# Patient Record
Sex: MALE | Race: WHITE | NOT HISPANIC OR LATINO | Employment: OTHER | URBAN - METROPOLITAN AREA
[De-identification: names, ages, dates, MRNs, and addresses within clinical notes are randomized per-mention and may not be internally consistent; named-entity substitution may affect disease eponyms.]

---

## 2017-07-24 ENCOUNTER — COMMUNICATION - HEALTHEAST (OUTPATIENT)
Dept: NEUROSURGERY | Facility: CLINIC | Age: 67
End: 2017-07-24

## 2017-08-02 ENCOUNTER — RECORDS - HEALTHEAST (OUTPATIENT)
Dept: ADMINISTRATIVE | Facility: OTHER | Age: 67
End: 2017-08-02

## 2017-10-26 ENCOUNTER — OFFICE VISIT - HEALTHEAST (OUTPATIENT)
Dept: NEUROSURGERY | Facility: CLINIC | Age: 67
End: 2017-10-26

## 2017-10-26 DIAGNOSIS — M79.671 FOOT PAIN, BILATERAL: ICD-10-CM

## 2017-10-26 DIAGNOSIS — M79.672 FOOT PAIN, BILATERAL: ICD-10-CM

## 2017-10-26 DIAGNOSIS — R20.0 NUMBNESS OF FOOT: ICD-10-CM

## 2017-10-26 ASSESSMENT — MIFFLIN-ST. JEOR: SCORE: 2237.26

## 2021-01-12 ENCOUNTER — COMMUNICATION - HEALTHEAST (OUTPATIENT)
Dept: SCHEDULING | Facility: CLINIC | Age: 71
End: 2021-01-12

## 2021-01-18 ENCOUNTER — AMBULATORY - HEALTHEAST (OUTPATIENT)
Dept: INTERVENTIONAL RADIOLOGY/VASCULAR | Facility: HOSPITAL | Age: 71
End: 2021-01-18

## 2021-01-19 ENCOUNTER — COMMUNICATION - HEALTHEAST (OUTPATIENT)
Dept: SCHEDULING | Facility: CLINIC | Age: 71
End: 2021-01-19

## 2021-01-25 ENCOUNTER — AMBULATORY - HEALTHEAST (OUTPATIENT)
Dept: GERIATRICS | Facility: CLINIC | Age: 71
End: 2021-01-25

## 2021-05-31 VITALS — HEIGHT: 74 IN | BODY MASS INDEX: 39.82 KG/M2 | WEIGHT: 310.3 LBS

## 2021-06-13 NOTE — PROGRESS NOTES
NEUROSURGERY CONSULTATION NOTE:    Assessment: Bilateral bottom of the foot numbness, resolved hand numbness on the left.    Plan: I have referred him for a neurological evaluation.  This may include an EMG.  He has had an extensive workup by Dr. Pruitt, with nothing found to date.  I cannot explain his foot numbness by his cervical MRI.    Mesfin Lee   [address]  [place] [address]  67 y.o. male is sent to me in consultation   by Josr Pruitt MD     CC:    Chief Complaint   Patient presents with     Numbness     bilateral feet       HPI:  Neurosurgery consultation was requested by: Dr. Josr Pruitt   Pain: Denies neck pain   Radicular Pain is present: Denies radicular pain   Lhermitte sign: No  Motor complaints: Weakness left hand   Sensory complaints: Numbness and pins and needles in both feet and left hand   Gait and balance issues: Denies   Bowel or bladder issues: Denies   Duration of SX is: 6 months  The symptoms are worse with: Constant   The symptoms are better with: Nothing   Injury: Denies   Severity is: Mild - moderate  Patient has tried the following conservative measures: None   NDI score is : 0%      PROBLEM LIST:  Numbness in the bottom of both feet    REVIEW OF SYSTEMS:  A 12 point review of systems is negative other than the symptoms listed above.      Past Medical History:   Diagnosis Date     Hepatitis C          Past Surgical History:   Procedure Laterality Date     WRIST SURGERY           MEDICATIONS:  No current outpatient prescriptions on file.     No current facility-administered medications for this visit.          ALLERGIES/SENSITIVITIES:     No Known Allergies    PERTINENT SOCIAL HISTORY:   Social History     Social History     Marital status:      Spouse name: N/A     Number of children: N/A     Years of education: N/A     Social History Main Topics     Smoking status: Former Smoker     Smokeless tobacco: None     Alcohol use Yes      Comment: 2/3 days      "Drug use: No     Sexual activity: Not Asked     Other Topics Concern     None     Social History Narrative         FAMILY HISTORY:  Family History   Problem Relation Age of Onset     No Medical Problems Mother      Dementia Father      No Medical Problems Sister      No Medical Problems Brother      No Medical Problems Daughter      No Medical Problems Son      No Medical Problems Maternal Aunt      No Medical Problems Maternal Uncle      No Medical Problems Paternal Aunt      No Medical Problems Paternal Uncle      No Medical Problems Maternal Grandmother      No Medical Problems Maternal Grandfather      No Medical Problems Paternal Grandmother      No Medical Problems Paternal Grandfather         PHYSICAL EXAM:   Constitutional: BP (!) 189/89  Pulse 82  Ht 6' 2\" (1.88 m)  Wt (!) 310 lb 4.8 oz (140.8 kg)  SpO2 96%  BMI 39.84 kg/m2    General appearance: Appropriately groomed.  No acute distress.  Interactive.     Mental Status: Mental status: Alert and oriented, mood and affect appropriate, language reception and expression normal, recent and remote memory is normal, higher cortical function normal. Attention span, concentration and ability to follow commands is normal.       Cranial Nerves: Face is symmetric.  Extraocular movements are full, conjugate and without nystagmus.  Hearing is preserved.  Shoulder position is symmetric.  Tongue is midline with normal motion.       Motor: Motor exam nl bilateral UE. Tone nl, bulk nl and strength 5/5 all groups.      Sensory: Sensory exam by subjective report intact to LT,PP,Position and Vib. in the UE and  LE.     Station and Gait:  Station and Gait- nl stride length,  balance and donavon..     Reflexes; supinator, biceps, triceps, knee/ ankle jerk intact. No hoffmans/babinski/ clonus.    IMAGING:  I have personally reviewed the images and discussed the findings with Mesfin Lee.  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  He " has some mild to moderate stenosis at C4-5 with only mild cord compression.  He has a moderate stenosis at C6-7.  There is no spinal cord pathology.  He has multilevel foraminal stenosis that is mild to moderate at C3-4 and moderate to marked at C6-7.    CC:     Josr Pruitt [id number] [place] [address]

## 2021-06-13 NOTE — PROGRESS NOTES
Neurosurgery consultation was requested by: Dr. Josr Pruitt   Pain: Denies neck pain   Radicular Pain is present: Denies radicular pain   Lhermitte sign: No  Motor complaints: Weakness left hand   Sensory complaints: Numbness and pins and needles in both feet and left hand   Gait and balance issues: Denies   Bowel or bladder issues: Denies   Duration of SX is: 6 months  The symptoms are worse with: Constant   The symptoms are better with: Nothing   Injury: Denies   Severity is: Mild - moderate  Patient has tried the following conservative measures: None   NDI score is : 0%  VIOLETTE Ventura

## 2021-06-16 PROBLEM — E87.20 METABOLIC ACIDOSIS: Status: ACTIVE | Noted: 2021-01-22

## 2021-06-16 PROBLEM — I10 ESSENTIAL HYPERTENSION, BENIGN: Chronic | Status: ACTIVE | Noted: 2021-01-21

## 2021-06-16 PROBLEM — K74.60 CIRRHOSIS OF LIVER WITH ASCITES (H): Status: ACTIVE | Noted: 2020-02-25

## 2021-06-16 PROBLEM — R18.8 CIRRHOSIS OF LIVER WITH ASCITES (H): Status: ACTIVE | Noted: 2020-02-25

## 2021-06-16 PROBLEM — J18.9 HEALTHCARE-ASSOCIATED PNEUMONIA: Chronic | Status: ACTIVE | Noted: 2021-01-11

## 2021-06-16 PROBLEM — I26.99 PE (PULMONARY THROMBOEMBOLISM) (H): Chronic | Status: ACTIVE | Noted: 2021-01-22

## 2021-06-16 PROBLEM — B18.2 HEP C W/O COMA, CHRONIC (H): Status: ACTIVE | Noted: 2017-12-12

## 2021-06-16 PROBLEM — K21.9 GASTROESOPHAGEAL REFLUX DISEASE WITHOUT ESOPHAGITIS: Chronic | Status: ACTIVE | Noted: 2021-01-21

## 2021-06-16 PROBLEM — Z79.01 CHRONIC ANTICOAGULATION: Chronic | Status: ACTIVE | Noted: 2021-01-29

## 2021-07-14 PROBLEM — N17.9 ACUTE KIDNEY INJURY (H): Chronic | Status: RESOLVED | Noted: 2021-01-22 | Resolved: 2021-01-29

## 2021-07-14 PROBLEM — N17.9 ACUTE KIDNEY FAILURE, UNSPECIFIED (H): Status: RESOLVED | Noted: 2021-01-22 | Resolved: 2021-01-29

## 2021-12-31 ENCOUNTER — HOSPITAL ENCOUNTER (INPATIENT)
Facility: CLINIC | Age: 71
LOS: 18 days | Discharge: NURSING, REHAB OR LONG-TERM CARE | DRG: 896 | End: 2022-01-19
Attending: EMERGENCY MEDICINE | Admitting: HOSPITALIST
Payer: COMMERCIAL

## 2021-12-31 ENCOUNTER — APPOINTMENT (OUTPATIENT)
Dept: CT IMAGING | Facility: CLINIC | Age: 71
DRG: 896 | End: 2021-12-31
Attending: EMERGENCY MEDICINE
Payer: COMMERCIAL

## 2021-12-31 DIAGNOSIS — R09.02 HYPOXIA: ICD-10-CM

## 2021-12-31 DIAGNOSIS — E87.1 HYPONATREMIA: ICD-10-CM

## 2021-12-31 DIAGNOSIS — U07.1 INFECTION DUE TO 2019 NOVEL CORONAVIRUS: ICD-10-CM

## 2021-12-31 DIAGNOSIS — E87.6 HYPOKALEMIA: ICD-10-CM

## 2021-12-31 DIAGNOSIS — F10.920 ALCOHOLIC INTOXICATION WITHOUT COMPLICATION (H): ICD-10-CM

## 2021-12-31 DIAGNOSIS — I10 ESSENTIAL HYPERTENSION, BENIGN: Chronic | ICD-10-CM

## 2021-12-31 DIAGNOSIS — R25.1 TREMOR: ICD-10-CM

## 2021-12-31 DIAGNOSIS — I26.99 PE (PULMONARY THROMBOEMBOLISM) (H): Primary | Chronic | ICD-10-CM

## 2021-12-31 LAB
ALBUMIN SERPL-MCNC: 3.6 G/DL (ref 3.5–5)
ALP SERPL-CCNC: 57 U/L (ref 45–120)
ALT SERPL W P-5'-P-CCNC: 26 U/L (ref 0–45)
AMMONIA PLAS-SCNC: 37 UMOL/L (ref 11–35)
ANION GAP SERPL CALCULATED.3IONS-SCNC: 13 MMOL/L (ref 5–18)
AST SERPL W P-5'-P-CCNC: 33 U/L (ref 0–40)
BASOPHILS # BLD AUTO: 0 10E3/UL (ref 0–0.2)
BASOPHILS NFR BLD AUTO: 0 %
BILIRUB SERPL-MCNC: 0.6 MG/DL (ref 0–1)
BUN SERPL-MCNC: 12 MG/DL (ref 8–28)
CALCIUM SERPL-MCNC: 8.3 MG/DL (ref 8.5–10.5)
CHLORIDE BLD-SCNC: 94 MMOL/L (ref 98–107)
CO2 SERPL-SCNC: 21 MMOL/L (ref 22–31)
CREAT SERPL-MCNC: 1.12 MG/DL (ref 0.7–1.3)
EOSINOPHIL # BLD AUTO: 0 10E3/UL (ref 0–0.7)
EOSINOPHIL NFR BLD AUTO: 0 %
ERYTHROCYTE [DISTWIDTH] IN BLOOD BY AUTOMATED COUNT: 13.4 % (ref 10–15)
ETHANOL SERPL-MCNC: 166 MG/DL
GFR SERPL CREATININE-BSD FRML MDRD: 71 ML/MIN/1.73M2
GLUCOSE BLD-MCNC: 100 MG/DL (ref 70–125)
HCT VFR BLD AUTO: 42.9 % (ref 40–53)
HGB BLD-MCNC: 14.9 G/DL (ref 13.3–17.7)
IMM GRANULOCYTES # BLD: 0 10E3/UL
IMM GRANULOCYTES NFR BLD: 0 %
LIPASE SERPL-CCNC: 16 U/L (ref 0–52)
LYMPHOCYTES # BLD AUTO: 3.3 10E3/UL (ref 0.8–5.3)
LYMPHOCYTES NFR BLD AUTO: 40 %
MCH RBC QN AUTO: 33.9 PG (ref 26.5–33)
MCHC RBC AUTO-ENTMCNC: 34.7 G/DL (ref 31.5–36.5)
MCV RBC AUTO: 98 FL (ref 78–100)
MONOCYTES # BLD AUTO: 1 10E3/UL (ref 0–1.3)
MONOCYTES NFR BLD AUTO: 12 %
NEUTROPHILS # BLD AUTO: 4.1 10E3/UL (ref 1.6–8.3)
NEUTROPHILS NFR BLD AUTO: 48 %
NRBC # BLD AUTO: 0 10E3/UL
NRBC BLD AUTO-RTO: 0 /100
PLATELET # BLD AUTO: 138 10E3/UL (ref 150–450)
POTASSIUM BLD-SCNC: 3.1 MMOL/L (ref 3.5–5)
PROT SERPL-MCNC: 7.5 G/DL (ref 6–8)
RBC # BLD AUTO: 4.39 10E6/UL (ref 4.4–5.9)
SODIUM SERPL-SCNC: 128 MMOL/L (ref 136–145)
WBC # BLD AUTO: 8.4 10E3/UL (ref 4–11)

## 2021-12-31 PROCEDURE — 83690 ASSAY OF LIPASE: CPT | Performed by: EMERGENCY MEDICINE

## 2021-12-31 PROCEDURE — 258N000003 HC RX IP 258 OP 636: Performed by: EMERGENCY MEDICINE

## 2021-12-31 PROCEDURE — 70450 CT HEAD/BRAIN W/O DYE: CPT

## 2021-12-31 PROCEDURE — 36415 COLL VENOUS BLD VENIPUNCTURE: CPT | Performed by: EMERGENCY MEDICINE

## 2021-12-31 PROCEDURE — 82077 ASSAY SPEC XCP UR&BREATH IA: CPT | Performed by: EMERGENCY MEDICINE

## 2021-12-31 PROCEDURE — 96360 HYDRATION IV INFUSION INIT: CPT

## 2021-12-31 PROCEDURE — 96361 HYDRATE IV INFUSION ADD-ON: CPT

## 2021-12-31 PROCEDURE — 82140 ASSAY OF AMMONIA: CPT | Performed by: EMERGENCY MEDICINE

## 2021-12-31 PROCEDURE — 93005 ELECTROCARDIOGRAM TRACING: CPT | Performed by: EMERGENCY MEDICINE

## 2021-12-31 PROCEDURE — 80053 COMPREHEN METABOLIC PANEL: CPT | Performed by: EMERGENCY MEDICINE

## 2021-12-31 PROCEDURE — 83735 ASSAY OF MAGNESIUM: CPT | Performed by: HOSPITALIST

## 2021-12-31 PROCEDURE — 85025 COMPLETE CBC W/AUTO DIFF WBC: CPT | Performed by: EMERGENCY MEDICINE

## 2021-12-31 PROCEDURE — 99285 EMERGENCY DEPT VISIT HI MDM: CPT | Mod: 25

## 2021-12-31 RX ORDER — SODIUM CHLORIDE 9 MG/ML
INJECTION, SOLUTION INTRAVENOUS CONTINUOUS
Status: DISCONTINUED | OUTPATIENT
Start: 2021-12-31 | End: 2022-01-01

## 2021-12-31 RX ADMIN — SODIUM CHLORIDE 1000 ML: 9 INJECTION, SOLUTION INTRAVENOUS at 22:14

## 2021-12-31 RX ADMIN — SODIUM CHLORIDE: 9 INJECTION, SOLUTION INTRAVENOUS at 23:08

## 2021-12-31 ASSESSMENT — ENCOUNTER SYMPTOMS: ABDOMINAL PAIN: 0

## 2022-01-01 ENCOUNTER — APPOINTMENT (OUTPATIENT)
Dept: RADIOLOGY | Facility: CLINIC | Age: 72
DRG: 896 | End: 2022-01-01
Attending: EMERGENCY MEDICINE
Payer: COMMERCIAL

## 2022-01-01 PROBLEM — R09.02 HYPOXIA: Status: ACTIVE | Noted: 2022-01-01

## 2022-01-01 PROBLEM — E87.1 HYPONATREMIA: Status: ACTIVE | Noted: 2021-01-22

## 2022-01-01 PROBLEM — U07.1 INFECTION DUE TO 2019 NOVEL CORONAVIRUS: Status: ACTIVE | Noted: 2022-01-01

## 2022-01-01 PROBLEM — E87.6 HYPOKALEMIA: Status: ACTIVE | Noted: 2022-01-01

## 2022-01-01 PROBLEM — F10.920 ALCOHOLIC INTOXICATION WITHOUT COMPLICATION (H): Status: ACTIVE | Noted: 2022-01-01

## 2022-01-01 LAB
ABO/RH(D): NORMAL
ALBUMIN SERPL-MCNC: 3.4 G/DL (ref 3.5–5)
ALP SERPL-CCNC: 52 U/L (ref 45–120)
ALT SERPL W P-5'-P-CCNC: 29 U/L (ref 0–45)
ANION GAP SERPL CALCULATED.3IONS-SCNC: 10 MMOL/L (ref 5–18)
APTT PPP: 32 SECONDS (ref 22–38)
AST SERPL W P-5'-P-CCNC: 37 U/L (ref 0–40)
ATRIAL RATE - MUSE: 94 BPM
BASOPHILS # BLD AUTO: 0 10E3/UL (ref 0–0.2)
BASOPHILS NFR BLD AUTO: 0 %
BILIRUB SERPL-MCNC: 0.6 MG/DL (ref 0–1)
BUN SERPL-MCNC: 9 MG/DL (ref 8–28)
C REACTIVE PROTEIN LHE: 6 MG/DL (ref 0–0.8)
CALCIUM SERPL-MCNC: 8 MG/DL (ref 8.5–10.5)
CHLORIDE BLD-SCNC: 96 MMOL/L (ref 98–107)
CO2 SERPL-SCNC: 25 MMOL/L (ref 22–31)
CREAT SERPL-MCNC: 0.94 MG/DL (ref 0.7–1.3)
D DIMER PPP FEU-MCNC: 0.41 UG/ML FEU (ref 0–0.5)
DIASTOLIC BLOOD PRESSURE - MUSE: 74 MMHG
EOSINOPHIL # BLD AUTO: 0 10E3/UL (ref 0–0.7)
EOSINOPHIL NFR BLD AUTO: 0 %
ERYTHROCYTE [DISTWIDTH] IN BLOOD BY AUTOMATED COUNT: 13.7 % (ref 10–15)
FIBRINOGEN PPP-MCNC: 417 MG/DL (ref 170–490)
GFR SERPL CREATININE-BSD FRML MDRD: 87 ML/MIN/1.73M2
GLUCOSE BLD-MCNC: 97 MG/DL (ref 70–125)
HCT VFR BLD AUTO: 42.4 % (ref 40–53)
HGB BLD-MCNC: 14.5 G/DL (ref 13.3–17.7)
IMM GRANULOCYTES # BLD: 0 10E3/UL
IMM GRANULOCYTES NFR BLD: 0 %
INR PPP: 1.22 (ref 0.85–1.15)
INTERPRETATION ECG - MUSE: NORMAL
LDH SERPL L TO P-CCNC: 140 U/L (ref 125–220)
LYMPHOCYTES # BLD AUTO: 2.2 10E3/UL (ref 0.8–5.3)
LYMPHOCYTES NFR BLD AUTO: 34 %
MAGNESIUM SERPL-MCNC: 1.8 MG/DL (ref 1.8–2.6)
MCH RBC QN AUTO: 33.8 PG (ref 26.5–33)
MCHC RBC AUTO-ENTMCNC: 34.2 G/DL (ref 31.5–36.5)
MCV RBC AUTO: 99 FL (ref 78–100)
MONOCYTES # BLD AUTO: 0.8 10E3/UL (ref 0–1.3)
MONOCYTES NFR BLD AUTO: 12 %
NEUTROPHILS # BLD AUTO: 3.4 10E3/UL (ref 1.6–8.3)
NEUTROPHILS NFR BLD AUTO: 54 %
NRBC # BLD AUTO: 0 10E3/UL
NRBC BLD AUTO-RTO: 0 /100
P AXIS - MUSE: 71 DEGREES
PHOSPHATE SERPL-MCNC: 3.1 MG/DL (ref 2.5–4.5)
PLATELET # BLD AUTO: 121 10E3/UL (ref 150–450)
POTASSIUM BLD-SCNC: 3.3 MMOL/L (ref 3.5–5)
POTASSIUM BLD-SCNC: 3.3 MMOL/L (ref 3.5–5)
POTASSIUM BLD-SCNC: 3.8 MMOL/L (ref 3.5–5)
PR INTERVAL - MUSE: 186 MS
PROT SERPL-MCNC: 7.2 G/DL (ref 6–8)
QRS DURATION - MUSE: 168 MS
QT - MUSE: 434 MS
QTC - MUSE: 542 MS
R AXIS - MUSE: -4 DEGREES
RBC # BLD AUTO: 4.29 10E6/UL (ref 4.4–5.9)
SARS-COV-2 RNA RESP QL NAA+PROBE: POSITIVE
SODIUM SERPL-SCNC: 131 MMOL/L (ref 136–145)
SPECIMEN EXPIRATION DATE: NORMAL
SYSTOLIC BLOOD PRESSURE - MUSE: 131 MMHG
T AXIS - MUSE: 40 DEGREES
TROPONIN I SERPL-MCNC: 0.04 NG/ML (ref 0–0.29)
VENTRICULAR RATE- MUSE: 94 BPM
WBC # BLD AUTO: 6.4 10E3/UL (ref 4–11)

## 2022-01-01 PROCEDURE — 250N000009 HC RX 250: Performed by: HOSPITALIST

## 2022-01-01 PROCEDURE — 250N000011 HC RX IP 250 OP 636: Performed by: HOSPITALIST

## 2022-01-01 PROCEDURE — 83615 LACTATE (LD) (LDH) ENZYME: CPT | Performed by: HOSPITALIST

## 2022-01-01 PROCEDURE — 86901 BLOOD TYPING SEROLOGIC RH(D): CPT | Performed by: HOSPITALIST

## 2022-01-01 PROCEDURE — 99207 PR NO BILLABLE SERVICE THIS VISIT: CPT | Performed by: INTERNAL MEDICINE

## 2022-01-01 PROCEDURE — 94640 AIRWAY INHALATION TREATMENT: CPT

## 2022-01-01 PROCEDURE — 85730 THROMBOPLASTIN TIME PARTIAL: CPT | Performed by: HOSPITALIST

## 2022-01-01 PROCEDURE — 80053 COMPREHEN METABOLIC PANEL: CPT | Performed by: HOSPITALIST

## 2022-01-01 PROCEDURE — 85384 FIBRINOGEN ACTIVITY: CPT | Performed by: HOSPITALIST

## 2022-01-01 PROCEDURE — 87635 SARS-COV-2 COVID-19 AMP PRB: CPT | Performed by: EMERGENCY MEDICINE

## 2022-01-01 PROCEDURE — 85379 FIBRIN DEGRADATION QUANT: CPT | Performed by: HOSPITALIST

## 2022-01-01 PROCEDURE — 84100 ASSAY OF PHOSPHORUS: CPT | Performed by: HOSPITALIST

## 2022-01-01 PROCEDURE — 71046 X-RAY EXAM CHEST 2 VIEWS: CPT

## 2022-01-01 PROCEDURE — 120N000001 HC R&B MED SURG/OB

## 2022-01-01 PROCEDURE — 250N000013 HC RX MED GY IP 250 OP 250 PS 637: Performed by: EMERGENCY MEDICINE

## 2022-01-01 PROCEDURE — 84132 ASSAY OF SERUM POTASSIUM: CPT | Performed by: HOSPITALIST

## 2022-01-01 PROCEDURE — 258N000003 HC RX IP 258 OP 636: Performed by: HOSPITALIST

## 2022-01-01 PROCEDURE — 85610 PROTHROMBIN TIME: CPT | Performed by: HOSPITALIST

## 2022-01-01 PROCEDURE — 86141 C-REACTIVE PROTEIN HS: CPT | Performed by: HOSPITALIST

## 2022-01-01 PROCEDURE — 250N000009 HC RX 250: Performed by: EMERGENCY MEDICINE

## 2022-01-01 PROCEDURE — 99207 PR NO CHARGE LOS: CPT | Performed by: FAMILY MEDICINE

## 2022-01-01 PROCEDURE — 250N000013 HC RX MED GY IP 250 OP 250 PS 637: Performed by: INTERNAL MEDICINE

## 2022-01-01 PROCEDURE — 84484 ASSAY OF TROPONIN QUANT: CPT | Performed by: HOSPITALIST

## 2022-01-01 PROCEDURE — 99223 1ST HOSP IP/OBS HIGH 75: CPT | Performed by: HOSPITALIST

## 2022-01-01 PROCEDURE — 36415 COLL VENOUS BLD VENIPUNCTURE: CPT | Performed by: HOSPITALIST

## 2022-01-01 PROCEDURE — 85025 COMPLETE CBC W/AUTO DIFF WBC: CPT | Performed by: HOSPITALIST

## 2022-01-01 PROCEDURE — 250N000013 HC RX MED GY IP 250 OP 250 PS 637: Performed by: HOSPITALIST

## 2022-01-01 PROCEDURE — C9803 HOPD COVID-19 SPEC COLLECT: HCPCS

## 2022-01-01 RX ORDER — LORAZEPAM 2 MG/ML
1-2 INJECTION INTRAMUSCULAR EVERY 30 MIN PRN
Status: DISCONTINUED | OUTPATIENT
Start: 2022-01-01 | End: 2022-01-03

## 2022-01-01 RX ORDER — POTASSIUM CHLORIDE 1500 MG/1
40 TABLET, EXTENDED RELEASE ORAL ONCE
Status: COMPLETED | OUTPATIENT
Start: 2022-01-01 | End: 2022-01-01

## 2022-01-01 RX ORDER — FOLIC ACID 1 MG/1
1 TABLET ORAL DAILY
Status: DISCONTINUED | OUTPATIENT
Start: 2022-01-02 | End: 2022-01-19 | Stop reason: HOSPADM

## 2022-01-01 RX ORDER — DIAZEPAM 5 MG
5 TABLET ORAL 3 TIMES DAILY
Status: DISCONTINUED | OUTPATIENT
Start: 2022-01-01 | End: 2022-01-09

## 2022-01-01 RX ORDER — ACETAMINOPHEN 650 MG/1
650 SUPPOSITORY RECTAL EVERY 6 HOURS PRN
Status: DISCONTINUED | OUTPATIENT
Start: 2022-01-01 | End: 2022-01-19 | Stop reason: HOSPADM

## 2022-01-01 RX ORDER — GABAPENTIN 100 MG/1
200 CAPSULE ORAL 3 TIMES DAILY
Status: DISCONTINUED | OUTPATIENT
Start: 2022-01-01 | End: 2022-01-07

## 2022-01-01 RX ORDER — PROPRANOLOL HCL 60 MG
60 CAPSULE, EXTENDED RELEASE 24HR ORAL DAILY
Status: ON HOLD | COMMUNITY
Start: 2021-07-31 | End: 2022-01-01

## 2022-01-01 RX ORDER — METOPROLOL TARTRATE 50 MG
50 TABLET ORAL 2 TIMES DAILY
Status: DISCONTINUED | OUTPATIENT
Start: 2022-01-01 | End: 2022-01-02

## 2022-01-01 RX ORDER — ONDANSETRON 2 MG/ML
4 INJECTION INTRAMUSCULAR; INTRAVENOUS EVERY 6 HOURS PRN
Status: DISCONTINUED | OUTPATIENT
Start: 2022-01-01 | End: 2022-01-19 | Stop reason: HOSPADM

## 2022-01-01 RX ORDER — CLONIDINE HYDROCHLORIDE 0.1 MG/1
0.1 TABLET ORAL EVERY 8 HOURS
Status: DISCONTINUED | OUTPATIENT
Start: 2022-01-01 | End: 2022-01-04

## 2022-01-01 RX ORDER — LIDOCAINE 40 MG/G
CREAM TOPICAL
Status: DISCONTINUED | OUTPATIENT
Start: 2022-01-01 | End: 2022-01-19 | Stop reason: HOSPADM

## 2022-01-01 RX ORDER — DEXAMETHASONE SODIUM PHOSPHATE 10 MG/ML
6 INJECTION, SOLUTION INTRAMUSCULAR; INTRAVENOUS DAILY
Status: COMPLETED | OUTPATIENT
Start: 2022-01-01 | End: 2022-01-10

## 2022-01-01 RX ORDER — LISINOPRIL 10 MG/1
10 TABLET ORAL DAILY
Status: DISCONTINUED | OUTPATIENT
Start: 2022-01-01 | End: 2022-01-01

## 2022-01-01 RX ORDER — GABAPENTIN 100 MG/1
200 CAPSULE ORAL 2 TIMES DAILY
Status: DISCONTINUED | OUTPATIENT
Start: 2022-01-01 | End: 2022-01-01

## 2022-01-01 RX ORDER — LORAZEPAM 1 MG/1
1-2 TABLET ORAL EVERY 30 MIN PRN
Status: DISCONTINUED | OUTPATIENT
Start: 2022-01-01 | End: 2022-01-03

## 2022-01-01 RX ORDER — MULTIPLE VITAMINS W/ MINERALS TAB 9MG-400MCG
1 TAB ORAL DAILY
Status: DISCONTINUED | OUTPATIENT
Start: 2022-01-02 | End: 2022-01-03

## 2022-01-01 RX ORDER — ONDANSETRON 4 MG/1
4 TABLET, ORALLY DISINTEGRATING ORAL EVERY 6 HOURS PRN
Status: DISCONTINUED | OUTPATIENT
Start: 2022-01-01 | End: 2022-01-19 | Stop reason: HOSPADM

## 2022-01-01 RX ORDER — DIAZEPAM 5 MG
5 TABLET ORAL ONCE
Status: COMPLETED | OUTPATIENT
Start: 2022-01-01 | End: 2022-01-01

## 2022-01-01 RX ORDER — FLUMAZENIL 0.1 MG/ML
0.2 INJECTION, SOLUTION INTRAVENOUS
Status: DISCONTINUED | OUTPATIENT
Start: 2022-01-01 | End: 2022-01-03

## 2022-01-01 RX ORDER — ACETAMINOPHEN 325 MG/1
650 TABLET ORAL EVERY 6 HOURS PRN
Status: DISCONTINUED | OUTPATIENT
Start: 2022-01-01 | End: 2022-01-19 | Stop reason: HOSPADM

## 2022-01-01 RX ORDER — IPRATROPIUM BROMIDE AND ALBUTEROL SULFATE 2.5; .5 MG/3ML; MG/3ML
3 SOLUTION RESPIRATORY (INHALATION) ONCE
Status: COMPLETED | OUTPATIENT
Start: 2022-01-01 | End: 2022-01-01

## 2022-01-01 RX ORDER — POLYETHYLENE GLYCOL 3350 17 G/17G
17 POWDER, FOR SOLUTION ORAL DAILY
Status: DISCONTINUED | OUTPATIENT
Start: 2022-01-01 | End: 2022-01-07

## 2022-01-01 RX ADMIN — CLONIDINE HYDROCHLORIDE 0.1 MG: 0.1 TABLET ORAL at 04:31

## 2022-01-01 RX ADMIN — POLYETHYLENE GLYCOL 3350 17 G: 17 POWDER, FOR SOLUTION ORAL at 11:11

## 2022-01-01 RX ADMIN — IPRATROPIUM BROMIDE AND ALBUTEROL SULFATE 3 ML: .5; 2.5 SOLUTION RESPIRATORY (INHALATION) at 00:10

## 2022-01-01 RX ADMIN — DEXAMETHASONE SODIUM PHOSPHATE 6 MG: 10 INJECTION, SOLUTION INTRAMUSCULAR; INTRAVENOUS at 04:31

## 2022-01-01 RX ADMIN — LORAZEPAM 1 MG: 1 TABLET ORAL at 18:55

## 2022-01-01 RX ADMIN — ACETAMINOPHEN 650 MG: 325 TABLET ORAL at 16:04

## 2022-01-01 RX ADMIN — DIAZEPAM 5 MG: 5 TABLET ORAL at 13:46

## 2022-01-01 RX ADMIN — CLONIDINE HYDROCHLORIDE 0.1 MG: 0.1 TABLET ORAL at 19:00

## 2022-01-01 RX ADMIN — Medication 5 MG: at 04:43

## 2022-01-01 RX ADMIN — GABAPENTIN 200 MG: 100 CAPSULE ORAL at 20:01

## 2022-01-01 RX ADMIN — DIAZEPAM 5 MG: 5 TABLET ORAL at 11:11

## 2022-01-01 RX ADMIN — CLONIDINE HYDROCHLORIDE 0.1 MG: 0.1 TABLET ORAL at 11:11

## 2022-01-01 RX ADMIN — DIAZEPAM 5 MG: 5 TABLET ORAL at 20:01

## 2022-01-01 RX ADMIN — DIAZEPAM 5 MG: 5 TABLET ORAL at 02:39

## 2022-01-01 RX ADMIN — THIAMINE HYDROCHLORIDE: 100 INJECTION, SOLUTION INTRAMUSCULAR; INTRAVENOUS at 04:31

## 2022-01-01 RX ADMIN — POTASSIUM CHLORIDE 40 MEQ: 1500 TABLET, EXTENDED RELEASE ORAL at 04:44

## 2022-01-01 RX ADMIN — METOPROLOL TARTRATE 50 MG: 50 TABLET, FILM COATED ORAL at 11:11

## 2022-01-01 RX ADMIN — GABAPENTIN 200 MG: 100 CAPSULE ORAL at 13:46

## 2022-01-01 ASSESSMENT — ACTIVITIES OF DAILY LIVING (ADL)
ADLS_ACUITY_SCORE: 14
HEARING_DIFFICULTY_OR_DEAF: NO
ADLS_ACUITY_SCORE: 16
DIFFICULTY_EATING/SWALLOWING: NO
ADLS_ACUITY_SCORE: 14
ADLS_ACUITY_SCORE: 16
ADLS_ACUITY_SCORE: 14
ADLS_ACUITY_SCORE: 14
ADLS_ACUITY_SCORE: 16
ADLS_ACUITY_SCORE: 10
DRESSING/BATHING_DIFFICULTY: NO
DOING_ERRANDS_INDEPENDENTLY_DIFFICULTY: NO
ADLS_ACUITY_SCORE: 14
ADLS_ACUITY_SCORE: 14
WEAR_GLASSES_OR_BLIND: NO
ADLS_ACUITY_SCORE: 16
WALKING_OR_CLIMBING_STAIRS_DIFFICULTY: NO
ADLS_ACUITY_SCORE: 6
ADLS_ACUITY_SCORE: 14
ADLS_ACUITY_SCORE: 14
FALL_HISTORY_WITHIN_LAST_SIX_MONTHS: NO
TOILETING_ISSUES: OTHER (SEE COMMENTS)
CONCENTRATING,_REMEMBERING_OR_MAKING_DECISIONS_DIFFICULTY: NO
ADLS_ACUITY_SCORE: 16
ADLS_ACUITY_SCORE: 7
DIFFICULTY_COMMUNICATING: NO
ADLS_ACUITY_SCORE: 6

## 2022-01-01 NOTE — PROGRESS NOTES
Care Management Follow Up    Length of Stay (days): 0    Expected Discharge Date: 01/03/2022     Concerns to be Addressed:       Patient plan of care discussed at interdisciplinary rounds: No    Anticipated Discharge Disposition: Home       Anticipated Discharge Services:    Anticipated Discharge DME:      Patient/family educated on Medicare website which has current facility and service quality ratings:    Education Provided on the Discharge Plan:    Patient/Family in Agreement with the Plan:      Referrals Placed by CM/SW:    Private pay costs discussed:     Additional Information:  Chart review.  Called patient in room COVID+. Patient lives alone in a 2 level house.  He still drives.  He drinks 3-4 times a week.  Having 4-5 drinks at a restaurant. He stated he has never been to treatment.  He had a DWI last July in [place].  He does think he needs to stop drinking. He would be agreeable to CD resources closer to discharge.  CM to continue to follow.      CARLINE Flores

## 2022-01-01 NOTE — ED NOTES
Pt stood to use urinal with minimal assistance. Pt then stated that he felt dizzy and shaky. Pt laid back down. MD updated.

## 2022-01-01 NOTE — ED NOTES
Bed: [facility]-12  Expected date: 12/31/21  Expected time: 8:11 PM  Means of arrival: Ambulance  Comments:  [place]

## 2022-01-01 NOTE — ED PROVIDER NOTES
"EMERGENCY DEPARTMENT ENCOUNTER      NAME: Mesfin Lee  AGE: 71 year old male  YOB: 1950  MRN: [id number]  EVALUATION DATE & TIME: 2021  8:16 PM    PCP: Josr Pruitt    ED PROVIDER: Dashawn Horton M.D.      No chief complaint on file.    Cannot stand or walk.    FINAL IMPRESSION:  1.  Acute alcohol intoxication.  2.  Chronic alcoholism.      ED COURSE & MEDICAL DECISION MAKIN:18 PM I met with the patient to gather history and to perform my initial exam. We discussed plans for the ED course, including diagnostic testing and treatment. PPE worn: cloth mask.  [facility]  After drinking there he was not able to stand or walk on his own.  He noted no difficulty walking into the place beforehand.  Patient states \"I drank too much.  \"Chart indicates history of alcoholism, hepatitis C which is chronic, cirrhosis with ascites and hepatic coma in the past.  9:35 PM.  CBC unremarkable and platelets at 138.  Lipase, LFTs, chemistry and blood alcohol levels pending.  Head CT also pending.  Tentatively, patient will be signed out to the night ED physician at around 10 PM.  Tentative discharge if patient has a ride.    Pertinent Labs & Imaging studies reviewed. (See chart for details)  71 year old male presents to the Emergency Department for evaluation of ambulation issues.     At the conclusion of the encounter I discussed the results of all of the tests and the disposition. The questions were answered. The patient or family acknowledged understanding and was agreeable with the care plan.        MEDICATIONS GIVEN IN THE EMERGENCY:  Medications - No data to display    NEW PRESCRIPTIONS STARTED AT TODAY'S ER VISIT  New Prescriptions    No medications on file          =================================================================    HPI    Patient information was obtained from: Patient     Use of : N/A      Mesfin Lee is a 71 year old male with " "a pertinent history of Hepatitis C, GERD, HTN, RBBB, PE, and SBO who presents to this ED via EMS for evaluation of ambulatory issues.     The patient reports having \"a few too many\" drinks at the bar tonight. He states that he had 4-5 drinks and suddenly was unable to walk. The patient states that he is \"not really\" an alcoholic and wants to go home. He is otherwise in his usual state of health and denies any abdominal pain or any other concerns at this time.     He does not identify any waxing or waning symptoms otherwise, exacerbating or alleviating features,associated symptoms except as mentioned. He denies any pain related complaints.    REVIEW OF SYSTEMS   Review of Systems   Constitutional:        Positive for being unable to walk.    Gastrointestinal: Negative for abdominal pain.   All other systems reviewed and are negative.       PAST MEDICAL HISTORY:  Past Medical History:   Diagnosis Date     Hepatitis C        PAST SURGICAL HISTORY:  Past Surgical History:   Procedure Laterality Date     US GUIDED NEEDLE PLACEMENT  1/18/2021     WRIST SURGERY             CURRENT MEDICATIONS:    acetaminophen (TYLENOL) 325 MG tablet  apixaban ANTICOAGULANT (ELIQUIS) 5 mg Tab tablet  baclofen (LIORESAL) 10 MG tablet  bisacodyL (DULCOLAX) 10 mg suppository  diclofenac sodium (VOLTAREN) 1 % Gel  escitalopram oxalate (LEXAPRO) 10 MG tablet  gabapentin (NEURONTIN) 100 MG capsule  lidocaine 4 % patch  lisinopriL (PRINIVIL,ZESTRIL) 10 MG tablet  melatonin 5 mg Tab tablet  omeprazole (PRILOSEC) 40 MG capsule  oxyCODONE (ROXICODONE) 15 MG immediate release tablet  polyethylene glycol (MIRALAX) 17 gram packet  polyethylene glycol (MIRALAX) 17 gram packet  saliva stimulant (BIOTENE) oral spray  senna-docusate (PERICOLACE) 8.6-50 mg tablet  traZODone (DESYREL) 100 MG tablet        ALLERGIES:  No Known Allergies    FAMILY HISTORY:  Family History   Problem Relation Age of Onset     No Known Problems Mother      Dementia Father      No " Known Problems Sister      No Known Problems Brother      No Known Problems Daughter      No Known Problems Son      No Known Problems Maternal Aunt      No Known Problems Maternal Uncle      No Known Problems Paternal Aunt      No Known Problems Paternal Uncle      No Known Problems Maternal Grandmother      No Known Problems Maternal Grandfather      No Known Problems Paternal Grandmother      No Known Problems Paternal Grandfather        SOCIAL HISTORY:   Social History     Socioeconomic History     Marital status:      Spouse name: Not on file     Number of children: Not on file     Years of education: Not on file     Highest education level: Not on file   Occupational History     Not on file   Tobacco Use     Smoking status: Former Smoker     Smokeless tobacco: Not on file   Substance and Sexual Activity     Alcohol use: Yes     Comment: Alcoholic Drinks/day: 2/3 days     Drug use: No     Sexual activity: Not on file   Other Topics Concern     Not on file   Social History Narrative     Not on file     Social Determinants of Health     Financial Resource Strain: Not on file   Food Insecurity: Not on file   Transportation Needs: Not on file   Physical Activity: Not on file   Stress: Not on file   Social Connections: Not on file   Intimate Partner Violence: Not on file   Housing Stability: Not on file   Chart indicates former smoker.  No current drugs or tobacco.  Patient is a practicing alcoholic.    VITALS:  /74   Pulse 94   Temp 98.9  F (37.2  C) (Oral)   Resp 22   SpO2 93%     PHYSICAL EXAM    Vital Signs:  /74   Pulse 94   Temp 98.9  F (37.2  C) (Oral)   Resp 22   SpO2 93%   General:  On entering the room is in no apparent distress.  Sounds intoxicated.  However, answers questions appropriately.  Neck:  Neck supple with full range of motion and nontender.    Back:  Back and spine are nontender.  No costovertebral angle tenderness.    HEENT:  Oropharynx clear with moist mucous  membranes.  HEENT unremarkable.    Pulmonary:  Chest clear to auscultation without rhonchi rales or wheezing.    Cardiovascular:  Cardiac regular rate and rhythm without murmurs rubs or gallops.    Abdomen:  Abdomen soft nontender.  There is no rebound or guarding.    Muskuloskeletal:  he moves all 4 without any difficulty and has normal neurovascular exams.  Extremities without clubbing, cyanosis, or edema.  Legs and calves are nontender.    Neuro:  he is alert and oriented ×3 and moves all extremities symmetrically.    Psych:  Normal affect.    Skin:  Unremarkable and warm and dry.       LAB:  All pertinent labs reviewed and interpreted.  Labs Ordered and Resulted from Time of ED Arrival to Time of ED Departure   AMMONIA - Abnormal       Result Value    Ammonia 37 (*)    CBC WITH PLATELETS AND DIFFERENTIAL - Abnormal    WBC Count 8.4      RBC Count 4.39 (*)     Hemoglobin 14.9      Hematocrit 42.9      MCV 98      MCH 33.9 (*)     MCHC 34.7      RDW 13.4      Platelet Count 138 (*)     % Neutrophils 48      % Lymphocytes 40      % Monocytes 12      % Eosinophils 0      % Basophils 0      % Immature Granulocytes 0      NRBCs per 100 WBC 0      Absolute Neutrophils 4.1      Absolute Lymphocytes 3.3      Absolute Monocytes 1.0      Absolute Eosinophils 0.0      Absolute Basophils 0.0      Absolute Immature Granulocytes 0.0      Absolute NRBCs 0.0     ETHYL ALCOHOL LEVEL   COMPREHENSIVE METABOLIC PANEL   LIPASE       RADIOLOGY:  Reviewed all pertinent imaging. Please see official radiology report.  Head CT w/o contrast    (Results Pending)              EKG:    Normal sinus rhythm at 94, right bundle branch block.  Similar to previous EKG of January 26, 2021 but the QT corrected phase is longer than 0.542 from previous 0.475.    I have independently reviewed and interpreted the EKG(s) documented above.      [place], am serving as a scribe to document services personally performed by Dr. Horton based on my  observation and the provider's statements to me. I, Dashawn Horton MD attest that Lamontjannet Villarenan is acting in a scribe capacity, has observed my performance of the services and has documented them in accordance with my direction.    Dashawn Horton M.D.  Emergency Medicine  [facility] EMERGENCY ROOM  [address] [address]  [phone]  Dept: [phone]     Dashawn Horton MD  12/31/21 5537

## 2022-01-01 NOTE — ED PROVIDER NOTES
eMERGENCY dEPARTMENT PROGRESS NOTE        FINAL IMPRESSION    1. Alcoholic intoxication without complication (H)    2. Hyponatremia    3. Infection due to 2019 novel coronavirus    4. Hypoxia    5. Hypokalemia         ED COURSE AND MEDICAL DECISION MAKING  Patient was signed out to me by Dr. Horton at 10:07 PM    2:28 AM Updated patient on results and plan for admission.  2:41 AM I discussed the case with hospitalist, Dr. Willis, who accepts the patient for admission.      Mesfin Lee is a 71 year old male who presented to the ED for evaluation of alcohol intoxication. Patient was drinking at a bar tonight and after 4-5 drinks he was unable to walk. At time of sign out pending labs, CT, and disposition.    Patient's labs show a hyponatremia.  Also intoxicated.  Patient subsequently developed some hypoxia here.  Chest x-ray is negative.  He does have a positive Covid test.  I think this in addition to his difficulty getting around I do think patient needs to be admitted.  Discussed with the hospitalist.  Given some Valium here for anxiety and possible early alcohol withdrawal    At the conclusion of the encounter I discussed the results of all of the tests and the disposition. The questions were answered. The patient or family acknowledged understanding and was agreeable with the care plan.     DISCHARGE PRESCRIPTIONS  Current Discharge Medication List          LAB  Pertinent labs results reviewed   Results for orders placed or performed during the hospital encounter of 12/31/21   Head CT w/o contrast    Impression    IMPRESSION:  1.  No CT evidence for acute intracranial process.  2.  Brain atrophy and presumed chronic microvascular ischemic changes as above.  3.  No change.   Chest XR,  PA & LAT    Impression    IMPRESSION: Stable cardiomediastinal silhouette. No focal consolidation or pleural effusion. Degenerative change osseous structures. Atherosclerotic aorta. Chronic compression deformities lower thoracic  spine.   Ammonia (on ice)   Result Value Ref Range    Ammonia 37 (H) 11 - 35 umol/L   Alcohol level blood   Result Value Ref Range    Alcohol, Blood 166 (H) None detected mg/dL   Comprehensive metabolic panel   Result Value Ref Range    Sodium 128 (L) 136 - 145 mmol/L    Potassium 3.1 (L) 3.5 - 5.0 mmol/L    Chloride 94 (L) 98 - 107 mmol/L    Carbon Dioxide (CO2) 21 (L) 22 - 31 mmol/L    Anion Gap 13 5 - 18 mmol/L    Urea Nitrogen 12 8 - 28 mg/dL    Creatinine 1.12 0.70 - 1.30 mg/dL    Calcium 8.3 (L) 8.5 - 10.5 mg/dL    Glucose 100 70 - 125 mg/dL    Alkaline Phosphatase 57 45 - 120 U/L    AST 33 0 - 40 U/L    ALT 26 0 - 45 U/L    Protein Total 7.5 6.0 - 8.0 g/dL    Albumin 3.6 3.5 - 5.0 g/dL    Bilirubin Total 0.6 0.0 - 1.0 mg/dL    GFR Estimate 71 >60 mL/min/1.73m2   Result Value Ref Range    Lipase 16 0 - 52 U/L   CBC with platelets and differential   Result Value Ref Range    WBC Count 8.4 4.0 - 11.0 10e3/uL    RBC Count 4.39 (L) 4.40 - 5.90 10e6/uL    Hemoglobin 14.9 13.3 - 17.7 g/dL    Hematocrit 42.9 40.0 - 53.0 %    MCV 98 78 - 100 fL    MCH 33.9 (H) 26.5 - 33.0 pg    MCHC 34.7 31.5 - 36.5 g/dL    RDW 13.4 10.0 - 15.0 %    Platelet Count 138 (L) 150 - 450 10e3/uL    % Neutrophils 48 %    % Lymphocytes 40 %    % Monocytes 12 %    % Eosinophils 0 %    % Basophils 0 %    % Immature Granulocytes 0 %    NRBCs per 100 WBC 0 <1 /100    Absolute Neutrophils 4.1 1.6 - 8.3 10e3/uL    Absolute Lymphocytes 3.3 0.8 - 5.3 10e3/uL    Absolute Monocytes 1.0 0.0 - 1.3 10e3/uL    Absolute Eosinophils 0.0 0.0 - 0.7 10e3/uL    Absolute Basophils 0.0 0.0 - 0.2 10e3/uL    Absolute Immature Granulocytes 0.0 <=0.4 10e3/uL    Absolute NRBCs 0.0 10e3/uL   Asymptomatic COVID-19 Virus (Coronavirus) by PCR Nasopharyngeal    Specimen: Nasopharyngeal; Swab   Result Value Ref Range    SARS CoV2 PCR Positive (A) Negative   Result Value Ref Range    Magnesium 1.8 1.8 - 2.6 mg/dL   Comprehensive metabolic panel   Result Value Ref Range     Sodium 131 (L) 136 - 145 mmol/L    Potassium 3.3 (L) 3.5 - 5.0 mmol/L    Chloride 96 (L) 98 - 107 mmol/L    Carbon Dioxide (CO2) 25 22 - 31 mmol/L    Anion Gap 10 5 - 18 mmol/L    Urea Nitrogen 9 8 - 28 mg/dL    Creatinine 0.94 0.70 - 1.30 mg/dL    Calcium 8.0 (L) 8.5 - 10.5 mg/dL    Glucose 97 70 - 125 mg/dL    Alkaline Phosphatase 52 45 - 120 U/L    AST 37 0 - 40 U/L    ALT 29 0 - 45 U/L    Protein Total 7.2 6.0 - 8.0 g/dL    Albumin 3.4 (L) 3.5 - 5.0 g/dL    Bilirubin Total 0.6 0.0 - 1.0 mg/dL    GFR Estimate 87 >60 mL/min/1.73m2   Result Value Ref Range    Lactate Dehydrogenase 140 125 - 220 U/L   Result Value Ref Range    Troponin I 0.04 0.00 - 0.29 ng/mL   Result Value Ref Range    INR 1.22 (H) 0.85 - 1.15   Partial thromboplastin time   Result Value Ref Range    aPTT 32 22 - 38 Seconds   Fibrinogen activity   Result Value Ref Range    Fibrinogen Activity 417 170 - 490 mg/dL   D dimer quantitative   Result Value Ref Range    D-Dimer Quantitative 0.41 0.00 - 0.50 ug/mL FEU   CRP inflammation   Result Value Ref Range    CRP 6.0 (H) 0.0-<0.8 mg/dL   Result Value Ref Range    Phosphorus 3.1 2.5 - 4.5 mg/dL   CBC with platelets and differential   Result Value Ref Range    WBC Count 6.4 4.0 - 11.0 10e3/uL    RBC Count 4.29 (L) 4.40 - 5.90 10e6/uL    Hemoglobin 14.5 13.3 - 17.7 g/dL    Hematocrit 42.4 40.0 - 53.0 %    MCV 99 78 - 100 fL    MCH 33.8 (H) 26.5 - 33.0 pg    MCHC 34.2 31.5 - 36.5 g/dL    RDW 13.7 10.0 - 15.0 %    Platelet Count 121 (L) 150 - 450 10e3/uL    % Neutrophils 54 %    % Lymphocytes 34 %    % Monocytes 12 %    % Eosinophils 0 %    % Basophils 0 %    % Immature Granulocytes 0 %    NRBCs per 100 WBC 0 <1 /100    Absolute Neutrophils 3.4 1.6 - 8.3 10e3/uL    Absolute Lymphocytes 2.2 0.8 - 5.3 10e3/uL    Absolute Monocytes 0.8 0.0 - 1.3 10e3/uL    Absolute Eosinophils 0.0 0.0 - 0.7 10e3/uL    Absolute Basophils 0.0 0.0 - 0.2 10e3/uL    Absolute Immature Granulocytes 0.0 <=0.4 10e3/uL    Absolute  NRBCs 0.0 10e3/uL   Result Value Ref Range    Potassium 3.3 (L) 3.5 - 5.0 mmol/L   ABO and Rh   Result Value Ref Range    ABO/RH(D) O POS     SPECIMEN EXPIRATION DATE [id number]          RADIOLOGY    Pertinent imaging reviewed   Please see official radiology report.  Chest XR,  PA & LAT   Final Result   IMPRESSION: Stable cardiomediastinal silhouette. No focal consolidation or pleural effusion. Degenerative change osseous structures. Atherosclerotic aorta. Chronic compression deformities lower thoracic spine.      Head CT w/o contrast   Final Result   IMPRESSION:   1.  No CT evidence for acute intracranial process.   2.  Brain atrophy and presumed chronic microvascular ischemic changes as above.   3.  No change.             Bienvenido Tilley MD  01/01/22 0558

## 2022-01-01 NOTE — ED TRIAGE NOTES
"Pt arrived via EMS from [place]. Pt reportedly drank 4 gin & tonic beverages and was unable to stand/walk on his own. States he's had \"the shakes\" for several months and has neuropathy in both feet.  "

## 2022-01-01 NOTE — PLAN OF CARE
Patient is alert and oriented. Up with sba. Denies pain, nausea or shortness of breath. CIWA score of 4 due to tremors. Patient is on scheduled valium and gabapentin. Lung diminished with some expiratory wheezes. Patient weaned of O2 and sating above 90%.

## 2022-01-01 NOTE — H&P
"Hospital Medicine Service History and Physical  [facility]: [facility]    Mesfin Lee is a 71 year old male who  has a past medical history of Hepatitis C. PE, obesity  Chief Complaint: etoh intoxication, difficulty walking    Assessment and Plan  Covid-19 & hypoxia  Start dexamethasone, continue O2 NC  Covid orderset  vaccinated    Alcohol intoxication with risk for complicated withdrawal  CIWA order set  Home gabapentin  Hold escitalopram, trazodone for seizure risk  Pt would like Lexapro restarted (for panic disorder) when possible    Hyponatremia  Initial sodium 128  Has received IVF    Hypokalemia  Initial potassium 3.1, protocol ordered  Check magnesium    Thrombocytopenia  Initial platelets 138, mild, no anemia    HTN  Home lisinopril    DVTP: will continue home apixaban  Code Status: No Order Full until intoxication clears  Disposition: Inpatient   Estimated body mass index is 36.07 kg/m  as calculated from the following:    Height as of 1/22/21: 1.88 m (6' 2\").    Weight as of 1/25/21: 127.4 kg (280 lb 14.4 oz).    History of Present Illness  Mesfin Lee was out at a bar drinking this evening. He presented via ambulance for difficulty walking, and says he didn't have that problem before he drank. Reports 4-5 drinks. He says he only drinks when he goes out to eat, but doesn't specify how often that is. He denies prior hospitalization for withdrawal or prior withdrawal seizures, but he does say that he is expecting to withdrawal from alcohol.  He says he does have \"the shakes\" which she has had for a long time, says his tremors do not improve with alcohol consumption or worsen with cessation.  Denies shortness of breath at home prior to presentation, denies chest pain, abdominal pain.  In the ED patient was afebrile and stable vitals.  Covid test was positive.  Patient had blood alcohol 166.  CT head negative for acute finding.  Chest x-ray showed chronic degenerative changes and " compression deformities.  Received DuoNeb, Valium, IV fluid. He desatted in the ED to mid 80's on RA with reported wheezing and SOB. Voiding normally. Denies prior covid infecton. Is vaccinated x2.   All other systems reviewed and are negative    Appears nad  Anicteric conjunctiva, PERRL  moist mucous membranes, poor dentition  Trachea midline, no jvd  cta, Respiratory effort normal on 2.5L NC  rrr, no murmur  Abdomen soft, nondistended, nontender  no edema, clubbing absent  Skin normal temperature, dry  Fairly normal affect, alert  Vital signs reviewed by me    Wt Readings from Last 4 Encounters:   10/26/17 140.8 kg (310 lb 4.8 oz)        reports that he has quit smoking. He does not have any smokeless tobacco history on file. He reports current alcohol use. He reports that he does not use drugs.  family history includes Dementia in his father; No Known Problems in his brother, daughter, maternal aunt, maternal grandfather, maternal grandmother, maternal uncle, mother, paternal aunt, paternal grandfather, paternal grandmother, paternal uncle, sister, and son.   has a past surgical history that includes Wrist surgery and Us Guided Needle Placement (1/18/2021).   No Known Allergies    Haider Willis MD, MPH  [facility]   Phone: #[phone]

## 2022-01-01 NOTE — PHARMACY-ADMISSION MEDICATION HISTORY
Pharmacy Note - Admission Medication History    Pertinent Provider Information:      ______________________________________________________________________    Prior To Admission (PTA) med list completed and updated in EMR.       PTA Med List   Medication Sig Last Dose     escitalopram oxalate (LEXAPRO) 10 MG tablet [ESCITALOPRAM OXALATE (LEXAPRO) 10 MG TABLET] Take 10 mg by mouth every evening. Past Week at Unknown time     melatonin 5 mg Tab tablet [MELATONIN 5 MG TAB TABLET] Take 1 tablet (5 mg total) by mouth at bedtime. (Patient taking differently: Take 10 mg by mouth At Bedtime ) Past Week at Unknown time     traZODone (DESYREL) 50 MG tablet Take  mg by mouth At Bedtime  Past Week at Unknown time       Information source(s): Patient and CareEverwhere/[facility]  Method of interview communication: phone    Summary of Changes to PTA Med List  New: none  Discontinued: Tylenol, Eliquis, baclofen, bisacodyl supp, Voltaren, gabapentin, lidocaine, lisinopril, omeprazole, Miralax, propranolol, Senna-D  Changed: melatonin 10mg, trazodone 50-100mg range    Patient was asked about OTC/herbal products specifically.  PTA med list reflects this.    In the past week, patient estimated taking medication this percent of the time: unable to assess    Allergies were reviewed, assessed, and updated with the patient.      Patient does not use any multi-dose medications prior to admission.    The information provided in this note is only as accurate as the sources available at the time of the update(s).    Thank you for the opportunity to participate in the care of this patient.    Lynn Meyer [facility]  1/1/2022 7:58 AM

## 2022-01-01 NOTE — PROGRESS NOTES
Patient admitted in the early hours of this AM after he presented to the emergency department with inability to walk while he was celebrating New Year's nupur at a bar and had consumed multiple drinks  Patient was incidentally found to have COVID-19 infection.    Patient has received 2 vaccines against COVID-19 has not received the booster.  He denies any history of complicated alcohol withdrawal.  He is not interested in alcohol withdrawal treatment either  Patient is a   [facility]  He tells me he is really not interested in getting hold of medications.  He would like to have some help with constipation amongst other things.  Patient is saturating 96% on 2 L of oxygen    The following interventions are being made today:  1/.  Scheduled MiraLAX for constipation  2/.  Wean him off oxygen to maintain O2 sats at 92% on room air  3/.  Scheduled Valium 3 times a day  4/.  Scheduled metoprolol twice a day  5/.  Thiamine daily  6/.  Stop IV fluids  7/.  Patient is not desirous of taking apixaban hence that will be discontinued.  8/.  Anticipate discharge possibly tomorrow.

## 2022-01-01 NOTE — ED NOTES
Pts oxygen sats dropped down to 88% RA. Writer went into the room pt was wheezing and short of breath. PT repositioned and boosted up in bed at this time.  Pt used the urinal and had 200 ml output.  MD notified and duo neb ordered

## 2022-01-02 LAB
ANION GAP SERPL CALCULATED.3IONS-SCNC: 12 MMOL/L (ref 5–18)
BUN SERPL-MCNC: 15 MG/DL (ref 8–28)
C REACTIVE PROTEIN LHE: 4.6 MG/DL (ref 0–0.8)
CALCIUM SERPL-MCNC: 8.8 MG/DL (ref 8.5–10.5)
CHLORIDE BLD-SCNC: 100 MMOL/L (ref 98–107)
CK SERPL-CCNC: 149 U/L (ref 30–190)
CO2 SERPL-SCNC: 23 MMOL/L (ref 22–31)
CREAT SERPL-MCNC: 0.82 MG/DL (ref 0.7–1.3)
D DIMER PPP FEU-MCNC: 0.44 UG/ML FEU (ref 0–0.5)
ERYTHROCYTE [DISTWIDTH] IN BLOOD BY AUTOMATED COUNT: 13.3 % (ref 10–15)
FIBRINOGEN PPP-MCNC: 494 MG/DL (ref 170–490)
GFR SERPL CREATININE-BSD FRML MDRD: >90 ML/MIN/1.73M2
GLUCOSE BLD-MCNC: 126 MG/DL (ref 70–125)
HCT VFR BLD AUTO: 43.2 % (ref 40–53)
HGB BLD-MCNC: 14.5 G/DL (ref 13.3–17.7)
HOLD SPECIMEN: NORMAL
MCH RBC QN AUTO: 34.4 PG (ref 26.5–33)
MCHC RBC AUTO-ENTMCNC: 33.6 G/DL (ref 31.5–36.5)
MCV RBC AUTO: 103 FL (ref 78–100)
PLATELET # BLD AUTO: 122 10E3/UL (ref 150–450)
POTASSIUM BLD-SCNC: 4.1 MMOL/L (ref 3.5–5)
RBC # BLD AUTO: 4.21 10E6/UL (ref 4.4–5.9)
SODIUM SERPL-SCNC: 135 MMOL/L (ref 136–145)
SODIUM SERPL-SCNC: 135 MMOL/L (ref 136–145)
SODIUM SERPL-SCNC: 136 MMOL/L (ref 136–145)
WBC # BLD AUTO: 6.6 10E3/UL (ref 4–11)

## 2022-01-02 PROCEDURE — 36415 COLL VENOUS BLD VENIPUNCTURE: CPT | Performed by: HOSPITALIST

## 2022-01-02 PROCEDURE — 258N000003 HC RX IP 258 OP 636: Performed by: HOSPITALIST

## 2022-01-02 PROCEDURE — 84295 ASSAY OF SERUM SODIUM: CPT | Performed by: HOSPITALIST

## 2022-01-02 PROCEDURE — 80048 BASIC METABOLIC PNL TOTAL CA: CPT | Performed by: HOSPITALIST

## 2022-01-02 PROCEDURE — 85384 FIBRINOGEN ACTIVITY: CPT | Performed by: HOSPITALIST

## 2022-01-02 PROCEDURE — 120N000001 HC R&B MED SURG/OB

## 2022-01-02 PROCEDURE — 85027 COMPLETE CBC AUTOMATED: CPT | Performed by: HOSPITALIST

## 2022-01-02 PROCEDURE — 82550 ASSAY OF CK (CPK): CPT | Performed by: HOSPITALIST

## 2022-01-02 PROCEDURE — 250N000009 HC RX 250: Performed by: HOSPITALIST

## 2022-01-02 PROCEDURE — 250N000011 HC RX IP 250 OP 636: Performed by: HOSPITALIST

## 2022-01-02 PROCEDURE — 86141 C-REACTIVE PROTEIN HS: CPT | Performed by: HOSPITALIST

## 2022-01-02 PROCEDURE — 250N000013 HC RX MED GY IP 250 OP 250 PS 637: Performed by: INTERNAL MEDICINE

## 2022-01-02 PROCEDURE — 85379 FIBRIN DEGRADATION QUANT: CPT | Performed by: HOSPITALIST

## 2022-01-02 PROCEDURE — 99233 SBSQ HOSP IP/OBS HIGH 50: CPT | Performed by: HOSPITALIST

## 2022-01-02 PROCEDURE — 250N000013 HC RX MED GY IP 250 OP 250 PS 637: Performed by: HOSPITALIST

## 2022-01-02 PROCEDURE — XW033E5 INTRODUCTION OF REMDESIVIR ANTI-INFECTIVE INTO PERIPHERAL VEIN, PERCUTANEOUS APPROACH, NEW TECHNOLOGY GROUP 5: ICD-10-PCS | Performed by: HOSPITALIST

## 2022-01-02 RX ORDER — ALBUTEROL SULFATE 90 UG/1
2 AEROSOL, METERED RESPIRATORY (INHALATION) EVERY 6 HOURS PRN
Status: DISCONTINUED | OUTPATIENT
Start: 2022-01-02 | End: 2022-01-09

## 2022-01-02 RX ORDER — LORAZEPAM 2 MG/ML
INJECTION INTRAMUSCULAR
Status: DISPENSED
Start: 2022-01-02 | End: 2022-01-03

## 2022-01-02 RX ADMIN — DIAZEPAM 5 MG: 5 TABLET ORAL at 13:58

## 2022-01-02 RX ADMIN — CLONIDINE HYDROCHLORIDE 0.1 MG: 0.1 TABLET ORAL at 20:25

## 2022-01-02 RX ADMIN — ENOXAPARIN SODIUM 40 MG: 100 INJECTION SUBCUTANEOUS at 09:23

## 2022-01-02 RX ADMIN — FOLIC ACID 1 MG: 1 TABLET ORAL at 09:00

## 2022-01-02 RX ADMIN — LORAZEPAM 2 MG: 1 TABLET ORAL at 18:01

## 2022-01-02 RX ADMIN — DIAZEPAM 5 MG: 5 TABLET ORAL at 20:26

## 2022-01-02 RX ADMIN — LORAZEPAM 1 MG: 1 TABLET ORAL at 16:22

## 2022-01-02 RX ADMIN — ALBUTEROL SULFATE 2 PUFF: 90 AEROSOL, METERED RESPIRATORY (INHALATION) at 13:59

## 2022-01-02 RX ADMIN — GABAPENTIN 200 MG: 100 CAPSULE ORAL at 09:00

## 2022-01-02 RX ADMIN — LORAZEPAM 1 MG: 1 TABLET ORAL at 10:34

## 2022-01-02 RX ADMIN — DIAZEPAM 5 MG: 5 TABLET ORAL at 09:00

## 2022-01-02 RX ADMIN — REMDESIVIR 200 MG: 100 INJECTION, POWDER, LYOPHILIZED, FOR SOLUTION INTRAVENOUS at 09:30

## 2022-01-02 RX ADMIN — METOPROLOL TARTRATE 50 MG: 50 TABLET, FILM COATED ORAL at 09:00

## 2022-01-02 RX ADMIN — MULTIPLE VITAMINS W/ MINERALS TAB 1 TABLET: TAB at 09:00

## 2022-01-02 RX ADMIN — Medication 100 MG: at 09:00

## 2022-01-02 RX ADMIN — METOPROLOL TARTRATE 12.5 MG: 25 TABLET, FILM COATED ORAL at 20:26

## 2022-01-02 RX ADMIN — DEXAMETHASONE SODIUM PHOSPHATE 6 MG: 10 INJECTION, SOLUTION INTRAMUSCULAR; INTRAVENOUS at 04:25

## 2022-01-02 RX ADMIN — SODIUM CHLORIDE 50 ML: 9 INJECTION, SOLUTION INTRAVENOUS at 09:30

## 2022-01-02 RX ADMIN — Medication 5 MG: at 20:32

## 2022-01-02 RX ADMIN — LORAZEPAM 1 MG: 1 TABLET ORAL at 04:33

## 2022-01-02 RX ADMIN — CLONIDINE HYDROCHLORIDE 0.1 MG: 0.1 TABLET ORAL at 04:25

## 2022-01-02 RX ADMIN — ALBUTEROL SULFATE 2 PUFF: 90 AEROSOL, METERED RESPIRATORY (INHALATION) at 18:47

## 2022-01-02 RX ADMIN — GABAPENTIN 200 MG: 100 CAPSULE ORAL at 13:58

## 2022-01-02 RX ADMIN — DEXTROSE MONOHYDRATE: 50 INJECTION, SOLUTION INTRAVENOUS at 11:42

## 2022-01-02 RX ADMIN — GABAPENTIN 200 MG: 100 CAPSULE ORAL at 20:25

## 2022-01-02 RX ADMIN — LORAZEPAM 1 MG: 1 TABLET ORAL at 09:22

## 2022-01-02 ASSESSMENT — ACTIVITIES OF DAILY LIVING (ADL)
ADLS_ACUITY_SCORE: 16

## 2022-01-02 NOTE — PROGRESS NOTES
Hospitalist Progress Note    Assessment/Plan    Active Problems:    Hyponatremia    Hypokalemia    Hypoxia    Alcoholic intoxication without complication (H)    Infection due to 2019 novel coronavirus  71-year-old patient with history of alcohol abuse presented to the hospital by ambulance for difficulty walking after drinking alcohol he always had shakiness that does not get worse with alcohol, he is was concerned about possible alcohol withdrawal, in the ER blood alcohol level is elevated, sodium level was 128,, he was found to be hypoxic and tested positive for COVID-19       acute respiratory failure with hypoxia  Secondary to COVID-19 infection, diagnosed with positive test in the ER  Chest x-ray did not show any infiltrate, requiring 1 to 2 L oxygen, started on remdesivir and dexamethasone,  -Monitor oxygen requirement and titrate.  Also  -Albuterol as needed for wheezing has been ordered  Started on Lovenox 40 daily may consider switching to twice daily based on his BMI-if platelet count continues to be stable      Weakness acute alcohol intoxication, concerning for alcohol withdrawal  Patient was having difficulty walking after he drank too much, he does have shakiness at baseline, started on CIWA protocol and receiving Ativan with good effect,  -We will continue to monitor it closely, ordered thiamine and multivitamin,  -Holding escitalopram and trazodone for seizures, resumed gabapentin,  -We will order CK level to check for rhabdomyolysis    Acute hyponatremia   secondary to dehydration and alcoholism, sodium level 128,    After IV fluids sodium level improved to 135, overcorrected in a short period of time  Will order D5 water and recheck sodium level expecting sodium level to go down, discussed with the nursing staff  -Addendum  Repeat sodium level came back 136, after 4 hours of starting D5 water, there was concern about fluid overload patient be congested and has diminished breath sounds, will hold on  IV fluid for now  Continue monitor sodium level    Hypertension currently hypotensive  Holding lisinopril for now, metoprolol with holding parameters due to low blood pressure,      Hypokalemia  Potassium level was 3.1 improved after replacement    Barriers to Discharge: Alcohol withdrawal, oxygen requirement,    Anticipated discharge date/Disposition: TBD    Subjective  Patient was seen today, he was shaky and he states he still feels tired overall, he has pain in his l feet,  and also being bothered by cough I explained to him the plan of care and answered his questions    Objective    Vital signs in last 24 hours  Temp:  [97  F (36.1  C)-99.5  F (37.5  C)] 99.5  F (37.5  C)  Pulse:  [52-76] 55  Resp:  [16-22] 18  BP: ()/(70-92) 90/75  FiO2 (%):  [1 %] 1 %  SpO2:  [90 %-97 %] 94 % [unfilled] O2 Device: Nasal cannula    Weight:   [unfilled] Weight change:     Intake/Output last 3 shifts  I/O last 3 completed shifts:  In: 480 [P.O.:480]  Out: 1125 [Urine:1125]  Body mass index is 40.98 kg/m .    Physical Exam:  General Appearance: Alert, oriented x3, tremulous  HEENT: Normocephalic. No scleral icterus, . Mucous membranes moist.  Heart: :Regular rate and rhythm, normal S1 ,S2, No murmurs, no JVD, no pedal edema   Lungs: Diminished breath sounds n bilaterally mild wheezing but no crackles  Abdomen: Soft, non tender, no rebound or rigidity, non distended, bowel sounds present.  Extremity: No deformity. No joint swelling.  Neurologic: Alert, Oriented x3, speech is intact, Muscle Power 3/5 of bilateral upper and lower extremities      Pertinent Labs   Lab Results: personally reviewed.   Recent Labs   Lab 01/02/22  0714 01/01/22 0334 12/31/21 2046   * 131* 128*   CO2 23 25 21*   BUN 15 9 12   ALBUMIN  --  3.4* 3.6   ALKPHOS  --  52 57   ALT  --  29 26   AST  --  37 33     Recent Labs   Lab 01/02/22  0714 01/01/22 0334 12/31/21 2045   WBC 6.6 6.4 8.4   HGB 14.5 14.5 14.9   HCT 43.2 42.4 42.9   *  121* 138*     Recent Labs   Lab 01/01/22  0334   TROPONINI 0.04     Invalid input(s): POCGLUFGR    Medications  Drug and lactation database from the [place] National Library of Medicine:  http://toxnet.nlm.nih.gov/cgi-bin/sis/htmlgen?LACT          Advanced Care Planning:   Discharge Planning discussed with patient  Total time with this patient is 35 min with 50% of time spent in examining the patient, reviewing records, discussing plan of care and counseling, 50% of time spent in coordination of care.  Care discussed and coordinated with ROWENA.      Paula Hernandez MD  Internal Medicine Hospitalist  1/2/2022

## 2022-01-02 NOTE — PROVIDER NOTIFICATION
"Provider Notified: per request provider notified lab result. \"Sodium recheck 136\"       @1657 Provider Notified: \"Lung sound not the greatest. Diminished with fine crackles. Do you want to continue the fluid? Please advise\"      Discussed with Dr. Hernandez, follow sodium recheck, stop fluid for now and monitor oxygen need. If requiring increase Oxygen need, we'll consider diuretics.     Will monitor and continue plan of care.   "

## 2022-01-02 NOTE — PLAN OF CARE
Problem: Adult Inpatient Plan of Care  Goal: Absence of Hospital-Acquired Illness or Injury  Intervention: Identify and Manage Fall Risk  Recent Flowsheet Documentation  Taken 1/2/2022 1610 by Luis M Segundo RN  Safety Promotion/Fall Prevention:    nonskid shoes/slippers when out of bed    mobility aid in reach    lighting adjusted    patient and family education    fall prevention program maintained    activity supervised     Problem: Substance Misuse (Alcohol Withdrawal)  Goal: Readiness for Change Identified  Outcome: Improving     Problem: Gas Exchange Impaired  Goal: Optimal Gas Exchange   Outcome: Improving   Patient alert and oriented. Up sitting on edge of bed most of this shift. Voiding using urinals at bed side. VSS except BP elevated. Scheduled BP medication given with improvement in pressure. LS diminished with fine crackles and intermittent expiratory wheezes. On 2L NC O2 Sat 96%. Patient now Saline locked per MD. On CIWA protocol Max score 13. Available medication given per protocol. Safety alarm in place. Will monitor and continue plan of care.

## 2022-01-02 NOTE — PLAN OF CARE
Problem: Gas Exchange Impaired  Goal: Optimal Gas Exchange  Outcome: Declining     Patient felt more short of breath at rest this evening. He feels it is hard just to get air in. His SpO2 was at 90-92% but patient was placed on 1L NC for comfort and his saturation and anxiety improved. Breath sounds are diminished and wheezy. He has a strong congested cough.     Problem: Alcohol Withdrawal  Goal: Alcohol Withdrawal Symptom Control  Outcome: Change based on patient need/priority     Patient is tremulous, diaphoretic and anxious at times. Scheduled Gabapentin and Valium, but patient did require a PRN Ativan with improvement in symptoms. Will continue to closely monitor.

## 2022-01-02 NOTE — PLAN OF CARE
Patient is alert and oriented. Up with sba. Denies pain or nausea. Patient is short of breath and dyspneic with activity. Lungs diminished with inspiratory and expiratory wheezes. Requiring 1L of 02 via nasal cannula. Albuterol inhaler ordered and given this afternoon. Patient started on lovenox, remdesivir and decadron today. CIWA scores of 10 and 9 this shift. Lorazepam given per protocol. Patient also receives scheduled valium and gabapentin.

## 2022-01-02 NOTE — PROGRESS NOTES
Hospitalist cross cover note    Patient admitted for COVID-19 infection and alcohol intoxication with risk of complicated withdrawal    Patient's S-Citalopram and trazodone were held at admission with concern that they possibly could increase his risk for seizures    Now requesting medication for insomnia    Plan: Melatonin 5 mg nightly as needed which is his routine home dose    Luis Enrique Asher MD  Cross cover hospitalist  [facility]

## 2022-01-02 NOTE — UTILIZATION REVIEW
Inpatient appropriate    Admission Status; Secondary Review Determination       Under the authority of the Utilization Management Committee, the utilization review process indicated a secondary review on the above patient. The review outcome is based on review of the medical records, discussions with staff, and applying clinical experience noted on the date of the review.     (x) Inpatient Status Appropriate - This patient's medical care is consistent with medical management for inpatient care and reasonable inpatient medical practice.     RATIONALE FOR DETERMINATION   71 years old male with past medical history significant for alcohol abuse, hypertension presented to the ED with a concern of alcohol intoxication.  Patient found to have positive COVID-19 and hyponatremia.  He also developed acute hypoxic respiratory failure.  Patient required supplemental oxygen.  He currently on IV steroids and remdesivir for treatment of COVID-19.  Patient will remain hospitalized for management of his respiratory failure due to COVID-19 and also hyponatremia due to dehydration and alcoholism    At the time of admission with the information available to the attending physician more than 2 nights Hospital complex care was anticipated, based on patient risk of adverse outcome if treated as outpatient and complex care required. Inpatient admission is appropriate based on the Medicare guidelines.     The information on this document is developed by the utilization review team in order for the business office to ensure compliance. This only denotes the appropriateness of proper admission status and does not reflect the quality of care rendered.   The definitions of Inpatient Status and Observation Status used in making the determination above are those provided in the CMS Coverage Manual, Chapter 1 and Chapter 6, section 70.4.   Sincerely,   Maxime French MD  Utilization Review  Physician Advisor  [facility].

## 2022-01-02 NOTE — PLAN OF CARE
A/Ox4, has tremor at baseline.  on 1L O2 NC for patient comfort, patient feels short of breath though sats remain stable. Encouraging patient to prone or at least lay on his side, shortness of breath feels worse when supine. Hypertensive at beginning of shift in 170's systolic, now in 140's, on gale catapres. VS otherwise stable.  On IV decadron. CIWA scores of 3 and 9, 1mg PO ativan given at approx 0430 for score of 9. IV SL. Urinal voiding. Alarms on for safety. No c/o pain. Slept on and off.     Problem: Gas Exchange Impaired  Goal: Optimal Gas Exchange  Outcome: No Change

## 2022-01-03 LAB
ANION GAP SERPL CALCULATED.3IONS-SCNC: 11 MMOL/L (ref 5–18)
BUN SERPL-MCNC: 17 MG/DL (ref 8–28)
C REACTIVE PROTEIN LHE: 3 MG/DL (ref 0–0.8)
CALCIUM SERPL-MCNC: 8.7 MG/DL (ref 8.5–10.5)
CHLORIDE BLD-SCNC: 102 MMOL/L (ref 98–107)
CO2 SERPL-SCNC: 25 MMOL/L (ref 22–31)
CREAT SERPL-MCNC: 0.8 MG/DL (ref 0.7–1.3)
D DIMER PPP FEU-MCNC: 1.48 UG/ML FEU (ref 0–0.5)
ERYTHROCYTE [DISTWIDTH] IN BLOOD BY AUTOMATED COUNT: 13.4 % (ref 10–15)
FIBRINOGEN PPP-MCNC: 481 MG/DL (ref 170–490)
GFR SERPL CREATININE-BSD FRML MDRD: >90 ML/MIN/1.73M2
GLUCOSE BLD-MCNC: 119 MG/DL (ref 70–125)
HCT VFR BLD AUTO: 40.3 % (ref 40–53)
HGB BLD-MCNC: 13.3 G/DL (ref 13.3–17.7)
MCH RBC QN AUTO: 33.9 PG (ref 26.5–33)
MCHC RBC AUTO-ENTMCNC: 33 G/DL (ref 31.5–36.5)
MCV RBC AUTO: 103 FL (ref 78–100)
PLATELET # BLD AUTO: 131 10E3/UL (ref 150–450)
POTASSIUM BLD-SCNC: 3.9 MMOL/L (ref 3.5–5)
RBC # BLD AUTO: 3.92 10E6/UL (ref 4.4–5.9)
SODIUM SERPL-SCNC: 135 MMOL/L (ref 136–145)
SODIUM SERPL-SCNC: 136 MMOL/L (ref 136–145)
SODIUM SERPL-SCNC: 136 MMOL/L (ref 136–145)
SODIUM SERPL-SCNC: 138 MMOL/L (ref 136–145)
SODIUM SERPL-SCNC: 138 MMOL/L (ref 136–145)
WBC # BLD AUTO: 6.3 10E3/UL (ref 4–11)

## 2022-01-03 PROCEDURE — 36415 COLL VENOUS BLD VENIPUNCTURE: CPT | Performed by: HOSPITALIST

## 2022-01-03 PROCEDURE — 250N000013 HC RX MED GY IP 250 OP 250 PS 637: Performed by: HOSPITALIST

## 2022-01-03 PROCEDURE — 86141 C-REACTIVE PROTEIN HS: CPT | Performed by: HOSPITALIST

## 2022-01-03 PROCEDURE — 250N000011 HC RX IP 250 OP 636: Performed by: HOSPITALIST

## 2022-01-03 PROCEDURE — 250N000013 HC RX MED GY IP 250 OP 250 PS 637: Performed by: INTERNAL MEDICINE

## 2022-01-03 PROCEDURE — 85027 COMPLETE CBC AUTOMATED: CPT | Performed by: HOSPITALIST

## 2022-01-03 PROCEDURE — 99233 SBSQ HOSP IP/OBS HIGH 50: CPT | Performed by: INTERNAL MEDICINE

## 2022-01-03 PROCEDURE — 258N000003 HC RX IP 258 OP 636: Performed by: HOSPITALIST

## 2022-01-03 PROCEDURE — 85379 FIBRIN DEGRADATION QUANT: CPT | Performed by: HOSPITALIST

## 2022-01-03 PROCEDURE — 85384 FIBRINOGEN ACTIVITY: CPT | Performed by: HOSPITALIST

## 2022-01-03 PROCEDURE — 82310 ASSAY OF CALCIUM: CPT | Performed by: HOSPITALIST

## 2022-01-03 PROCEDURE — 250N000009 HC RX 250: Performed by: HOSPITALIST

## 2022-01-03 PROCEDURE — 120N000001 HC R&B MED SURG/OB

## 2022-01-03 PROCEDURE — 84295 ASSAY OF SERUM SODIUM: CPT | Performed by: HOSPITALIST

## 2022-01-03 RX ORDER — AMOXICILLIN 250 MG
1 CAPSULE ORAL 2 TIMES DAILY
Status: DISCONTINUED | OUTPATIENT
Start: 2022-01-03 | End: 2022-01-07

## 2022-01-03 RX ORDER — GABAPENTIN 600 MG/1
600 TABLET ORAL ONCE
Status: COMPLETED | OUTPATIENT
Start: 2022-01-03 | End: 2022-01-03

## 2022-01-03 RX ORDER — DIAZEPAM 10 MG/2ML
5-10 INJECTION, SOLUTION INTRAMUSCULAR; INTRAVENOUS EVERY 30 MIN PRN
Status: DISCONTINUED | OUTPATIENT
Start: 2022-01-03 | End: 2022-01-14

## 2022-01-03 RX ORDER — BENZONATATE 100 MG/1
100 CAPSULE ORAL 3 TIMES DAILY PRN
Status: DISCONTINUED | OUTPATIENT
Start: 2022-01-03 | End: 2022-01-19 | Stop reason: HOSPADM

## 2022-01-03 RX ORDER — MULTIPLE VITAMINS W/ MINERALS TAB 9MG-400MCG
1 TAB ORAL DAILY
Status: DISCONTINUED | OUTPATIENT
Start: 2022-01-04 | End: 2022-01-19 | Stop reason: HOSPADM

## 2022-01-03 RX ORDER — FLUMAZENIL 0.1 MG/ML
0.2 INJECTION, SOLUTION INTRAVENOUS
Status: DISCONTINUED | OUTPATIENT
Start: 2022-01-03 | End: 2022-01-19 | Stop reason: HOSPADM

## 2022-01-03 RX ORDER — OLANZAPINE 5 MG/1
5-10 TABLET, ORALLY DISINTEGRATING ORAL EVERY 6 HOURS PRN
Status: DISCONTINUED | OUTPATIENT
Start: 2022-01-03 | End: 2022-01-14

## 2022-01-03 RX ORDER — GABAPENTIN 300 MG/1
300 CAPSULE ORAL EVERY 8 HOURS
Status: COMPLETED | OUTPATIENT
Start: 2022-01-09 | End: 2022-01-10

## 2022-01-03 RX ORDER — GABAPENTIN 300 MG/1
600 CAPSULE ORAL EVERY 8 HOURS
Status: COMPLETED | OUTPATIENT
Start: 2022-01-07 | End: 2022-01-08

## 2022-01-03 RX ORDER — GABAPENTIN 300 MG/1
900 CAPSULE ORAL EVERY 8 HOURS
Status: COMPLETED | OUTPATIENT
Start: 2022-01-04 | End: 2022-01-06

## 2022-01-03 RX ORDER — GABAPENTIN 100 MG/1
100 CAPSULE ORAL EVERY 8 HOURS
Status: COMPLETED | OUTPATIENT
Start: 2022-01-11 | End: 2022-01-13

## 2022-01-03 RX ORDER — DIAZEPAM 5 MG
10 TABLET ORAL EVERY 30 MIN PRN
Status: DISCONTINUED | OUTPATIENT
Start: 2022-01-03 | End: 2022-01-14

## 2022-01-03 RX ORDER — FOLIC ACID 1 MG/1
1 TABLET ORAL DAILY
Status: DISCONTINUED | OUTPATIENT
Start: 2022-01-03 | End: 2022-01-03

## 2022-01-03 RX ORDER — HALOPERIDOL 5 MG/ML
2.5-5 INJECTION INTRAMUSCULAR EVERY 6 HOURS PRN
Status: DISCONTINUED | OUTPATIENT
Start: 2022-01-03 | End: 2022-01-14

## 2022-01-03 RX ORDER — CLONIDINE HYDROCHLORIDE 0.1 MG/1
0.1 TABLET ORAL EVERY 8 HOURS
Status: DISCONTINUED | OUTPATIENT
Start: 2022-01-03 | End: 2022-01-03

## 2022-01-03 RX ADMIN — BENZONATATE 100 MG: 100 CAPSULE ORAL at 15:00

## 2022-01-03 RX ADMIN — DIAZEPAM 5 MG: 5 TABLET ORAL at 08:24

## 2022-01-03 RX ADMIN — Medication 100 MG: at 08:24

## 2022-01-03 RX ADMIN — ALBUTEROL SULFATE 2 PUFF: 90 AEROSOL, METERED RESPIRATORY (INHALATION) at 03:20

## 2022-01-03 RX ADMIN — LORAZEPAM 2 MG: 1 TABLET ORAL at 08:55

## 2022-01-03 RX ADMIN — MULTIPLE VITAMINS W/ MINERALS TAB 1 TABLET: TAB at 08:24

## 2022-01-03 RX ADMIN — SENNOSIDES AND DOCUSATE SODIUM 1 TABLET: 50; 8.6 TABLET ORAL at 20:18

## 2022-01-03 RX ADMIN — METOPROLOL TARTRATE 12.5 MG: 25 TABLET, FILM COATED ORAL at 08:24

## 2022-01-03 RX ADMIN — LORAZEPAM 1 MG: 1 TABLET ORAL at 13:41

## 2022-01-03 RX ADMIN — METOPROLOL TARTRATE 12.5 MG: 25 TABLET, FILM COATED ORAL at 20:19

## 2022-01-03 RX ADMIN — ALBUTEROL SULFATE 2 PUFF: 90 AEROSOL, METERED RESPIRATORY (INHALATION) at 09:44

## 2022-01-03 RX ADMIN — GABAPENTIN 600 MG: 600 TABLET, FILM COATED ORAL at 16:58

## 2022-01-03 RX ADMIN — REMDESIVIR 100 MG: 100 INJECTION, POWDER, LYOPHILIZED, FOR SOLUTION INTRAVENOUS at 17:31

## 2022-01-03 RX ADMIN — ENOXAPARIN SODIUM 40 MG: 100 INJECTION SUBCUTANEOUS at 08:24

## 2022-01-03 RX ADMIN — GABAPENTIN 200 MG: 100 CAPSULE ORAL at 08:24

## 2022-01-03 RX ADMIN — Medication 5 MG: at 22:10

## 2022-01-03 RX ADMIN — FOLIC ACID 1 MG: 1 TABLET ORAL at 08:24

## 2022-01-03 RX ADMIN — CLONIDINE HYDROCHLORIDE 0.1 MG: 0.1 TABLET ORAL at 20:18

## 2022-01-03 RX ADMIN — SODIUM CHLORIDE 50 ML: 9 INJECTION, SOLUTION INTRAVENOUS at 17:32

## 2022-01-03 RX ADMIN — LORAZEPAM 1 MG: 1 TABLET ORAL at 15:00

## 2022-01-03 RX ADMIN — ACETAMINOPHEN 650 MG: 325 TABLET ORAL at 08:56

## 2022-01-03 RX ADMIN — DIAZEPAM 5 MG: 5 TABLET ORAL at 13:27

## 2022-01-03 RX ADMIN — POLYETHYLENE GLYCOL 3350 17 G: 17 POWDER, FOR SOLUTION ORAL at 08:24

## 2022-01-03 RX ADMIN — GABAPENTIN 200 MG: 100 CAPSULE ORAL at 13:27

## 2022-01-03 RX ADMIN — CLONIDINE HYDROCHLORIDE 0.1 MG: 0.1 TABLET ORAL at 03:14

## 2022-01-03 RX ADMIN — ALBUTEROL SULFATE 2 PUFF: 90 AEROSOL, METERED RESPIRATORY (INHALATION) at 17:37

## 2022-01-03 RX ADMIN — DEXAMETHASONE SODIUM PHOSPHATE 6 MG: 10 INJECTION, SOLUTION INTRAMUSCULAR; INTRAVENOUS at 03:14

## 2022-01-03 RX ADMIN — CLONIDINE HYDROCHLORIDE 0.1 MG: 0.1 TABLET ORAL at 11:41

## 2022-01-03 RX ADMIN — DIAZEPAM 10 MG: 5 TABLET ORAL at 22:10

## 2022-01-03 RX ADMIN — DIAZEPAM 10 MG: 5 TABLET ORAL at 16:58

## 2022-01-03 ASSESSMENT — ACTIVITIES OF DAILY LIVING (ADL)
ADLS_ACUITY_SCORE: 12
ADLS_ACUITY_SCORE: 12
ADLS_ACUITY_SCORE: 16
ADLS_ACUITY_SCORE: 12
ADLS_ACUITY_SCORE: 16
ADLS_ACUITY_SCORE: 12
ADLS_ACUITY_SCORE: 16
ADLS_ACUITY_SCORE: 12
ADLS_ACUITY_SCORE: 12
ADLS_ACUITY_SCORE: 16
ADLS_ACUITY_SCORE: 12
ADLS_ACUITY_SCORE: 16
ADLS_ACUITY_SCORE: 12

## 2022-01-03 NOTE — PROGRESS NOTES
Hospitalist Progress Note    Assessment/Plan    Active Problems:    Hyponatremia    Hypokalemia    Hypoxia    Alcoholic intoxication without complication (H)    Infection due to 2019 novel coronavirus  71-year-old patient with history of alcohol abuse presented to the hospital by ambulance for difficulty walking after drinking alcohol he always had shakiness that does not get worse with alcohol, he is was concerned about possible alcohol withdrawal, in the ER blood alcohol level is elevated, sodium level was 128,, he was found to be hypoxic and tested positive for COVID-19       1/3 :       Respiratory status improving  On 1.5  Liters of oxygen  Continue dexamethasone, remdesivir, lovenox for DVT prevention    Scoring   11 CIWA, no confusion/hallucination noted but significant tremors  Has received 8 mg of ativan in 24 hrs already  Will switch from ativan to valium on CIWA, start gabapentin taper      Not medically ready for discharge at this time.  On oxygen, going through alcohol w/d        A/p :       acute respiratory failure with hypoxia  Secondary to COVID-19 infection, diagnosed with positive test in the ER  Chest x-ray did not show any infiltrate, requiring 1 to 2 L oxygen, started on remdesivir and dexamethasone,  -Monitor oxygen requirement and titrate.  Also  -Albuterol as needed for wheezing has been ordered  Started on Lovenox 40 daily may consider switching to twice daily based on his BMI-if platelet count continues to be stable      Weakness acute alcohol intoxication, concerning for alcohol withdrawal  Patient was having difficulty walking after he drank too much, he does have shakiness at baseline, started on CIWA protocol and receiving Ativan with good effect,  -We will continue to monitor it closely, ordered thiamine and multivitamin,  -Holding escitalopram and trazodone for seizures, resumed gabapentin,  -We will order CK level to check for rhabdomyolysis    Acute hyponatremia   secondary to  dehydration and alcoholism, sodium level 128,    After IV fluids sodium level improved to 135, overcorrected in a short period of time  Will order D5 water and recheck sodium level expecting sodium level to go down, discussed with the nursing staff  -Addendum  Repeat sodium level came back 136, after 4 hours of starting D5 water, there was concern about fluid overload patient be congested and has diminished breath sounds, will hold on IV fluid for now  Continue monitor sodium level    Hypertension currently hypotensive  Holding lisinopril for now, metoprolol with holding parameters due to low blood pressure,      Hypokalemia  Potassium level was 3.1 improved after replacement    Barriers to Discharge: Alcohol withdrawal, oxygen requirement,    Anticipated discharge date/Disposition: TBD        Objective    Vital signs in last 24 hours  Temp:  [97.9  F (36.6  C)-98.3  F (36.8  C)] 98.3  F (36.8  C)  Pulse:  [51-71] 53  Resp:  [20-24] 20  BP: (112-194)/(75-96) 112/80  SpO2:  [89 %-98 %] 97 % [unfilled] O2 Device: Nasal cannula    Weight:   [unfilled] Weight change:     Intake/Output last 3 shifts  I/O last 3 completed shifts:  In: 300 [P.O.:300]  Out: 800 [Urine:800]  Body mass index is 40.98 kg/m .    Physical Exam:  General Appearance: Alert, oriented x3, tremulous  HEENT: Normocephalic. No scleral icterus, . Mucous membranes moist.  Heart: :Regular rate and rhythm, normal S1 ,S2, No murmurs, no JVD, no pedal edema   Lungs: Diminished breath sounds n bilaterally mild wheezing but no crackles  Abdomen: Soft, non tender, no rebound or rigidity, non distended, bowel sounds present.  Extremity: No deformity. No joint swelling.  Neurologic: Alert, Oriented x3, speech is intact, Muscle Power 3/5 of bilateral upper and lower extremities      Pertinent Labs   Lab Results: personally reviewed.   Recent Labs   Lab 01/03/22  1150 01/03/22  0544 01/02/22  2351 01/02/22  1623 01/02/22  0714 01/01/22  0334 12/31/21  2046   NA  135* 138  138 136   < > 135* 131* 128*   CO2  --  25  --   --  23 25 21*   BUN  --  17  --   --  15 9 12   ALBUMIN  --   --   --   --   --  3.4* 3.6   ALKPHOS  --   --   --   --   --  52 57   ALT  --   --   --   --   --  29 26   AST  --   --   --   --   --  37 33    < > = values in this interval not displayed.     Recent Labs   Lab 01/03/22  0545 01/02/22  0714 01/01/22  0334   WBC 6.3 6.6 6.4   HGB 13.3 14.5 14.5   HCT 40.3 43.2 42.4   * 122* 121*     Recent Labs   Lab 01/01/22  0334   TROPONINI 0.04     Invalid input(s): POCGLUFGR    Medications  Drug and lactation database from the [place] National Library of Medicine:  http://toxnet.nlm.nih.gov/cgi-bin/sis/htmlgen?LACT          Advanced Care Planning:   Discharge Planning discussed with patient  Total time with this patient is 35 min with 50% of time spent in examining the patient, reviewing records, discussing plan of care and counseling, 50% of time spent in coordination of care.  Care discussed and coordinated with RN.

## 2022-01-03 NOTE — PROVIDER NOTIFICATION
Txt paged Dr. Cavanaugh 3:07 PM 01/03/22     David, RN #[id number]  372: D.F.  Per CIWA protocol to notify MD if patient has received 8 mg Lorazepam in 24 hours to adjust orders. Already has scheduled Valium TID. Please advise. Thanks!     Pending response       **ADDENDUM 3:40 PM 01/03/22 - Discussed with Dr. Cavanaugh - Switching to Valium for CIWA medication and other orders to be adjusted per MD.

## 2022-01-03 NOTE — PLAN OF CARE
Problem: Gas Exchange Impaired  Goal: Optimal Gas Exchange  Outcome: No Change     Problem: Adult Inpatient Plan of Care  Goal: Optimal Comfort and Wellbeing  Outcome: Improving     Denies pain. Continues on q 6hr sodium checks. Up to bathroom Ax1-2 with walker, gait unsteady and dyspnea with activity. CIWA score 6 overnight. Audible wheezing, Prn albuterol given. Largely incontinent of urine x1 requiring bed change. No discharge plans at this time.

## 2022-01-04 ENCOUNTER — APPOINTMENT (OUTPATIENT)
Dept: OCCUPATIONAL THERAPY | Facility: CLINIC | Age: 72
DRG: 896 | End: 2022-01-04
Attending: INTERNAL MEDICINE
Payer: COMMERCIAL

## 2022-01-04 ENCOUNTER — APPOINTMENT (OUTPATIENT)
Dept: RADIOLOGY | Facility: CLINIC | Age: 72
DRG: 896 | End: 2022-01-04
Attending: INTERNAL MEDICINE
Payer: COMMERCIAL

## 2022-01-04 LAB
ANION GAP SERPL CALCULATED.3IONS-SCNC: 8 MMOL/L (ref 5–18)
BUN SERPL-MCNC: 14 MG/DL (ref 8–28)
C REACTIVE PROTEIN LHE: 2.3 MG/DL (ref 0–0.8)
CALCIUM SERPL-MCNC: 8.6 MG/DL (ref 8.5–10.5)
CHLORIDE BLD-SCNC: 103 MMOL/L (ref 98–107)
CO2 SERPL-SCNC: 27 MMOL/L (ref 22–31)
CREAT SERPL-MCNC: 0.77 MG/DL (ref 0.7–1.3)
D DIMER PPP FEU-MCNC: 0.51 UG/ML FEU (ref 0–0.5)
ERYTHROCYTE [DISTWIDTH] IN BLOOD BY AUTOMATED COUNT: 13.4 % (ref 10–15)
FIBRINOGEN PPP-MCNC: 472 MG/DL (ref 170–490)
GFR SERPL CREATININE-BSD FRML MDRD: >90 ML/MIN/1.73M2
GLUCOSE BLD-MCNC: 123 MG/DL (ref 70–125)
HCT VFR BLD AUTO: 43.1 % (ref 40–53)
HGB BLD-MCNC: 14.5 G/DL (ref 13.3–17.7)
HOLD SPECIMEN: NORMAL
MAGNESIUM SERPL-MCNC: 2 MG/DL (ref 1.8–2.6)
MCH RBC QN AUTO: 34.3 PG (ref 26.5–33)
MCHC RBC AUTO-ENTMCNC: 33.6 G/DL (ref 31.5–36.5)
MCV RBC AUTO: 102 FL (ref 78–100)
PHOSPHATE SERPL-MCNC: 3 MG/DL (ref 2.5–4.5)
PLATELET # BLD AUTO: 132 10E3/UL (ref 150–450)
POTASSIUM BLD-SCNC: 3.8 MMOL/L (ref 3.5–5)
RBC # BLD AUTO: 4.23 10E6/UL (ref 4.4–5.9)
SODIUM SERPL-SCNC: 138 MMOL/L (ref 136–145)
WBC # BLD AUTO: 5.1 10E3/UL (ref 4–11)

## 2022-01-04 PROCEDURE — 250N000013 HC RX MED GY IP 250 OP 250 PS 637: Performed by: HOSPITALIST

## 2022-01-04 PROCEDURE — 250N000011 HC RX IP 250 OP 636: Performed by: HOSPITALIST

## 2022-01-04 PROCEDURE — 97166 OT EVAL MOD COMPLEX 45 MIN: CPT | Mod: GO

## 2022-01-04 PROCEDURE — 80048 BASIC METABOLIC PNL TOTAL CA: CPT | Performed by: HOSPITALIST

## 2022-01-04 PROCEDURE — 36415 COLL VENOUS BLD VENIPUNCTURE: CPT | Performed by: HOSPITALIST

## 2022-01-04 PROCEDURE — 85379 FIBRIN DEGRADATION QUANT: CPT | Performed by: HOSPITALIST

## 2022-01-04 PROCEDURE — 85027 COMPLETE CBC AUTOMATED: CPT | Performed by: HOSPITALIST

## 2022-01-04 PROCEDURE — 97110 THERAPEUTIC EXERCISES: CPT | Mod: GO

## 2022-01-04 PROCEDURE — 71045 X-RAY EXAM CHEST 1 VIEW: CPT

## 2022-01-04 PROCEDURE — 250N000011 HC RX IP 250 OP 636: Performed by: INTERNAL MEDICINE

## 2022-01-04 PROCEDURE — 84100 ASSAY OF PHOSPHORUS: CPT | Performed by: INTERNAL MEDICINE

## 2022-01-04 PROCEDURE — 250N000013 HC RX MED GY IP 250 OP 250 PS 637: Performed by: INTERNAL MEDICINE

## 2022-01-04 PROCEDURE — 86141 C-REACTIVE PROTEIN HS: CPT | Performed by: HOSPITALIST

## 2022-01-04 PROCEDURE — 83735 ASSAY OF MAGNESIUM: CPT | Performed by: INTERNAL MEDICINE

## 2022-01-04 PROCEDURE — 97535 SELF CARE MNGMENT TRAINING: CPT | Mod: GO

## 2022-01-04 PROCEDURE — 99233 SBSQ HOSP IP/OBS HIGH 50: CPT | Performed by: INTERNAL MEDICINE

## 2022-01-04 PROCEDURE — 250N000009 HC RX 250: Performed by: HOSPITALIST

## 2022-01-04 PROCEDURE — 120N000001 HC R&B MED SURG/OB

## 2022-01-04 PROCEDURE — 258N000003 HC RX IP 258 OP 636: Performed by: HOSPITALIST

## 2022-01-04 PROCEDURE — 85384 FIBRINOGEN ACTIVITY: CPT | Performed by: HOSPITALIST

## 2022-01-04 RX ORDER — CLONIDINE HYDROCHLORIDE 0.1 MG/1
0.2 TABLET ORAL EVERY 6 HOURS
Status: DISCONTINUED | OUTPATIENT
Start: 2022-01-04 | End: 2022-01-07

## 2022-01-04 RX ORDER — HYDRALAZINE HYDROCHLORIDE 20 MG/ML
10 INJECTION INTRAMUSCULAR; INTRAVENOUS EVERY 4 HOURS PRN
Status: DISCONTINUED | OUTPATIENT
Start: 2022-01-04 | End: 2022-01-18

## 2022-01-04 RX ORDER — HYDRALAZINE HYDROCHLORIDE 20 MG/ML
10 INJECTION INTRAMUSCULAR; INTRAVENOUS EVERY 6 HOURS PRN
Status: DISCONTINUED | OUTPATIENT
Start: 2022-01-04 | End: 2022-01-04

## 2022-01-04 RX ORDER — METOPROLOL TARTRATE 25 MG/1
25 TABLET, FILM COATED ORAL 2 TIMES DAILY
Status: DISCONTINUED | OUTPATIENT
Start: 2022-01-04 | End: 2022-01-18

## 2022-01-04 RX ADMIN — Medication 100 MG: at 08:20

## 2022-01-04 RX ADMIN — CLONIDINE HYDROCHLORIDE 0.1 MG: 0.1 TABLET ORAL at 03:47

## 2022-01-04 RX ADMIN — REMDESIVIR 100 MG: 100 INJECTION, POWDER, LYOPHILIZED, FOR SOLUTION INTRAVENOUS at 18:01

## 2022-01-04 RX ADMIN — DEXAMETHASONE SODIUM PHOSPHATE 6 MG: 10 INJECTION, SOLUTION INTRAMUSCULAR; INTRAVENOUS at 03:48

## 2022-01-04 RX ADMIN — ALBUTEROL SULFATE 2 PUFF: 90 AEROSOL, METERED RESPIRATORY (INHALATION) at 03:46

## 2022-01-04 RX ADMIN — METOPROLOL TARTRATE 12.5 MG: 25 TABLET, FILM COATED ORAL at 08:22

## 2022-01-04 RX ADMIN — FOLIC ACID 1 MG: 1 TABLET ORAL at 08:21

## 2022-01-04 RX ADMIN — BENZONATATE 100 MG: 100 CAPSULE ORAL at 16:12

## 2022-01-04 RX ADMIN — GABAPENTIN 900 MG: 300 CAPSULE ORAL at 00:43

## 2022-01-04 RX ADMIN — BENZONATATE 100 MG: 100 CAPSULE ORAL at 08:21

## 2022-01-04 RX ADMIN — SENNOSIDES AND DOCUSATE SODIUM 1 TABLET: 50; 8.6 TABLET ORAL at 08:21

## 2022-01-04 RX ADMIN — DIAZEPAM 10 MG: 5 TABLET ORAL at 18:43

## 2022-01-04 RX ADMIN — MULTIPLE VITAMINS W/ MINERALS TAB 1 TABLET: TAB at 08:21

## 2022-01-04 RX ADMIN — HYDRALAZINE HYDROCHLORIDE 10 MG: 20 INJECTION INTRAMUSCULAR; INTRAVENOUS at 16:59

## 2022-01-04 RX ADMIN — GABAPENTIN 900 MG: 300 CAPSULE ORAL at 08:21

## 2022-01-04 RX ADMIN — ALBUTEROL SULFATE 2 PUFF: 90 AEROSOL, METERED RESPIRATORY (INHALATION) at 12:17

## 2022-01-04 RX ADMIN — CLONIDINE HYDROCHLORIDE 0.1 MG: 0.1 TABLET ORAL at 12:13

## 2022-01-04 RX ADMIN — METOPROLOL TARTRATE 25 MG: 25 TABLET, FILM COATED ORAL at 20:00

## 2022-01-04 RX ADMIN — ENOXAPARIN SODIUM 40 MG: 100 INJECTION SUBCUTANEOUS at 08:22

## 2022-01-04 RX ADMIN — SODIUM CHLORIDE 50 ML: 9 INJECTION, SOLUTION INTRAVENOUS at 18:02

## 2022-01-04 RX ADMIN — CLONIDINE HYDROCHLORIDE 0.2 MG: 0.1 TABLET ORAL at 18:43

## 2022-01-04 RX ADMIN — GABAPENTIN 900 MG: 300 CAPSULE ORAL at 16:11

## 2022-01-04 RX ADMIN — SENNOSIDES AND DOCUSATE SODIUM 1 TABLET: 50; 8.6 TABLET ORAL at 20:00

## 2022-01-04 RX ADMIN — Medication 5 MG: at 22:15

## 2022-01-04 RX ADMIN — ACETAMINOPHEN 650 MG: 325 TABLET ORAL at 08:21

## 2022-01-04 ASSESSMENT — ACTIVITIES OF DAILY LIVING (ADL)
ADLS_ACUITY_SCORE: 12

## 2022-01-04 NOTE — PLAN OF CARE
Problem: Adult Inpatient Plan of Care  Goal: Readiness for Transition of Care  Outcome: No Change     Patient alert and oriented this shift, call light within reach and bed alarm on. Appears to be resting comfortably between cares. Reporting mild pain in his feet and chest from coughing however denies need for interventions. Continues on 1L NC to maintain sats >90%. Frequent productive cough. Scoring 5 and 7 on CIWA, no interventions required. PRN melatonin for sleep overnight. PRN albuterol used for wheezing and comfort.

## 2022-01-04 NOTE — PROGRESS NOTES
0700 - 1900 Progress Note:    Medicating ETOH withdrawal per CIWA protocol with intermittent symptom relief.  Weaned to 1L O2 from 4L - lungs diminished with wheezing throuhgout. SOB/dyspnea with exertion - PRN albuterol per MAR along with tesslon for congestion/productive cough. Humidified O2 provided for morning nosebleed that has now resolved. Remains alert and oriented - using call light appropriately.

## 2022-01-04 NOTE — PROVIDER NOTIFICATION
Txt paged Dr. Cavanaugh 4:14 PM 01/04/22     David RN #[id number]  372: D.F.  /93. Can we order PRN if appropriate? CIWA scores remain low. Thanks!     Pending response     **ADDENDUM 4:20 PM 01/04/22 - PRN IV Hydralazine ordered

## 2022-01-04 NOTE — PLAN OF CARE
Problem: Gas Exchange Impaired  Goal: Optimal Gas Exchange  Outcome: No Change     Problem: Adult Inpatient Plan of Care  Goal: Optimal Comfort and Wellbeing  Outcome: Declining     Patient is alert and oriented, call light appropriate and within reach. Bed alarm on for safety. Patient reports feeling very weak today. Needing assistance with repositioning while in bed. Continues on 1L NC to maintain sats >90%. Scoring 8 on CIWA, meeting protocol orders for prn Valium.

## 2022-01-04 NOTE — PROGRESS NOTES
01/04/22 1115   Quick Adds   Type of Visit Initial Occupational Therapy Evaluation   Living Environment   People in home alone   Current Living Arrangements other (see comments)  ([facility])   Living Environment Comments RTS, WIS?  (Pt had trouble talking due to SOB.)   Self-Care   Usual Activity Tolerance good   Current Activity Tolerance fair   Disability/Function   Hearing Difficulty or Deaf no   Wear Glasses or Blind no   General Information   Onset of Illness/Injury or Date of Surgery 12/31/21   Referring Physician Dr. Willis Cavanaugh   Patient/Family Therapy Goal Statement (OT) To get better.  I can't breath.   Cognitive Status Examination   Orientation Status person;place;time   Follows Commands WNL   Range of Motion Comprehensive   General Range of Motion no range of motion deficits identified   Coordination   Upper Extremity Coordination Left UE impaired;Right UE impaired   Gross Motor Coordination Impaired   Fine Motor Coordination Impaired   Bed Mobility   Bed Mobility scooting/bridging   Scooting/Bridging [place] (Bed Mobility) maximum assist (25% patient effort);2 person assist   Assistive Device (Bed Mobility) bed rails;draw sheet  (reverse trendelenburg )   Transfers   Transfers   (Too anxious, with difficulty breathing to try sitting or OOB)   Activities of Daily Living   BADL Assessment grooming   Grooming Assessment   [place] Level (Grooming) minimum assist (75% patient effort)   Position (Grooming) sitting up in bed   Comment (Grooming) Used R hand to wash face but unable to sustain grasp on washcloth.  Assist needed.   Clinical Impression   Criteria for Skilled Therapeutic Interventions Met (OT) yes   OT Diagnosis decreased indep with ADLs due to ETOH w/d and COVID+   OT Problem List-Impairments impacting ADL activity tolerance impaired;coordination;mobility;strength   Assessment of Occupational Performance 5 or more Performance Deficits   Identified Performance Deficits dressing,  toileting, G/H, bathing, trsfs.   Planned Therapy Interventions (OT) ADL retraining;bed mobility training;strengthening;progressive activity/exercise   Clinical Decision Making Complexity (OT) moderate complexity   Therapy Frequency (OT) Daily   Predicted Duration of Therapy 7 days   Risk & Benefits of therapy have been explained patient   OT Discharge Planning    OT Discharge Recommendation (DC Rec) Transitional Care Facility;Home with assist  (If home will currently need assist with all ADLs and trsfs.)   OT Rationale for DC Rec Pt is not at his baseline with self cares or trsfs.   Total Evaluation Time (Minutes)   Total Evaluation Time (Minutes) 15

## 2022-01-04 NOTE — PROGRESS NOTES
Hospitalist Progress Note    Assessment/Plan    Active Problems:    Hyponatremia    Hypokalemia    Hypoxia    Alcoholic intoxication without complication (H)    Infection due to 2019 novel coronavirus  71-year-old patient with history of alcohol abuse presented to the hospital by ambulance for difficulty walking after drinking alcohol he always had shakiness that does not get worse with alcohol, he is was concerned about possible alcohol withdrawal, in the ER blood alcohol level is elevated, sodium level was 128,, he was found to be hypoxic and tested positive for COVID-19       1/4 :       Respiratory status improving  Off oxygen today  Continue dexamethasone, remdesivir, lovenox for DVT prevention    Scoring   11 CIWA, no confusion/hallucination noted but significant tremors  Has received 8 mg of ativan in 24 hrs yesterday  Switched  from ativan to valium on CIWA, on gabapentin taper      Not medically ready for discharge at this time.   going through alcohol w/d  Another 2 days ? - home versus TCU        A/p :       acute respiratory failure with hypoxia  Secondary to COVID-19 infection, diagnosed with positive test in the ER  Chest x-ray did not show any infiltrate, requiring 1 to 2 L oxygen, started on remdesivir and dexamethasone,  -Monitor oxygen requirement and titrate.  Also  -Albuterol as needed for wheezing has been ordered  Started on Lovenox 40 daily may consider switching to twice daily based on his BMI-if platelet count continues to be stable      Weakness acute alcohol intoxication, concerning for alcohol withdrawal  Patient was having difficulty walking after he drank too much, he does have shakiness at baseline, started on CIWA protocol and receiving Ativan with good effect,  -We will continue to monitor it closely, ordered thiamine and multivitamin,  -Holding escitalopram and trazodone for seizures, resumed gabapentin,  -We will order CK level to check for rhabdomyolysis    Acute hyponatremia    secondary to dehydration and alcoholism, sodium level 128,    After IV fluids sodium level improved to 135, overcorrected in a short period of time  Will order D5 water and recheck sodium level expecting sodium level to go down, discussed with the nursing staff  -Addendum  Repeat sodium level came back 136, after 4 hours of starting D5 water, there was concern about fluid overload patient be congested and has diminished breath sounds, will hold on IV fluid for now  Continue monitor sodium level    Hypertension currently hypotensive  Holding lisinopril for now, metoprolol with holding parameters due to low blood pressure,      Hypokalemia  Potassium level was 3.1 improved after replacement    Barriers to Discharge: Alcohol withdrawal, oxygen requirement,    Anticipated discharge date/Disposition: TBD        Objective    Vital signs in last 24 hours  Temp:  [97.9  F (36.6  C)-99.2  F (37.3  C)] 99.2  F (37.3  C)  Pulse:  [54-77] 64  Resp:  [22-24] 22  BP: (154-202)/() 189/86  FiO2 (%):  [1 %] 1 %  SpO2:  [88 %-96 %] 94 % [unfilled] O2 Device: Nasal cannula    Weight:   [unfilled] Weight change:     Intake/Output last 3 shifts  I/O last 3 completed shifts:  In: 1020 [P.O.:1020]  Out: 550 [Urine:550]  Body mass index is 40.98 kg/m .    Physical Exam:  General Appearance: Alert, oriented x3, tremulous  HEENT: Normocephalic. No scleral icterus, . Mucous membranes moist.  Heart: :Regular rate and rhythm, normal S1 ,S2, No murmurs, no JVD, no pedal edema   Lungs: Diminished breath sounds n bilaterally mild wheezing but no crackles  Abdomen: Soft, non tender, no rebound or rigidity, non distended, bowel sounds present.  Extremity: No deformity. No joint swelling.  Neurologic: Alert, Oriented x3, speech is intact, Muscle Power 3/5 of bilateral upper and lower extremities      Pertinent Labs   Lab Results: personally reviewed.   Recent Labs   Lab 01/04/22  0636 01/03/22  1824 01/03/22  1150 01/03/22  0544  01/02/22  1623 01/02/22  0714 01/01/22  0334 12/31/21  2046    136 135* 138  138   < > 135* 131* 128*   CO2 27  --   --  25  --  23 25 21*   BUN 14  --   --  17  --  15 9 12   ALBUMIN  --   --   --   --   --   --  3.4* 3.6   ALKPHOS  --   --   --   --   --   --  52 57   ALT  --   --   --   --   --   --  29 26   AST  --   --   --   --   --   --  37 33    < > = values in this interval not displayed.     Recent Labs   Lab 01/04/22  0636 01/03/22  0545 01/02/22  0714   WBC 5.1 6.3 6.6   HGB 14.5 13.3 14.5   HCT 43.1 40.3 43.2   * 131* 122*     Recent Labs   Lab 01/01/22  0334   TROPONINI 0.04     Invalid input(s): POCGLUF    Medications  Drug and lactation database from the [place] [employer] Library of Medicine:  http://toxnet.nlm.nih.gov/cgi-bin/sis/htmlgen?LACT          Advanced Care Planning:   Discharge Planning discussed with patient  Total time with this patient is 35 min with 50% of time spent in examining the patient, reviewing records, discussing plan of care and counseling, 50% of time spent in coordination of care.  Care discussed and coordinated with RN.

## 2022-01-05 ENCOUNTER — APPOINTMENT (OUTPATIENT)
Dept: OCCUPATIONAL THERAPY | Facility: CLINIC | Age: 72
DRG: 896 | End: 2022-01-05
Payer: COMMERCIAL

## 2022-01-05 ENCOUNTER — APPOINTMENT (OUTPATIENT)
Dept: PHYSICAL THERAPY | Facility: CLINIC | Age: 72
DRG: 896 | End: 2022-01-05
Attending: INTERNAL MEDICINE
Payer: COMMERCIAL

## 2022-01-05 LAB
ANION GAP SERPL CALCULATED.3IONS-SCNC: 10 MMOL/L (ref 5–18)
BNP SERPL-MCNC: 125 PG/ML (ref 0–67)
BUN SERPL-MCNC: 13 MG/DL (ref 8–28)
C REACTIVE PROTEIN LHE: 2.8 MG/DL (ref 0–0.8)
CALCIUM SERPL-MCNC: 8.7 MG/DL (ref 8.5–10.5)
CHLORIDE BLD-SCNC: 101 MMOL/L (ref 98–107)
CO2 SERPL-SCNC: 24 MMOL/L (ref 22–31)
CREAT SERPL-MCNC: 0.81 MG/DL (ref 0.7–1.3)
D DIMER PPP FEU-MCNC: 3.97 UG/ML FEU (ref 0–0.5)
ERYTHROCYTE [DISTWIDTH] IN BLOOD BY AUTOMATED COUNT: 13.4 % (ref 10–15)
FIBRINOGEN PPP-MCNC: 533 MG/DL (ref 170–490)
GFR SERPL CREATININE-BSD FRML MDRD: >90 ML/MIN/1.73M2
GLUCOSE BLD-MCNC: 161 MG/DL (ref 70–125)
HCT VFR BLD AUTO: 42.2 % (ref 40–53)
HGB BLD-MCNC: 14.2 G/DL (ref 13.3–17.7)
MAGNESIUM SERPL-MCNC: 2 MG/DL (ref 1.8–2.6)
MCH RBC QN AUTO: 33.9 PG (ref 26.5–33)
MCHC RBC AUTO-ENTMCNC: 33.6 G/DL (ref 31.5–36.5)
MCV RBC AUTO: 101 FL (ref 78–100)
PLATELET # BLD AUTO: 130 10E3/UL (ref 150–450)
POTASSIUM BLD-SCNC: 3.8 MMOL/L (ref 3.5–5)
RBC # BLD AUTO: 4.19 10E6/UL (ref 4.4–5.9)
SODIUM SERPL-SCNC: 135 MMOL/L (ref 136–145)
WBC # BLD AUTO: 5.8 10E3/UL (ref 4–11)

## 2022-01-05 PROCEDURE — 83880 ASSAY OF NATRIURETIC PEPTIDE: CPT | Performed by: INTERNAL MEDICINE

## 2022-01-05 PROCEDURE — 250N000009 HC RX 250: Performed by: HOSPITALIST

## 2022-01-05 PROCEDURE — 250N000013 HC RX MED GY IP 250 OP 250 PS 637: Performed by: INTERNAL MEDICINE

## 2022-01-05 PROCEDURE — 99233 SBSQ HOSP IP/OBS HIGH 50: CPT | Performed by: INTERNAL MEDICINE

## 2022-01-05 PROCEDURE — 97116 GAIT TRAINING THERAPY: CPT | Mod: GP

## 2022-01-05 PROCEDURE — 85384 FIBRINOGEN ACTIVITY: CPT | Performed by: HOSPITALIST

## 2022-01-05 PROCEDURE — 97162 PT EVAL MOD COMPLEX 30 MIN: CPT | Mod: GP

## 2022-01-05 PROCEDURE — 86141 C-REACTIVE PROTEIN HS: CPT | Performed by: HOSPITALIST

## 2022-01-05 PROCEDURE — 250N000011 HC RX IP 250 OP 636: Performed by: HOSPITALIST

## 2022-01-05 PROCEDURE — 250N000011 HC RX IP 250 OP 636: Performed by: INTERNAL MEDICINE

## 2022-01-05 PROCEDURE — 258N000003 HC RX IP 258 OP 636: Performed by: HOSPITALIST

## 2022-01-05 PROCEDURE — 250N000013 HC RX MED GY IP 250 OP 250 PS 637: Performed by: HOSPITALIST

## 2022-01-05 PROCEDURE — 83735 ASSAY OF MAGNESIUM: CPT | Performed by: INTERNAL MEDICINE

## 2022-01-05 PROCEDURE — 85027 COMPLETE CBC AUTOMATED: CPT | Performed by: HOSPITALIST

## 2022-01-05 PROCEDURE — 80048 BASIC METABOLIC PNL TOTAL CA: CPT | Performed by: HOSPITALIST

## 2022-01-05 PROCEDURE — 97110 THERAPEUTIC EXERCISES: CPT | Mod: GO

## 2022-01-05 PROCEDURE — 120N000001 HC R&B MED SURG/OB

## 2022-01-05 PROCEDURE — 97530 THERAPEUTIC ACTIVITIES: CPT | Mod: GP

## 2022-01-05 PROCEDURE — 85379 FIBRIN DEGRADATION QUANT: CPT | Performed by: HOSPITALIST

## 2022-01-05 PROCEDURE — 36415 COLL VENOUS BLD VENIPUNCTURE: CPT | Performed by: HOSPITALIST

## 2022-01-05 RX ORDER — FUROSEMIDE 10 MG/ML
20 INJECTION INTRAMUSCULAR; INTRAVENOUS EVERY 6 HOURS
Status: DISCONTINUED | OUTPATIENT
Start: 2022-01-05 | End: 2022-01-09

## 2022-01-05 RX ORDER — FUROSEMIDE 10 MG/ML
20 INJECTION INTRAMUSCULAR; INTRAVENOUS EVERY 8 HOURS
Status: DISCONTINUED | OUTPATIENT
Start: 2022-01-05 | End: 2022-01-05 | Stop reason: DRUGHIGH

## 2022-01-05 RX ADMIN — CLONIDINE HYDROCHLORIDE 0.2 MG: 0.1 TABLET ORAL at 12:06

## 2022-01-05 RX ADMIN — MULTIPLE VITAMINS W/ MINERALS TAB 1 TABLET: TAB at 08:06

## 2022-01-05 RX ADMIN — REMDESIVIR 100 MG: 100 INJECTION, POWDER, LYOPHILIZED, FOR SOLUTION INTRAVENOUS at 18:35

## 2022-01-05 RX ADMIN — SENNOSIDES AND DOCUSATE SODIUM 1 TABLET: 50; 8.6 TABLET ORAL at 20:51

## 2022-01-05 RX ADMIN — SENNOSIDES AND DOCUSATE SODIUM 1 TABLET: 50; 8.6 TABLET ORAL at 08:06

## 2022-01-05 RX ADMIN — ALBUTEROL SULFATE 2 PUFF: 90 AEROSOL, METERED RESPIRATORY (INHALATION) at 00:57

## 2022-01-05 RX ADMIN — SODIUM CHLORIDE 50 ML: 9 INJECTION, SOLUTION INTRAVENOUS at 18:35

## 2022-01-05 RX ADMIN — ENOXAPARIN SODIUM 40 MG: 100 INJECTION SUBCUTANEOUS at 08:06

## 2022-01-05 RX ADMIN — CLONIDINE HYDROCHLORIDE 0.2 MG: 0.1 TABLET ORAL at 18:34

## 2022-01-05 RX ADMIN — Medication 100 MG: at 08:07

## 2022-01-05 RX ADMIN — GABAPENTIN 900 MG: 300 CAPSULE ORAL at 16:31

## 2022-01-05 RX ADMIN — FUROSEMIDE 20 MG: 10 INJECTION, SOLUTION INTRAMUSCULAR; INTRAVENOUS at 20:51

## 2022-01-05 RX ADMIN — FUROSEMIDE 20 MG: 10 INJECTION, SOLUTION INTRAMUSCULAR; INTRAVENOUS at 14:54

## 2022-01-05 RX ADMIN — DIAZEPAM 10 MG: 5 TABLET ORAL at 00:53

## 2022-01-05 RX ADMIN — POLYETHYLENE GLYCOL 3350 17 G: 17 POWDER, FOR SOLUTION ORAL at 08:07

## 2022-01-05 RX ADMIN — ALBUTEROL SULFATE 2 PUFF: 90 AEROSOL, METERED RESPIRATORY (INHALATION) at 14:19

## 2022-01-05 RX ADMIN — METOPROLOL TARTRATE 25 MG: 25 TABLET, FILM COATED ORAL at 20:51

## 2022-01-05 RX ADMIN — FOLIC ACID 1 MG: 1 TABLET ORAL at 08:07

## 2022-01-05 RX ADMIN — DEXAMETHASONE SODIUM PHOSPHATE 6 MG: 10 INJECTION, SOLUTION INTRAMUSCULAR; INTRAVENOUS at 04:49

## 2022-01-05 RX ADMIN — GABAPENTIN 900 MG: 300 CAPSULE ORAL at 00:54

## 2022-01-05 RX ADMIN — METOPROLOL TARTRATE 25 MG: 25 TABLET, FILM COATED ORAL at 08:06

## 2022-01-05 RX ADMIN — CLONIDINE HYDROCHLORIDE 0.2 MG: 0.1 TABLET ORAL at 00:53

## 2022-01-05 RX ADMIN — CLONIDINE HYDROCHLORIDE 0.2 MG: 0.1 TABLET ORAL at 06:57

## 2022-01-05 RX ADMIN — GABAPENTIN 900 MG: 300 CAPSULE ORAL at 08:07

## 2022-01-05 ASSESSMENT — ACTIVITIES OF DAILY LIVING (ADL)
ADLS_ACUITY_SCORE: 10
ADLS_ACUITY_SCORE: 12
ADLS_ACUITY_SCORE: 10
ADLS_ACUITY_SCORE: 10
ADLS_ACUITY_SCORE: 12
ADLS_ACUITY_SCORE: 10
ADLS_ACUITY_SCORE: 12
ADLS_ACUITY_SCORE: 10
ADLS_ACUITY_SCORE: 10
ADLS_ACUITY_SCORE: 12
ADLS_ACUITY_SCORE: 10
ADLS_ACUITY_SCORE: 12
ADLS_ACUITY_SCORE: 10
ADLS_ACUITY_SCORE: 10

## 2022-01-05 NOTE — PLAN OF CARE
0700 - 1900 Progress Note:       Problem: Gas Exchange Impaired  Goal: Optimal Gas Exchange  Outcome: Improving   Weaned to room air but intermittently desat to around 88/89% but then recovers < 1 min  ... Using spirometer independently. Continues with productive cough - lungs diminished with expiratory wheezing - PRN albuterol inhaler per MAR.     Problem: Acute Neurologic Deterioration (Alcohol Withdrawal)  Goal: Optimal Neurologic Function  Outcome: Improving  CIWA shift scores of 6 / 7 / 5 / then 14 -- Medicated with Valium per MAR.

## 2022-01-05 NOTE — PLAN OF CARE
VSS, BP elevated, scheduled med given. No pain reported. Was on RA but had to be put back on 1L due to low sats at rest. CIWA 0 for this 4 hour shift, Pt sleeping comfortably. Recheck at 3:10 AM. Productive cough and wheezy breath sounds throughout. Baseline numbness in BLE. Abrasion/scab on R second toe.    Problem: Gas Exchange Impaired  Goal: Optimal Gas Exchange  Outcome: No Change

## 2022-01-05 NOTE — PLAN OF CARE
Problem: Gas Exchange Impaired  Goal: Optimal Gas Exchange  Outcome: Improving  Intervention: Optimize Oxygenation and Ventilation    Problem: Acute Neurologic Deterioration (Alcohol Withdrawal)  Goal: Optimal Neurologic Function  Outcome: Improving       Reporting no pain, CIWA scores 3,5, no PRN medications given.  Reported intermittent anxiety, but not at a concerning level.  Reported feeling better regarding withdrawal symptoms.  General weakness and fatigue.  Remains on 1L O2 nasal canula to maintain sats >90%.  Dyspnea on exertion.  BP still slightly elevated, asymptomatic, continuing to use prescribed medications.  Up to chair for majority of shift.  Able to express needs.

## 2022-01-05 NOTE — PROGRESS NOTES
01/05/22 1030   Quick Adds   Type of Visit Initial PT Evaluation   Living Environment   People in home alone   Current Living Arrangements other (see comments)  ([facility])   Home Accessibility stairs within home   Number of Stairs, Main Entrance none   Number of Stairs, Within Home, Primary greater than 10 stairs  (12)   Stair Railings, Within Home, Primary railings on both sides of stairs   Living Environment Comments Once inside able to stay on one level   Self-Care   Usual Activity Tolerance good   Current Activity Tolerance fair   Equipment Currently Used at Home none   Activity/Exercise/Self-Care Comment Reports being independent with all functional mobility without AD at baseline.   General Information   Onset of Illness/Injury or Date of Surgery 01/01/22   Referring Physician Dr Willis Cavanaugh   Patient/Family Therapy Goals Statement (PT) Go home   Pertinent History of Current Problem (include personal factors and/or comorbidities that impact the POC) Admit for ETOH, covid 19   Existing Precautions/Restrictions fall  (chair/bed alarm)   Strength   Strength Comments BLE generalized weakness grossly throughout   Bed Mobility   Comment (Bed Mobility) In chair   Transfers   Transfer Safety Comments Sit<>stand from recliner and commode to FWW mod A of 1.   Gait/Stairs (Locomotion)   [place] Level (Gait) moderate assist (50% patient effort)   Assistive Device (Gait) walker, front-wheeled   Distance in Feet (Required for LE Total Joints) 20'x1   Pattern (Gait) step-to   Comment (Gait/Stairs) Slow, taxing effort. Fatigues quickly   Clinical Impression   Criteria for Skilled Therapeutic Intervention yes, treatment indicated   PT Diagnosis (PT) Impaired functional mobility   Influenced by the following impairments weakness, fatigue, SOB   Functional limitations due to impairments transfers, gait, stairs   Clinical Presentation Evolving/Changing   Clinical Presentation Rationale Presents as medically diagnosed    Clinical Decision Making (Complexity) moderate complexity   Therapy Frequency (PT) Daily   Predicted Duration of Therapy Intervention (days/wks) 7 days   Planned Therapy Interventions (PT) bed mobility training;gait training;stair training;transfer training;strengthening   Anticipated Equipment Needs at Discharge (PT) walker, rolling  (bariatric)   Risk & Benefits of therapy have been explained evaluation/treatment results reviewed;care plan/treatment goals reviewed;participants voiced agreement with care plan;participants included;patient   PT Discharge Planning    PT Discharge Recommendation (DC Rec) Transitional Care Facility   PT Rationale for DC Rec If pt returns home would requires 24/7 care. Requires mod A of 1 with all mobility, has a flight of stairs into [facility] and is high risk for falls.   Total Evaluation Time   Total Evaluation Time (Minutes) 10

## 2022-01-05 NOTE — PROGRESS NOTES
Hospitalist Progress Note    Assessment/Plan    Active Problems:    Hyponatremia    Hypokalemia    Hypoxia    Alcoholic intoxication without complication (H)    Infection due to 2019 novel coronavirus  71-year-old patient with history of alcohol abuse presented to the hospital by ambulance for difficulty walking after drinking alcohol he always had shakiness that does not get worse with alcohol, he is was concerned about possible alcohol withdrawal, in the ER blood alcohol level is elevated, sodium level was 128,, he was found to be hypoxic and tested positive for COVID-19       1/5 :     Admitted 1/1    Respiratory status stable  On 1-2 liters of oxygen  Continue dexamethasone, remdesivir, lovenox for DVT prevention    Seems fluid overloaded  BNP elevated, will give iv lasix 20 mg q 8    Scoring low on  CIWA now  no confusion/hallucination noted but significant tremors  Switched  from ativan to valium on CIWA, on gabapentin taper      Not medically ready for discharge at this time.   going through alcohol w/d  Another 2 days ? - home versus TCU        A/p :       acute respiratory failure with hypoxia  Secondary to COVID-19 infection, diagnosed with positive test in the ER  Chest x-ray did not show any infiltrate, requiring 1 to 2 L oxygen, started on remdesivir and dexamethasone,  -Monitor oxygen requirement and titrate.  Also  -Albuterol as needed for wheezing has been ordered  Started on Lovenox 40 daily may consider switching to twice daily based on his BMI-if platelet count continues to be stable      Weakness acute alcohol intoxication, concerning for alcohol withdrawal  Patient was having difficulty walking after he drank too much, he does have shakiness at baseline, started on CIWA protocol and receiving Ativan with good effect,  -We will continue to monitor it closely, ordered thiamine and multivitamin,  -Holding escitalopram and trazodone for seizures, resumed gabapentin,  -We will order CK level to check  for rhabdomyolysis    Acute hyponatremia   secondary to dehydration and alcoholism, sodium level 128,    After IV fluids sodium level improved to 135, overcorrected in a short period of time  Will order D5 water and recheck sodium level expecting sodium level to go down, discussed with the nursing staff  -Addendum  Repeat sodium level came back 136, after 4 hours of starting D5 water, there was concern about fluid overload patient be congested and has diminished breath sounds, will hold on IV fluid for now  Continue monitor sodium level    Hypertension currently hypotensive  Holding lisinopril for now, metoprolol with holding parameters due to low blood pressure,      Hypokalemia  Potassium level was 3.1 improved after replacement    Barriers to Discharge: Alcohol withdrawal, oxygen requirement,    Anticipated discharge date/Disposition: TBD        Objective    Vital signs in last 24 hours  Temp:  [97.7  F (36.5  C)-99.2  F (37.3  C)] 97.8  F (36.6  C)  Pulse:  [54-93] 59  Resp:  [20-26] 20  BP: (147-208)/() 155/79  SpO2:  [88 %-95 %] 95 % [unfilled] O2 Device: Nasal cannula    Weight:   [unfilled] Weight change:     Intake/Output last 3 shifts  I/O last 3 completed shifts:  In: 200 [P.O.:200]  Out: 300 [Urine:300]  Body mass index is 40.98 kg/m .    Physical Exam:  General Appearance: Alert, oriented x3, tremulous  HEENT: Normocephalic. No scleral icterus, . Mucous membranes moist.  Heart: :Regular rate and rhythm, normal S1 ,S2, No murmurs, no JVD, no pedal edema   Lungs: Diminished breath sounds n bilaterally mild wheezing but no crackles  Abdomen: Soft, non tender, no rebound or rigidity, non distended, bowel sounds present.  Extremity: No deformity. No joint swelling.  Neurologic: Alert, Oriented x3, speech is intact, Muscle Power 3/5 of bilateral upper and lower extremities      Pertinent Labs   Lab Results: personally reviewed.   Recent Labs   Lab 01/05/22  0918 01/04/22  0636 01/03/22  1318  01/03/22  1150 01/03/22  0544 01/02/22  0714 01/01/22  0334 12/31/21  2046   * 138 136   < > 138  138   < > 131* 128*   CO2 24 27  --   --  25   < > 25 21*   BUN 13 14  --   --  17   < > 9 12   ALBUMIN  --   --   --   --   --   --  3.4* 3.6   ALKPHOS  --   --   --   --   --   --  52 57   ALT  --   --   --   --   --   --  29 26   AST  --   --   --   --   --   --  37 33    < > = values in this interval not displayed.     Recent Labs   Lab 01/05/22  0918 01/04/22  0636 01/03/22  0545   WBC 5.8 5.1 6.3   HGB 14.2 14.5 13.3   HCT 42.2 43.1 40.3   * 132* 131*     Recent Labs   Lab 01/01/22  0334   TROPONINI 0.04     Invalid input(s): POCGLUFGR    Medications  Drug and lactation database from the [place] [employer] Library of Medicine:  http://toxnet.nlm.nih.gov/cgi-bin/sis/htmlgen?LACT          Advanced Care Planning:   Discharge Planning discussed with patient  Total time with this patient is 35 min with 50% of time spent in examining the patient, reviewing records, discussing plan of care and counseling, 50% of time spent in coordination of care.  Care discussed and coordinated with RN.

## 2022-01-05 NOTE — PLAN OF CARE
Problem: Adult Inpatient Plan of Care  Goal: Plan of Care Review  Outcome: No Change  Flowsheets (Taken 1/5/2022 0623)  Outcome Summary: Blood pressures remain slightly elevated, but improving. Requiring 3 L oxygen via nasal cannula to maintain sats >90% over night. Dyspnea on exertion. Lung sounds course with expiratory and inspiratory wheezes. Continues to cough up thick tan sputum. CIWA 10, 5, 5. On K protocol, recheck in am. Anticipated d/c pending clinical progression. Will continue to monitor and follow plan of care.

## 2022-01-05 NOTE — PROVIDER NOTIFICATION
Txt paged Dr. Mirza 6:26 PM 01/04/22     David, RN #[id number]  372: D.F.  Admitted with ETOH withdrawal. Will medicate for CIWA of 14 but /100. IV Hydralazine ineffective. Can I give scheduled Clonidine early? Thanks!     Pending response     **ADDENDUM 6:45 PM 01/04/22 - Scheduled Clonidine and Metoprolol adjusted adjusted along with PRN IV Hydralazine (refer MAR)

## 2022-01-06 ENCOUNTER — APPOINTMENT (OUTPATIENT)
Dept: OCCUPATIONAL THERAPY | Facility: CLINIC | Age: 72
DRG: 896 | End: 2022-01-06
Payer: COMMERCIAL

## 2022-01-06 ENCOUNTER — APPOINTMENT (OUTPATIENT)
Dept: PHYSICAL THERAPY | Facility: CLINIC | Age: 72
DRG: 896 | End: 2022-01-06
Payer: COMMERCIAL

## 2022-01-06 LAB
ANION GAP SERPL CALCULATED.3IONS-SCNC: 9 MMOL/L (ref 5–18)
BASOPHILS # BLD AUTO: 0 10E3/UL (ref 0–0.2)
BASOPHILS NFR BLD AUTO: 0 %
BUN SERPL-MCNC: 14 MG/DL (ref 8–28)
C REACTIVE PROTEIN LHE: 2.7 MG/DL (ref 0–0.8)
CALCIUM SERPL-MCNC: 8.4 MG/DL (ref 8.5–10.5)
CHLORIDE BLD-SCNC: 100 MMOL/L (ref 98–107)
CO2 SERPL-SCNC: 27 MMOL/L (ref 22–31)
CREAT SERPL-MCNC: 0.82 MG/DL (ref 0.7–1.3)
EOSINOPHIL # BLD AUTO: 0.1 10E3/UL (ref 0–0.7)
EOSINOPHIL NFR BLD AUTO: 1 %
ERYTHROCYTE [DISTWIDTH] IN BLOOD BY AUTOMATED COUNT: 13.2 % (ref 10–15)
GFR SERPL CREATININE-BSD FRML MDRD: >90 ML/MIN/1.73M2
GLUCOSE BLD-MCNC: 123 MG/DL (ref 70–125)
HCT VFR BLD AUTO: 41.5 % (ref 40–53)
HGB BLD-MCNC: 14 G/DL (ref 13.3–17.7)
HOLD SPECIMEN: NORMAL
IMM GRANULOCYTES # BLD: 0 10E3/UL
IMM GRANULOCYTES NFR BLD: 0 %
LYMPHOCYTES # BLD AUTO: 3.5 10E3/UL (ref 0.8–5.3)
LYMPHOCYTES NFR BLD AUTO: 52 %
MCH RBC QN AUTO: 34.1 PG (ref 26.5–33)
MCHC RBC AUTO-ENTMCNC: 33.7 G/DL (ref 31.5–36.5)
MCV RBC AUTO: 101 FL (ref 78–100)
MONOCYTES # BLD AUTO: 0.7 10E3/UL (ref 0–1.3)
MONOCYTES NFR BLD AUTO: 11 %
NEUTROPHILS # BLD AUTO: 2.4 10E3/UL (ref 1.6–8.3)
NEUTROPHILS NFR BLD AUTO: 36 %
NRBC # BLD AUTO: 0 10E3/UL
NRBC BLD AUTO-RTO: 0 /100
PLATELET # BLD AUTO: 128 10E3/UL (ref 150–450)
POTASSIUM BLD-SCNC: 3.4 MMOL/L (ref 3.5–5)
POTASSIUM BLD-SCNC: 3.8 MMOL/L (ref 3.5–5)
PROCALCITONIN SERPL-MCNC: 0.02 NG/ML (ref 0–0.49)
RBC # BLD AUTO: 4.11 10E6/UL (ref 4.4–5.9)
SODIUM SERPL-SCNC: 136 MMOL/L (ref 136–145)
WBC # BLD AUTO: 6.7 10E3/UL (ref 4–11)

## 2022-01-06 PROCEDURE — 258N000003 HC RX IP 258 OP 636: Performed by: HOSPITALIST

## 2022-01-06 PROCEDURE — 97530 THERAPEUTIC ACTIVITIES: CPT | Mod: GP

## 2022-01-06 PROCEDURE — 250N000013 HC RX MED GY IP 250 OP 250 PS 637: Performed by: HOSPITALIST

## 2022-01-06 PROCEDURE — 250N000011 HC RX IP 250 OP 636: Performed by: HOSPITALIST

## 2022-01-06 PROCEDURE — 250N000009 HC RX 250: Performed by: HOSPITALIST

## 2022-01-06 PROCEDURE — 97110 THERAPEUTIC EXERCISES: CPT | Mod: GO

## 2022-01-06 PROCEDURE — 36415 COLL VENOUS BLD VENIPUNCTURE: CPT | Performed by: INTERNAL MEDICINE

## 2022-01-06 PROCEDURE — 250N000011 HC RX IP 250 OP 636: Performed by: INTERNAL MEDICINE

## 2022-01-06 PROCEDURE — 250N000013 HC RX MED GY IP 250 OP 250 PS 637: Performed by: INTERNAL MEDICINE

## 2022-01-06 PROCEDURE — 97535 SELF CARE MNGMENT TRAINING: CPT | Mod: GO

## 2022-01-06 PROCEDURE — 250N000013 HC RX MED GY IP 250 OP 250 PS 637: Performed by: FAMILY MEDICINE

## 2022-01-06 PROCEDURE — 120N000001 HC R&B MED SURG/OB

## 2022-01-06 PROCEDURE — 80048 BASIC METABOLIC PNL TOTAL CA: CPT | Performed by: INTERNAL MEDICINE

## 2022-01-06 PROCEDURE — 97110 THERAPEUTIC EXERCISES: CPT | Mod: GP

## 2022-01-06 PROCEDURE — 97116 GAIT TRAINING THERAPY: CPT | Mod: GP

## 2022-01-06 PROCEDURE — 85004 AUTOMATED DIFF WBC COUNT: CPT | Performed by: INTERNAL MEDICINE

## 2022-01-06 PROCEDURE — 86140 C-REACTIVE PROTEIN: CPT | Performed by: INTERNAL MEDICINE

## 2022-01-06 PROCEDURE — 99232 SBSQ HOSP IP/OBS MODERATE 35: CPT | Performed by: INTERNAL MEDICINE

## 2022-01-06 PROCEDURE — 84132 ASSAY OF SERUM POTASSIUM: CPT | Performed by: INTERNAL MEDICINE

## 2022-01-06 PROCEDURE — 84145 PROCALCITONIN (PCT): CPT | Performed by: INTERNAL MEDICINE

## 2022-01-06 RX ORDER — ESCITALOPRAM OXALATE 10 MG/1
10 TABLET ORAL EVERY EVENING
Status: DISCONTINUED | OUTPATIENT
Start: 2022-01-06 | End: 2022-01-19 | Stop reason: HOSPADM

## 2022-01-06 RX ORDER — POTASSIUM CHLORIDE 1500 MG/1
40 TABLET, EXTENDED RELEASE ORAL ONCE
Status: COMPLETED | OUTPATIENT
Start: 2022-01-06 | End: 2022-01-06

## 2022-01-06 RX ADMIN — ALBUTEROL SULFATE 2 PUFF: 90 AEROSOL, METERED RESPIRATORY (INHALATION) at 18:52

## 2022-01-06 RX ADMIN — CLONIDINE HYDROCHLORIDE 0.2 MG: 0.1 TABLET ORAL at 12:15

## 2022-01-06 RX ADMIN — MULTIPLE VITAMINS W/ MINERALS TAB 1 TABLET: TAB at 08:44

## 2022-01-06 RX ADMIN — METOPROLOL TARTRATE 25 MG: 25 TABLET, FILM COATED ORAL at 08:45

## 2022-01-06 RX ADMIN — POLYETHYLENE GLYCOL 3350 17 G: 17 POWDER, FOR SOLUTION ORAL at 08:46

## 2022-01-06 RX ADMIN — METOPROLOL TARTRATE 25 MG: 25 TABLET, FILM COATED ORAL at 21:03

## 2022-01-06 RX ADMIN — FOLIC ACID 1 MG: 1 TABLET ORAL at 08:43

## 2022-01-06 RX ADMIN — POTASSIUM CHLORIDE 40 MEQ: 1500 TABLET, EXTENDED RELEASE ORAL at 08:46

## 2022-01-06 RX ADMIN — SODIUM CHLORIDE 50 ML: 9 INJECTION, SOLUTION INTRAVENOUS at 21:04

## 2022-01-06 RX ADMIN — GABAPENTIN 900 MG: 300 CAPSULE ORAL at 08:45

## 2022-01-06 RX ADMIN — FUROSEMIDE 20 MG: 10 INJECTION, SOLUTION INTRAMUSCULAR; INTRAVENOUS at 15:07

## 2022-01-06 RX ADMIN — DEXAMETHASONE SODIUM PHOSPHATE 6 MG: 10 INJECTION, SOLUTION INTRAMUSCULAR; INTRAVENOUS at 03:58

## 2022-01-06 RX ADMIN — ALBUTEROL SULFATE 2 PUFF: 90 AEROSOL, METERED RESPIRATORY (INHALATION) at 08:38

## 2022-01-06 RX ADMIN — ENOXAPARIN SODIUM 40 MG: 100 INJECTION SUBCUTANEOUS at 08:45

## 2022-01-06 RX ADMIN — CLONIDINE HYDROCHLORIDE 0.2 MG: 0.1 TABLET ORAL at 18:52

## 2022-01-06 RX ADMIN — FUROSEMIDE 20 MG: 10 INJECTION, SOLUTION INTRAMUSCULAR; INTRAVENOUS at 21:05

## 2022-01-06 RX ADMIN — FUROSEMIDE 20 MG: 10 INJECTION, SOLUTION INTRAMUSCULAR; INTRAVENOUS at 08:45

## 2022-01-06 RX ADMIN — SENNOSIDES AND DOCUSATE SODIUM 1 TABLET: 50; 8.6 TABLET ORAL at 08:46

## 2022-01-06 RX ADMIN — SENNOSIDES AND DOCUSATE SODIUM 1 TABLET: 50; 8.6 TABLET ORAL at 21:03

## 2022-01-06 RX ADMIN — GABAPENTIN 900 MG: 300 CAPSULE ORAL at 00:22

## 2022-01-06 RX ADMIN — BENZONATATE 100 MG: 100 CAPSULE ORAL at 08:43

## 2022-01-06 RX ADMIN — CLONIDINE HYDROCHLORIDE 0.2 MG: 0.1 TABLET ORAL at 00:21

## 2022-01-06 RX ADMIN — CLONIDINE HYDROCHLORIDE 0.2 MG: 0.1 TABLET ORAL at 06:07

## 2022-01-06 RX ADMIN — ESCITALOPRAM OXALATE 10 MG: 10 TABLET ORAL at 21:03

## 2022-01-06 RX ADMIN — REMDESIVIR 100 MG: 100 INJECTION, POWDER, LYOPHILIZED, FOR SOLUTION INTRAVENOUS at 21:03

## 2022-01-06 RX ADMIN — GABAPENTIN 900 MG: 300 CAPSULE ORAL at 15:07

## 2022-01-06 ASSESSMENT — ACTIVITIES OF DAILY LIVING (ADL)
ADLS_ACUITY_SCORE: 10
ADLS_ACUITY_SCORE: 12
ADLS_ACUITY_SCORE: 10
ADLS_ACUITY_SCORE: 10

## 2022-01-06 NOTE — PROGRESS NOTES
Care Management Follow Up    Length of Stay (days): 5    Expected Discharge Date: 01/08/2022     Concerns to be Addressed:       Patient plan of care discussed at interdisciplinary rounds: Yes    Anticipated Discharge Disposition:       Anticipated Discharge Services:    Anticipated Discharge DME:      Patient/family educated on Medicare website which has current facility and service quality ratings:    Education Provided on the Discharge Plan:    Patient/Family in Agreement with the Plan:      Referrals Placed by CM/SW:    Private pay costs discussed: Not applicable    Additional Information:  Discussed with pt by phone recommendation for TCU.  Pt is in agreement at this time.  Discussed that only TCU that accepts Covid + pts at this time [facility].  Pt agreed to referral [facility].      MEGHAN Hernandez

## 2022-01-06 NOTE — PLAN OF CARE
Problem: Gas Exchange Impaired  Goal: Optimal Gas Exchange  Outcome: No Change    Problem: Acute Neurologic Deterioration (Alcohol Withdrawal)  Goal: Optimal Neurologic Function  Outcome: No Change     Reports feeling as if not getting adequate breaths.  O2 sats measured in mid 90s on 1L nasal canula.  Desatting to high 80s with activity, recovering within one minute.  More ambulation today between chair and bed.  CIWA scores 5,3.  Calling frequently.  Able to express needs.

## 2022-01-06 NOTE — PLAN OF CARE
Pt vitally stable. Required a 1L O2 Nasal Cannula, sating between 92-94%. Denies pain. CIWA rating @ 4 overnight. L forearm IV. Stand by assist. Requesting bowl prep regimen this morning.

## 2022-01-06 NOTE — PROGRESS NOTES
Hospitalist Progress Note    Assessment/Plan    Active Problems:    Hyponatremia    Hypokalemia    Hypoxia    Alcoholic intoxication without complication (H)    Infection due to 2019 novel coronavirus      71-year-old patient with history of alcohol abuse presented to the hospital by ambulance for difficulty walking after drinking alcohol he always had shakiness that does not get worse with alcohol, he is was concerned about possible alcohol withdrawal, in the ER blood alcohol level is elevated, sodium level was 128,, he was found to be hypoxic and tested positive for COVID-19       1/6 :     Admitted 1/1    Respiratory status stable  Off oxgen  Continue dexamethasone, remdesivir, lovenox for DVT prevention    Seems fluid overloaded  BNP elevated, on  iv lasix 20 mg q 8    Scoring low on  CIWA now  no confusion/hallucination noted but significant tremors  Switched  from ativan to valium on CIWA, on gabapentin taper      Not medically ready for discharge at this time.  Needing iv diuresis  Another 2 days ? - will need TCU        A/p :         acute respiratory failure with hypoxia  Secondary to COVID-19 infection, diagnosed with positive test in the ER  Chest x-ray did not show any infiltrate, requiring 1 to 2 L oxygen, started on remdesivir and dexamethasone,  -Monitor oxygen requirement and titrate.  Also  -Albuterol as needed for wheezing has been ordered  Started on Lovenox 40 daily may consider switching to twice daily based on his BMI-if platelet count continues to be stable  On dexamethasone, remdesivir.        Weakness acute alcohol intoxication, concerning for alcohol withdrawal  Patient was having difficulty walking after he drank too much, he does have shakiness at baseline, started on CIWA protocol and receiving Ativan with good effect,  -We will continue to monitor it closely, ordered thiamine and multivitamin,  - on escitalopram and holding trazodone for seizures, holding home gabapentin but on  gabapentin taper per alcohol w/d protocol.        Acute hyponatremia   secondary to dehydration and alcoholism, resolved with iv hydration        Hypertension currently hypotensive  Holding lisinopril for now, metoprolol with holding parameters        Hypokalemia : supplement prn    Barriers to Discharge: iv diuresis, placement    Anticipated discharge date/Disposition: 2 days?        Objective    Vital signs in last 24 hours  Temp:  [97.7  F (36.5  C)-98.7  F (37.1  C)] 98.3  F (36.8  C)  Pulse:  [50-84] 62  Resp:  [16-20] 20  BP: (123-174)/(59-82) 123/59  SpO2:  [92 %-95 %] 93 % [unfilled] O2 Device: Nasal cannula    Weight:   [unfilled] Weight change:     Intake/Output last 3 shifts  I/O last 3 completed shifts:  In: 1230 [P.O.:1230]  Out: 1350 [Urine:1350]  Body mass index is 40.98 kg/m .    Physical Exam:  General Appearance: Alert, oriented x3, tremulous  HEENT: Normocephalic. No scleral icterus, . Mucous membranes moist.  Heart: :Regular rate and rhythm, normal S1 ,S2, No murmurs, no JVD, no pedal edema   Lungs: Diminished breath sounds n bilaterally mild wheezing but no crackles  Abdomen: Soft, non tender, no rebound or rigidity, non distended, bowel sounds present.  Extremity: No deformity. No joint swelling.  Neurologic: Alert, Oriented x3, speech is intact, Muscle Power 3/5 of bilateral upper and lower extremities      Pertinent Labs   Lab Results: personally reviewed.   Recent Labs   Lab 01/06/22  0618 01/05/22  0918 01/04/22  0636 01/02/22  0714 01/01/22  0334 12/31/21  2046    135* 138   < > 131* 128*   CO2 27 24 27   < > 25 21*   BUN 14 13 14   < > 9 12   ALBUMIN  --   --   --   --  3.4* 3.6   ALKPHOS  --   --   --   --  52 57   ALT  --   --   --   --  29 26   AST  --   --   --   --  37 33    < > = values in this interval not displayed.     Recent Labs   Lab 01/06/22  0618 01/05/22  0918 01/04/22  0636   WBC 6.7 5.8 5.1   HGB 14.0 14.2 14.5   HCT 41.5 42.2 43.1   * 130* 132*     Recent  Labs   Lab 01/01/22  0334   TROPONINI 0.04     Invalid input(s): POCGLUFGR    Medications  Drug and lactation database from the [place] [employer] Medicine:  http://toxnet.nlm.nih.gov/cgi-bin/sis/htmlgen?LACT          Advanced Care Planning:   Discharge Planning discussed with patient  Total time with this patient is 35 min with 50% of time spent in examining the patient, reviewing records, discussing plan of care and counseling, 50% of time spent in coordination of care.  Care discussed and coordinated with RN.

## 2022-01-07 ENCOUNTER — APPOINTMENT (OUTPATIENT)
Dept: PHYSICAL THERAPY | Facility: CLINIC | Age: 72
DRG: 896 | End: 2022-01-07
Payer: COMMERCIAL

## 2022-01-07 LAB
ANION GAP SERPL CALCULATED.3IONS-SCNC: 8 MMOL/L (ref 5–18)
BUN SERPL-MCNC: 15 MG/DL (ref 8–28)
CALCIUM SERPL-MCNC: 8.4 MG/DL (ref 8.5–10.5)
CHLORIDE BLD-SCNC: 98 MMOL/L (ref 98–107)
CO2 SERPL-SCNC: 25 MMOL/L (ref 22–31)
CREAT SERPL-MCNC: 0.82 MG/DL (ref 0.7–1.3)
GFR SERPL CREATININE-BSD FRML MDRD: >90 ML/MIN/1.73M2
GLUCOSE BLD-MCNC: 139 MG/DL (ref 70–125)
MAGNESIUM SERPL-MCNC: 1.9 MG/DL (ref 1.8–2.6)
POTASSIUM BLD-SCNC: 3.8 MMOL/L (ref 3.5–5)
SODIUM SERPL-SCNC: 131 MMOL/L (ref 136–145)

## 2022-01-07 PROCEDURE — 120N000001 HC R&B MED SURG/OB

## 2022-01-07 PROCEDURE — 250N000013 HC RX MED GY IP 250 OP 250 PS 637: Performed by: HOSPITALIST

## 2022-01-07 PROCEDURE — 97530 THERAPEUTIC ACTIVITIES: CPT | Mod: GP

## 2022-01-07 PROCEDURE — 250N000011 HC RX IP 250 OP 636: Performed by: INTERNAL MEDICINE

## 2022-01-07 PROCEDURE — 250N000011 HC RX IP 250 OP 636: Performed by: HOSPITALIST

## 2022-01-07 PROCEDURE — 250N000013 HC RX MED GY IP 250 OP 250 PS 637: Performed by: INTERNAL MEDICINE

## 2022-01-07 PROCEDURE — 82310 ASSAY OF CALCIUM: CPT | Performed by: INTERNAL MEDICINE

## 2022-01-07 PROCEDURE — 99233 SBSQ HOSP IP/OBS HIGH 50: CPT | Performed by: INTERNAL MEDICINE

## 2022-01-07 PROCEDURE — 83735 ASSAY OF MAGNESIUM: CPT | Performed by: INTERNAL MEDICINE

## 2022-01-07 PROCEDURE — 97116 GAIT TRAINING THERAPY: CPT | Mod: GP

## 2022-01-07 PROCEDURE — 36415 COLL VENOUS BLD VENIPUNCTURE: CPT | Performed by: INTERNAL MEDICINE

## 2022-01-07 RX ORDER — MAGNESIUM CARB/ALUMINUM HYDROX 105-160MG
296 TABLET,CHEWABLE ORAL ONCE
Status: COMPLETED | OUTPATIENT
Start: 2022-01-07 | End: 2022-01-07

## 2022-01-07 RX ORDER — AMOXICILLIN 250 MG
2 CAPSULE ORAL 2 TIMES DAILY
Status: DISCONTINUED | OUTPATIENT
Start: 2022-01-07 | End: 2022-01-19 | Stop reason: HOSPADM

## 2022-01-07 RX ORDER — CLONIDINE HYDROCHLORIDE 0.1 MG/1
0.2 TABLET ORAL 2 TIMES DAILY
Status: DISCONTINUED | OUTPATIENT
Start: 2022-01-07 | End: 2022-01-08

## 2022-01-07 RX ORDER — POLYETHYLENE GLYCOL 3350 17 G/17G
17 POWDER, FOR SOLUTION ORAL 2 TIMES DAILY
Status: DISCONTINUED | OUTPATIENT
Start: 2022-01-07 | End: 2022-01-19 | Stop reason: HOSPADM

## 2022-01-07 RX ADMIN — CLONIDINE HYDROCHLORIDE 0.2 MG: 0.1 TABLET ORAL at 00:56

## 2022-01-07 RX ADMIN — METOPROLOL TARTRATE 25 MG: 25 TABLET, FILM COATED ORAL at 09:12

## 2022-01-07 RX ADMIN — DIAZEPAM 5 MG: 5 TABLET ORAL at 14:11

## 2022-01-07 RX ADMIN — POLYETHYLENE GLYCOL 3350 17 G: 17 POWDER, FOR SOLUTION ORAL at 09:11

## 2022-01-07 RX ADMIN — CLONIDINE HYDROCHLORIDE 0.2 MG: 0.1 TABLET ORAL at 21:02

## 2022-01-07 RX ADMIN — FUROSEMIDE 20 MG: 10 INJECTION, SOLUTION INTRAMUSCULAR; INTRAVENOUS at 03:20

## 2022-01-07 RX ADMIN — ENOXAPARIN SODIUM 40 MG: 100 INJECTION SUBCUTANEOUS at 09:13

## 2022-01-07 RX ADMIN — GABAPENTIN 600 MG: 300 CAPSULE ORAL at 00:55

## 2022-01-07 RX ADMIN — ALBUTEROL SULFATE 2 PUFF: 90 AEROSOL, METERED RESPIRATORY (INHALATION) at 21:13

## 2022-01-07 RX ADMIN — ESCITALOPRAM OXALATE 10 MG: 10 TABLET ORAL at 21:03

## 2022-01-07 RX ADMIN — Medication 5 MG: at 21:03

## 2022-01-07 RX ADMIN — DIAZEPAM 5 MG: 5 TABLET ORAL at 21:03

## 2022-01-07 RX ADMIN — FUROSEMIDE 20 MG: 10 INJECTION, SOLUTION INTRAMUSCULAR; INTRAVENOUS at 14:11

## 2022-01-07 RX ADMIN — FUROSEMIDE 20 MG: 10 INJECTION, SOLUTION INTRAMUSCULAR; INTRAVENOUS at 09:12

## 2022-01-07 RX ADMIN — MAGNESIUM CITRATE 296 ML: 1.75 LIQUID ORAL at 11:01

## 2022-01-07 RX ADMIN — DEXAMETHASONE SODIUM PHOSPHATE 6 MG: 10 INJECTION, SOLUTION INTRAMUSCULAR; INTRAVENOUS at 03:20

## 2022-01-07 RX ADMIN — FOLIC ACID 1 MG: 1 TABLET ORAL at 09:12

## 2022-01-07 RX ADMIN — MULTIPLE VITAMINS W/ MINERALS TAB 1 TABLET: TAB at 09:12

## 2022-01-07 RX ADMIN — ALBUTEROL SULFATE 2 PUFF: 90 AEROSOL, METERED RESPIRATORY (INHALATION) at 00:57

## 2022-01-07 RX ADMIN — SENNOSIDES AND DOCUSATE SODIUM 1 TABLET: 50; 8.6 TABLET ORAL at 09:12

## 2022-01-07 RX ADMIN — GABAPENTIN 600 MG: 300 CAPSULE ORAL at 18:05

## 2022-01-07 RX ADMIN — FUROSEMIDE 20 MG: 10 INJECTION, SOLUTION INTRAMUSCULAR; INTRAVENOUS at 21:03

## 2022-01-07 RX ADMIN — CLONIDINE HYDROCHLORIDE 0.2 MG: 0.1 TABLET ORAL at 06:29

## 2022-01-07 RX ADMIN — SENNOSIDES AND DOCUSATE SODIUM 2 TABLET: 50; 8.6 TABLET ORAL at 21:02

## 2022-01-07 RX ADMIN — GABAPENTIN 600 MG: 300 CAPSULE ORAL at 09:12

## 2022-01-07 ASSESSMENT — ACTIVITIES OF DAILY LIVING (ADL)
ADLS_ACUITY_SCORE: 12
ADLS_ACUITY_SCORE: 10
ADLS_ACUITY_SCORE: 12
ADLS_ACUITY_SCORE: 10
ADLS_ACUITY_SCORE: 12
ADLS_ACUITY_SCORE: 12
ADLS_ACUITY_SCORE: 10
ADLS_ACUITY_SCORE: 12
ADLS_ACUITY_SCORE: 10
ADLS_ACUITY_SCORE: 12
ADLS_ACUITY_SCORE: 10
ADLS_ACUITY_SCORE: 10
ADLS_ACUITY_SCORE: 12
ADLS_ACUITY_SCORE: 10
ADLS_ACUITY_SCORE: 12
ADLS_ACUITY_SCORE: 12

## 2022-01-07 NOTE — PLAN OF CARE
Patient is on 1L NC. Patient desats into the 80's with movement but recovers quickly. Reports some shortness of breath. Wheezing noted throughout lungs. PRN Albuterol given with some relief noted per patient. Scoring 1-2 on CIWA. Alarms on for safety. Calls appropriately.

## 2022-01-07 NOTE — PROGRESS NOTES
Hospitalist Progress Note    Assessment/Plan    Active Problems:    Hyponatremia    Hypokalemia    Hypoxia    Alcoholic intoxication without complication (H)    Infection due to 2019 novel coronavirus      71-year-old patient with history of alcohol abuse presented to the hospital by ambulance for difficulty walking after drinking alcohol he always had shakiness that does not get worse with alcohol, he is was concerned about possible alcohol withdrawal, in the ER blood alcohol level is elevated, sodium level was 128,, he was found to be hypoxic and tested positive for COVID-19       1/7:  Breathing is poor.  Coughing some phlegm up.  Alert, oriented. Vitals stable.   On 1Lpm   CIWA scores have been low, around 1. Not requiring prn benzo's.   Has not had a BM since admission on 1/1        A/p :         acute respiratory failure with hypoxia/COVID breakthrough in fully vaccinated (not boosted) individual. Hx of PE Jan 2021, completed a/c with eliquis in 2021.   Secondary to COVID-19 infection, diagnosed with positive test in the ER  Chest x-ray did not show any infiltrate, requiring 1 to 2 L oxygen, started on remdesivir and dexamethasone,  -Monitor oxygen requirement and titrate.  Also  -Albuterol as needed for wheezing has been ordered  Started on Lovenox 40 daily may consider switching to twice daily based on his BMI-if platelet count continues to be stable  On dexamethasone for 10 days  Completed remdesivir (5 days)        Weakness acute alcohol intoxication, concerning for alcohol withdrawal. Also hx of depression/anxiety.  Patient was having difficulty walking after he drank too much, he does have shakiness at baseline, started on CIWA protocol and receiving Ativan with good effect,  -We will continue to monitor it closely, ordered thiamine and multivitamin,  - stop CIWA for now  - valium 5mg tid for minor withdrawal symptoms, taper off over a few days  - adjunctive tx with gabapentin taper has been ordered  -  continue adjunctive tx with clonidine 0.2mg but change to bid.   - on escitalopram and holding trazodone for seizures, holding home gabapentin but on gabapentin taper per alcohol w/d protocol.  - continue home lexapro 10mg daily        Acute hyponatremia. Mild, Na 131 today.   secondary to dehydration and alcoholism, resolved with iv hydration  Encourage po intake  Repeat chem panel in AM.         Hypertension  Continue lopressor 25mg bid, clonidine as above.     Constipation:  - increase miralax to bid  - increase senna/docusate to 2 tabs bid  - add mag citrate solution today.  - may need enema vs. Suppository if no BM by later today.    Thrombocytopenia. Likely due to etoh use, BM suppression.  Mild, no need to trend plt count.         Hypokalemia : supplement prn    Barriers to Discharge: iv diuresis, placement    Anticipated discharge date/Disposition: 1-2 days        Objective    Vital signs in last 24 hours  Temp:  [97.5  F (36.4  C)-98.5  F (36.9  C)] 98.1  F (36.7  C)  Pulse:  [53-72] 55  Resp:  [18-24] 20  BP: (118-152)/(59-75) 118/59  SpO2:  [92 %-96 %] 93 % [unfilled] O2 Device: Nasal cannula    Weight:   [unfilled] Weight change:     Intake/Output last 3 shifts  I/O last 3 completed shifts:  In: 360 [P.O.:360]  Out: 2120 [Urine:2120]  Body mass index is 40.98 kg/m .    Physical Exam:  General Appearance: Alert, oriented x3, tremulous. Obese.   HEENT: Normocephalic. No scleral icterus, . Mucous membranes moist.  Heart: :Regular rate and rhythm, normal S1 ,S2, No murmurs, no JVD, no pedal edema   Lungs: Diminished breath sounds n bilaterally mild wheezing but no crackles  Abdomen: Soft, non tender, no rebound or rigidity, non distended, bowel sounds present.  Extremity: No deformity. No joint swelling.  Neurologic: Alert, Oriented x3, speech is intact, Muscle Power 3/5 of bilateral upper and lower extremities      Pertinent Labs   Lab Results: personally reviewed.   Recent Labs   Lab 01/07/22  0611  01/06/22  0618 01/05/22  0918 01/02/22  0714 01/01/22  0334 12/31/21  2046   * 136 135*   < > 131* 128*   CO2 25 27 24   < > 25 21*   BUN 15 14 13   < > 9 12   ALBUMIN  --   --   --   --  3.4* 3.6   ALKPHOS  --   --   --   --  52 57   ALT  --   --   --   --  29 26   AST  --   --   --   --  37 33    < > = values in this interval not displayed.     Recent Labs   Lab 01/06/22 0618 01/05/22  0918 01/04/22  0636   WBC 6.7 5.8 5.1   HGB 14.0 14.2 14.5   HCT 41.5 42.2 43.1   * 130* 132*     Recent Labs   Lab 01/01/22 0334   TROPONINI 0.04     Invalid input(s): POCGLUFGR    Medications  Drug and lactation database from the [place] [employer] Medicine:  http://toxnet.nlm.nih.gov/cgi-bin/sis/htmlgen?LACT          Advanced Care Planning:   Discharge Planning discussed with patient  Total time with this patient is 35 min with 50% of time spent in examining the patient, reviewing records, discussing plan of care and counseling, 50% of time spent in coordination of care.  Care discussed and coordinated with RN.

## 2022-01-07 NOTE — PROGRESS NOTES
Care Management Follow Up    Length of Stay (days): 6    Expected Discharge Date: 01/10/2022     Concerns to be Addressed:       Patient plan of care discussed at interdisciplinary rounds: Yes    Anticipated Discharge Disposition: COVID TCU vs TCU       Additional Information:  COVID TCU referral currently pending. Pt is not medically ready to discharge at this time. Pending when the Pt's out of his COVID window Pt may be able to look for more TCUs. CM to continue to monitor as needed.       MEGHAN Waterman

## 2022-01-08 LAB
ANION GAP SERPL CALCULATED.3IONS-SCNC: 10 MMOL/L (ref 5–18)
BUN SERPL-MCNC: 19 MG/DL (ref 8–28)
CALCIUM SERPL-MCNC: 9.1 MG/DL (ref 8.5–10.5)
CHLORIDE BLD-SCNC: 98 MMOL/L (ref 98–107)
CO2 SERPL-SCNC: 27 MMOL/L (ref 22–31)
CREAT SERPL-MCNC: 0.89 MG/DL (ref 0.7–1.3)
GFR SERPL CREATININE-BSD FRML MDRD: >90 ML/MIN/1.73M2
GLUCOSE BLD-MCNC: 171 MG/DL (ref 70–125)
POTASSIUM BLD-SCNC: 4 MMOL/L (ref 3.5–5)
SODIUM SERPL-SCNC: 135 MMOL/L (ref 136–145)

## 2022-01-08 PROCEDURE — 250N000013 HC RX MED GY IP 250 OP 250 PS 637: Performed by: INTERNAL MEDICINE

## 2022-01-08 PROCEDURE — 80048 BASIC METABOLIC PNL TOTAL CA: CPT | Performed by: INTERNAL MEDICINE

## 2022-01-08 PROCEDURE — 250N000013 HC RX MED GY IP 250 OP 250 PS 637: Performed by: HOSPITALIST

## 2022-01-08 PROCEDURE — 36415 COLL VENOUS BLD VENIPUNCTURE: CPT | Performed by: INTERNAL MEDICINE

## 2022-01-08 PROCEDURE — 250N000011 HC RX IP 250 OP 636: Performed by: HOSPITALIST

## 2022-01-08 PROCEDURE — 99232 SBSQ HOSP IP/OBS MODERATE 35: CPT | Performed by: HOSPITALIST

## 2022-01-08 PROCEDURE — 250N000011 HC RX IP 250 OP 636: Performed by: INTERNAL MEDICINE

## 2022-01-08 PROCEDURE — 120N000001 HC R&B MED SURG/OB

## 2022-01-08 RX ORDER — CLONIDINE HYDROCHLORIDE 0.1 MG/1
0.1 TABLET ORAL 2 TIMES DAILY
Status: DISCONTINUED | OUTPATIENT
Start: 2022-01-08 | End: 2022-01-09

## 2022-01-08 RX ADMIN — DIAZEPAM 5 MG: 5 TABLET ORAL at 20:25

## 2022-01-08 RX ADMIN — METOPROLOL TARTRATE 25 MG: 25 TABLET, FILM COATED ORAL at 08:51

## 2022-01-08 RX ADMIN — GABAPENTIN 600 MG: 300 CAPSULE ORAL at 08:51

## 2022-01-08 RX ADMIN — FOLIC ACID 1 MG: 1 TABLET ORAL at 08:52

## 2022-01-08 RX ADMIN — FUROSEMIDE 20 MG: 10 INJECTION, SOLUTION INTRAMUSCULAR; INTRAVENOUS at 20:26

## 2022-01-08 RX ADMIN — DIAZEPAM 5 MG: 5 TABLET ORAL at 08:52

## 2022-01-08 RX ADMIN — ALBUTEROL SULFATE 2 PUFF: 90 AEROSOL, METERED RESPIRATORY (INHALATION) at 15:50

## 2022-01-08 RX ADMIN — SENNOSIDES AND DOCUSATE SODIUM 2 TABLET: 50; 8.6 TABLET ORAL at 20:25

## 2022-01-08 RX ADMIN — DIAZEPAM 5 MG: 5 TABLET ORAL at 14:03

## 2022-01-08 RX ADMIN — ESCITALOPRAM OXALATE 10 MG: 10 TABLET ORAL at 20:26

## 2022-01-08 RX ADMIN — CLONIDINE HYDROCHLORIDE 0.2 MG: 0.1 TABLET ORAL at 08:51

## 2022-01-08 RX ADMIN — MULTIPLE VITAMINS W/ MINERALS TAB 1 TABLET: TAB at 08:52

## 2022-01-08 RX ADMIN — DEXAMETHASONE SODIUM PHOSPHATE 6 MG: 10 INJECTION, SOLUTION INTRAMUSCULAR; INTRAVENOUS at 03:49

## 2022-01-08 RX ADMIN — GABAPENTIN 600 MG: 300 CAPSULE ORAL at 15:47

## 2022-01-08 RX ADMIN — Medication 5 MG: at 20:25

## 2022-01-08 RX ADMIN — GABAPENTIN 600 MG: 300 CAPSULE ORAL at 00:21

## 2022-01-08 RX ADMIN — SENNOSIDES AND DOCUSATE SODIUM 2 TABLET: 50; 8.6 TABLET ORAL at 08:52

## 2022-01-08 RX ADMIN — ENOXAPARIN SODIUM 40 MG: 100 INJECTION SUBCUTANEOUS at 08:51

## 2022-01-08 RX ADMIN — FUROSEMIDE 20 MG: 10 INJECTION, SOLUTION INTRAMUSCULAR; INTRAVENOUS at 08:50

## 2022-01-08 RX ADMIN — CLONIDINE HYDROCHLORIDE 0.1 MG: 0.1 TABLET ORAL at 20:26

## 2022-01-08 RX ADMIN — FUROSEMIDE 20 MG: 10 INJECTION, SOLUTION INTRAMUSCULAR; INTRAVENOUS at 03:50

## 2022-01-08 RX ADMIN — FUROSEMIDE 20 MG: 10 INJECTION, SOLUTION INTRAMUSCULAR; INTRAVENOUS at 14:03

## 2022-01-08 RX ADMIN — POLYETHYLENE GLYCOL 3350 17 G: 17 POWDER, FOR SOLUTION ORAL at 08:51

## 2022-01-08 RX ADMIN — ALBUTEROL SULFATE 2 PUFF: 90 AEROSOL, METERED RESPIRATORY (INHALATION) at 04:03

## 2022-01-08 ASSESSMENT — ACTIVITIES OF DAILY LIVING (ADL)
ADLS_ACUITY_SCORE: 12
ADLS_ACUITY_SCORE: 12
ADLS_ACUITY_SCORE: 10
ADLS_ACUITY_SCORE: 12
ADLS_ACUITY_SCORE: 10
ADLS_ACUITY_SCORE: 10
ADLS_ACUITY_SCORE: 12
ADLS_ACUITY_SCORE: 10
ADLS_ACUITY_SCORE: 12

## 2022-01-08 NOTE — PLAN OF CARE
Problem: Adult Inpatient Plan of Care  Goal: Absence of Hospital-Acquired Illness or Injury  Intervention: Identify and Manage Fall Risk  Recent Flowsheet Documentation  Taken 1/7/2022 0900 by Hillary Coker RN  Safety Promotion/Fall Prevention: activity supervised   Patient vitals stable. Patient was able to be on room air for most of the day. Went back on 1 L around 1600. Patient was given magnesium citrate at 1100 for constipation. Pt had 3 large Bm's this afternoon. Patients CIWA was discontinued.

## 2022-01-08 NOTE — PLAN OF CARE
A&Ox4. Up SBA, repositions self in bed. Urinal voiding, IV lasix per orders. Denies pain but says neuropathy to feet is uncomfortable, given scheduled gabapentin. Inspir wheezing on auscultation, given PRN inhaler x 1. Productive cough. On 1.5 LPM O2 NC,  in place. HR has been jarocho. Airborne iso remains in place. CIWA scored 3, tremor at normal baseline, given scheduled valium. K+ prot, recheck in AM. Plans for TCU when covid bed available or placement elsewhere post isolation. Hourly rounding completed, continuing to monitor.    Blood pressure 132/67, pulse (!) 48, temperature 97.3  F (36.3  C), temperature source Oral, resp. rate 20, weight 144.8 kg (319 lb 3.2 oz), SpO2 96 %.    Sil Hanson RN    Problem: Gas Exchange Impaired  Goal: Optimal Gas Exchange  Outcome: Improving

## 2022-01-08 NOTE — PLAN OF CARE
Problem: Gas Exchange Impaired  Goal: Optimal Gas Exchange  Outcome: Improving     VSS on 1 L O2 NC. HR 55-57, held metoprolol per orders. Patient satting 92%. Desats with coughing and moving. Productive cough present. Expiratory wheezes noted, PRN albuterol inhaler given. Denies pain. CIWA discontinued. Mild tremors in hands noted.     Problem: Risk for Delirium  Goal: Improved Sleep  Outcome: Improving   Melatonin ordered and given.

## 2022-01-08 NOTE — PROGRESS NOTES
Hospitalist Progress Note    Assessment/Plan    Active Problems:    Hyponatremia    Hypokalemia    Hypoxia    Alcoholic intoxication without complication (H)    Infection due to 2019 novel coronavirus  71-year-old patient with history of alcohol abuse, who presented to the hospital for concern of alcohol drawl he was found to have sodium level of 128 and respiratory failure and hypoxia secondary to COVID-19 infection    Acute respiratory failure with hypoxia  COVID-19 infection  Patient previously vaccinated,  Patient has a history of PE was on Eliquis for PE  Patient started to have symptoms on [place] day  Tested positive on January 1  Currently on 1 L oxygen nasal cannula with improvement of his symptoms  Wondering when he can be off quarantine  Awaiting placement in TCU but very limited bedside and TCU for COVID patients      Alcohol intoxication concerning for withdrawal  Patient presented to the hospital concerning for alcohol withdrawal, he was started on CIWA protocol, currently on scheduled Ativan, on gabapentin taper, appears to be resolving,        Acute hyponatremia  Sodium level was very low on admission improved with IV hydration likely secondary to dehydration hypovolemia on admission    Thrombocytopenia  Likely secondary to alcohol intake, and bone marrow suppression, platelet count has been stable      Constipation  Resolved after stool softener has been given      Hypertension  Blood pressure is controlled with Lopressor and clonidine  Barriers to Discharge: COVID-19 excepting facility bed availability    Anticipated discharge date/Disposition: TBD    Subjective  Patient was seen today, he states his symptoms are much better, he is happy with the progress he made  but he still have occasional shortness of breath    Objective    Vital signs in last 24 hours  Temp:  [97.9  F (36.6  C)-98.2  F (36.8  C)] 98.2  F (36.8  C)  Pulse:  [50-67] 50  Resp:  [16-20] 20  BP: (103-151)/(58-77)  103/58  SpO2:  [92 %-95 %] 92 % [unfilled] O2 Device: Nasal cannula    Weight:   [unfilled] Weight change:     Intake/Output last 3 shifts  I/O last 3 completed shifts:  In: 480 [P.O.:480]  Out: 2350 [Urine:2350]  Body mass index is 40.98 kg/m .    Physical Exam:  General Appearance: Alert, oriented x3, does not appear in distress.  HEENT: Normocephalic. No scleral icterus, . Mucous membranes moist.  Heart: :Regular rate and rhythm, normal S1 ,S2, No murmurs, no JVD, no  pedal edema   Lungs: Clear to auscultation bilaterally. No wheezing or crackles  Abdomen: Soft, non tender, no rebound or rigidity, non distended, bowel sounds present.  Extremity: No deformity. No joint swelling.    Pertinent Labs   Lab Results: personally reviewed.   Recent Labs   Lab 01/08/22  0903 01/07/22  0611 01/06/22  0618   * 131* 136   CO2 27 25 27   BUN 19 15 14     Recent Labs   Lab 01/06/22  0618 01/05/22  0918 01/04/22  0636   WBC 6.7 5.8 5.1   HGB 14.0 14.2 14.5   HCT 41.5 42.2 43.1   * 130* 132*     No results for input(s): CKTOTAL, TROPONINI in the last 168 hours.    Invalid input(s): TROPONINT, CKMBINDEX  Invalid input(s): POCGLUFGR    Medications  Drug and lactation database from the [place] [employer] Medicine:  http://toxnet.nlm.nih.gov/cgi-bin/sis/htmlgen?LACT          Advanced Care Planning:  Discharge Planning discussed with patient  Total time with this patient is 25 min with 50% of time spent in examining the patient, reviewing records, discussing plan of care and counseling, 50% of time spent in coordination of care.  Care discussed and coordinated with ROWENA.      Paula Hernandez MD  Internal Medicine Hospitalist  1/8/2022

## 2022-01-08 NOTE — PLAN OF CARE
Problem: Gas Exchange Impaired  Goal: Optimal Gas Exchange  1/8/2022 0517 by Doris Paul, RN  Outcome: No Change     VSS on 1L O2 NC. O2 satting 94-95% when sleeping, and 90-92% when moving around in bed. Patient did not get OOB on this shift. Voiding adequately. Continues to have wheezes, PRN albuterol inhaler given. Experiencing productive cough with thick sputum. Denies pain.

## 2022-01-08 NOTE — PROGRESS NOTES
Care Management Follow Up    Length of Stay (days): 7    Expected Discharge Date: 01/10/2022     Concerns to be Addressed:       Patient plan of care discussed at interdisciplinary rounds: Yes    Anticipated Discharge Disposition:       Anticipated Discharge Services:    Anticipated Discharge DME:      Patient/family educated on Medicare website which has current facility and service quality ratings:    Education Provided on the Discharge Plan:    Patient/Family in Agreement with the Plan:      Referrals Placed by CM/SW:    Private pay costs discussed: Not applicable    Additional Information:  2:35 PM  SW left  for admissions at [facility] checking on status of referral/beds.      CARLINE Sharp

## 2022-01-09 ENCOUNTER — APPOINTMENT (OUTPATIENT)
Dept: OCCUPATIONAL THERAPY | Facility: CLINIC | Age: 72
DRG: 896 | End: 2022-01-09
Payer: COMMERCIAL

## 2022-01-09 ENCOUNTER — APPOINTMENT (OUTPATIENT)
Dept: PHYSICAL THERAPY | Facility: CLINIC | Age: 72
DRG: 896 | End: 2022-01-09
Payer: COMMERCIAL

## 2022-01-09 LAB
HOLD SPECIMEN: NORMAL
POTASSIUM BLD-SCNC: 3.8 MMOL/L (ref 3.5–5)

## 2022-01-09 PROCEDURE — 84132 ASSAY OF SERUM POTASSIUM: CPT | Performed by: HOSPITALIST

## 2022-01-09 PROCEDURE — 250N000013 HC RX MED GY IP 250 OP 250 PS 637: Performed by: HOSPITALIST

## 2022-01-09 PROCEDURE — 250N000011 HC RX IP 250 OP 636: Performed by: INTERNAL MEDICINE

## 2022-01-09 PROCEDURE — 99232 SBSQ HOSP IP/OBS MODERATE 35: CPT | Performed by: HOSPITALIST

## 2022-01-09 PROCEDURE — 87205 SMEAR GRAM STAIN: CPT | Performed by: HOSPITALIST

## 2022-01-09 PROCEDURE — 120N000001 HC R&B MED SURG/OB

## 2022-01-09 PROCEDURE — 36415 COLL VENOUS BLD VENIPUNCTURE: CPT | Performed by: HOSPITALIST

## 2022-01-09 PROCEDURE — 250N000011 HC RX IP 250 OP 636: Performed by: HOSPITALIST

## 2022-01-09 PROCEDURE — 250N000013 HC RX MED GY IP 250 OP 250 PS 637: Performed by: INTERNAL MEDICINE

## 2022-01-09 PROCEDURE — 97535 SELF CARE MNGMENT TRAINING: CPT | Mod: GO

## 2022-01-09 PROCEDURE — 97110 THERAPEUTIC EXERCISES: CPT | Mod: GP

## 2022-01-09 RX ORDER — GUAIFENESIN/DEXTROMETHORPHAN 100-10MG/5
10 SYRUP ORAL EVERY 4 HOURS PRN
Status: DISCONTINUED | OUTPATIENT
Start: 2022-01-09 | End: 2022-01-19 | Stop reason: HOSPADM

## 2022-01-09 RX ORDER — DIAZEPAM 2 MG
2 TABLET ORAL EVERY 12 HOURS PRN
Status: DISCONTINUED | OUTPATIENT
Start: 2022-01-09 | End: 2022-01-19 | Stop reason: HOSPADM

## 2022-01-09 RX ORDER — ALBUTEROL SULFATE 90 UG/1
2 AEROSOL, METERED RESPIRATORY (INHALATION)
Status: DISCONTINUED | OUTPATIENT
Start: 2022-01-09 | End: 2022-01-19 | Stop reason: HOSPADM

## 2022-01-09 RX ORDER — CLONIDINE HYDROCHLORIDE 0.1 MG/1
0.1 TABLET ORAL DAILY
Status: COMPLETED | OUTPATIENT
Start: 2022-01-09 | End: 2022-01-10

## 2022-01-09 RX ORDER — LISINOPRIL 2.5 MG/1
2.5 TABLET ORAL DAILY
Status: DISCONTINUED | OUTPATIENT
Start: 2022-01-09 | End: 2022-01-11

## 2022-01-09 RX ADMIN — LISINOPRIL 2.5 MG: 2.5 TABLET ORAL at 08:53

## 2022-01-09 RX ADMIN — MULTIPLE VITAMINS W/ MINERALS TAB 1 TABLET: TAB at 08:53

## 2022-01-09 RX ADMIN — POLYETHYLENE GLYCOL 3350 17 G: 17 POWDER, FOR SOLUTION ORAL at 08:51

## 2022-01-09 RX ADMIN — ALBUTEROL SULFATE 2 PUFF: 90 AEROSOL, METERED RESPIRATORY (INHALATION) at 08:49

## 2022-01-09 RX ADMIN — CLONIDINE HYDROCHLORIDE 0.1 MG: 0.1 TABLET ORAL at 08:53

## 2022-01-09 RX ADMIN — GABAPENTIN 300 MG: 300 CAPSULE ORAL at 15:00

## 2022-01-09 RX ADMIN — GABAPENTIN 300 MG: 300 CAPSULE ORAL at 00:54

## 2022-01-09 RX ADMIN — ENOXAPARIN SODIUM 40 MG: 100 INJECTION SUBCUTANEOUS at 08:54

## 2022-01-09 RX ADMIN — DEXAMETHASONE SODIUM PHOSPHATE 6 MG: 10 INJECTION, SOLUTION INTRAMUSCULAR; INTRAVENOUS at 03:30

## 2022-01-09 RX ADMIN — ALBUTEROL SULFATE 2 PUFF: 90 AEROSOL, METERED RESPIRATORY (INHALATION) at 22:18

## 2022-01-09 RX ADMIN — Medication 5 MG: at 22:21

## 2022-01-09 RX ADMIN — FUROSEMIDE 20 MG: 10 INJECTION, SOLUTION INTRAMUSCULAR; INTRAVENOUS at 03:30

## 2022-01-09 RX ADMIN — GABAPENTIN 300 MG: 300 CAPSULE ORAL at 23:26

## 2022-01-09 RX ADMIN — ESCITALOPRAM OXALATE 10 MG: 10 TABLET ORAL at 22:22

## 2022-01-09 RX ADMIN — SENNOSIDES AND DOCUSATE SODIUM 2 TABLET: 50; 8.6 TABLET ORAL at 22:22

## 2022-01-09 RX ADMIN — ALBUTEROL SULFATE 2 PUFF: 90 AEROSOL, METERED RESPIRATORY (INHALATION) at 15:01

## 2022-01-09 RX ADMIN — POLYETHYLENE GLYCOL 3350 17 G: 17 POWDER, FOR SOLUTION ORAL at 22:23

## 2022-01-09 RX ADMIN — SENNOSIDES AND DOCUSATE SODIUM 2 TABLET: 50; 8.6 TABLET ORAL at 08:54

## 2022-01-09 RX ADMIN — GABAPENTIN 300 MG: 300 CAPSULE ORAL at 08:53

## 2022-01-09 RX ADMIN — FOLIC ACID 1 MG: 1 TABLET ORAL at 08:53

## 2022-01-09 ASSESSMENT — ACTIVITIES OF DAILY LIVING (ADL)
ADLS_ACUITY_SCORE: 10

## 2022-01-09 NOTE — PLAN OF CARE
Problem: Gas Exchange Impaired  Goal: Optimal Gas Exchange  1/9/2022 0526 by Doris Paul RN  Outcome: No Change     Attempted to wean patient from O2. O2 satting >97% on 1L O2 NC. Weaned to 0.5L, and patient tolerated well. O2 satting 94-95%. Weaned to Room air and patient desatted into mid 80's with coughing fit. Titrated up to 1L O2 to get O2 90-92%. Will continue to monitor.     HR bradycardic overnight in mid to upper 40's while sleeping. Increases to 50's and 60's when awake. Denies pain. Plan to discharge to TCU pending COVID bed availability and status.

## 2022-01-09 NOTE — PLAN OF CARE
Problem: Gas Exchange Impaired  Goal: Optimal Gas Exchange  Outcome: No Change     O2 satting 94-96% on 1 L O2 NC. HR bradycardic, dropping into low 40's at times. Productive cough continued. Denies pain. I/S encouraged.

## 2022-01-09 NOTE — PROGRESS NOTES
"Hospitalist Progress Note    Assessment/Plan    Active Problems:    Hyponatremia    Hypokalemia    Hypoxia    Alcoholic intoxication without complication (H)    Infection due to 2019 novel coronavirus    71-year-old patient with history of alcohol abuse, who presented to the hospital for concern of alcohol drawl he was found to have sodium level of 128 and respiratory failure and hypoxia secondary to COVID-19 infection     Acute respiratory failure with hypoxia  COVID-19 infection  Patient previously vaccinated,  Patient has a history of PE was on Eliquis for PE  Patient started to have symptoms on  [place] day  Tested positive on January 1  Currently on 1 L oxygen nasal cannula  Still having cough and shortness of breath  Awaiting placement in TCU but very limited bedside and TCU for COVID patients  Will order sputum studies consider starting a Zithromax, cough suppressant ordered, albuterol ordered for shortness of breath     Alcohol intoxication concerning for withdrawal  Patient presented to the hospital concerning for alcohol withdrawal, he was started on CIWA protocol, currently on scheduled Ativan, on gabapentin taper, appears to be resolving,  Changed to Ativan as needed-           Acute hyponatremia  Sodium level was very low on admission improved with IV hydration likely secondary to dehydration hypovolemia on admission     Thrombocytopenia  Likely secondary to alcohol intake, and bone marrow suppression, platelet count has been stable  Bradycardia  Patient was noted to be bradycardic, EKG ordered currently pending, I held his beta-blocker and lowered the dose of clonidine      Constipation  Resolved after stool softener has been given        Hypertension  Patient is a clonidine beta-blocker and I lowered the dose of her clonidine-held beta-blocker due to bradycardia, so I ordered lisinopril for blood pressure control, will follow    Barriers to Discharge: Bed availability\" with accepting " TCU    Anticipated discharge date/Disposition: TCU in a few days    Subjective  Patient is still having episodes of cough and shortness of breath, had episode of bradycardia overnight,    Objective    Vital signs in last 24 hours  Temp:  [97.3  F (36.3  C)-98.4  F (36.9  C)] 97.5  F (36.4  C)  Pulse:  [44-75] 75  Resp:  [16-22] 16  BP: (116-183)/(55-91) 138/75  SpO2:  [87 %-98 %] 94 % [unfilled] O2 Device: None (Room air)    Weight:   [unfilled] Weight change:     Intake/Output last 3 shifts  I/O last 3 completed shifts:  In: 900 [P.O.:900]  Out: 2250 [Urine:2250]  Body mass index is 40.98 kg/m .    Physical Exam:  General Appearance: Alert, oriented x3, does not appear in distress.  HEENT: Normocephalic. No scleral icterus, . Mucous membranes moist.  Heart: :Regular rate and rhythm, normal S1 ,S2, No murmurs, no JVD, no pedal edema   Lungs: diminished BS bilaterally. No wheezing or crackles  Abdomen: Soft, non tender, no rebound or rigidity, non distended, bowel sounds present.  Extremity: No deformity. No joint swelling.      Pertinent Labs   Lab Results: personally reviewed.   Recent Labs   Lab 01/08/22  0903 01/07/22  0611 01/06/22  0618   * 131* 136   CO2 27 25 27   BUN 19 15 14     Recent Labs   Lab 01/06/22  0618 01/05/22  0918 01/04/22  0636   WBC 6.7 5.8 5.1   HGB 14.0 14.2 14.5   HCT 41.5 42.2 43.1   * 130* 132*     No results for input(s): CKTOTAL, TROPONINI in the last 168 hours.    Invalid input(s): TROPONINT, CKMBINDEX  Invalid input(s): POCGLUFGR       Pertinent Radiology     EKG Results: pending     Advanced Care Planning:  Discharge Planning discussed with patient  Total time with this patient is 25 min with 50% of time spent in examining the patient, reviewing records, discussing plan of care and counseling, 50% of time spent in coordination of care.  Care discussed and coordinated with RN, infection prevention.      Paula Hernandez MD  Internal Medicine Hospitalist  1/9/2022

## 2022-01-10 ENCOUNTER — APPOINTMENT (OUTPATIENT)
Dept: OCCUPATIONAL THERAPY | Facility: CLINIC | Age: 72
DRG: 896 | End: 2022-01-10
Payer: COMMERCIAL

## 2022-01-10 ENCOUNTER — APPOINTMENT (OUTPATIENT)
Dept: PHYSICAL THERAPY | Facility: CLINIC | Age: 72
DRG: 896 | End: 2022-01-10
Payer: COMMERCIAL

## 2022-01-10 ENCOUNTER — APPOINTMENT (OUTPATIENT)
Dept: CARDIOLOGY | Facility: CLINIC | Age: 72
DRG: 896 | End: 2022-01-10
Attending: HOSPITALIST
Payer: COMMERCIAL

## 2022-01-10 LAB
LVEF ECHO: NORMAL
MAGNESIUM SERPL-MCNC: 2.4 MG/DL (ref 1.8–2.6)
POTASSIUM BLD-SCNC: 3.7 MMOL/L (ref 3.5–5)

## 2022-01-10 PROCEDURE — 250N000013 HC RX MED GY IP 250 OP 250 PS 637: Performed by: HOSPITALIST

## 2022-01-10 PROCEDURE — 99232 SBSQ HOSP IP/OBS MODERATE 35: CPT | Performed by: HOSPITALIST

## 2022-01-10 PROCEDURE — 97535 SELF CARE MNGMENT TRAINING: CPT | Mod: GO

## 2022-01-10 PROCEDURE — 250N000011 HC RX IP 250 OP 636: Performed by: HOSPITALIST

## 2022-01-10 PROCEDURE — 36415 COLL VENOUS BLD VENIPUNCTURE: CPT | Performed by: HOSPITALIST

## 2022-01-10 PROCEDURE — 93306 TTE W/DOPPLER COMPLETE: CPT | Mod: 26 | Performed by: INTERNAL MEDICINE

## 2022-01-10 PROCEDURE — 97116 GAIT TRAINING THERAPY: CPT | Mod: GP

## 2022-01-10 PROCEDURE — 255N000002 HC RX 255 OP 636: Performed by: HOSPITALIST

## 2022-01-10 PROCEDURE — 120N000001 HC R&B MED SURG/OB

## 2022-01-10 PROCEDURE — 250N000013 HC RX MED GY IP 250 OP 250 PS 637: Performed by: INTERNAL MEDICINE

## 2022-01-10 PROCEDURE — 83735 ASSAY OF MAGNESIUM: CPT | Performed by: HOSPITALIST

## 2022-01-10 PROCEDURE — 84132 ASSAY OF SERUM POTASSIUM: CPT | Performed by: HOSPITALIST

## 2022-01-10 RX ADMIN — FOLIC ACID 1 MG: 1 TABLET ORAL at 09:05

## 2022-01-10 RX ADMIN — SENNOSIDES AND DOCUSATE SODIUM 2 TABLET: 50; 8.6 TABLET ORAL at 09:05

## 2022-01-10 RX ADMIN — CLONIDINE HYDROCHLORIDE 0.1 MG: 0.1 TABLET ORAL at 09:05

## 2022-01-10 RX ADMIN — ALBUTEROL SULFATE 2 PUFF: 90 AEROSOL, METERED RESPIRATORY (INHALATION) at 03:42

## 2022-01-10 RX ADMIN — ALBUTEROL SULFATE 2 PUFF: 90 AEROSOL, METERED RESPIRATORY (INHALATION) at 21:42

## 2022-01-10 RX ADMIN — ALBUTEROL SULFATE 2 PUFF: 90 AEROSOL, METERED RESPIRATORY (INHALATION) at 09:05

## 2022-01-10 RX ADMIN — Medication 5 MG: at 21:43

## 2022-01-10 RX ADMIN — MULTIPLE VITAMINS W/ MINERALS TAB 1 TABLET: TAB at 09:05

## 2022-01-10 RX ADMIN — GABAPENTIN 300 MG: 300 CAPSULE ORAL at 09:05

## 2022-01-10 RX ADMIN — ESCITALOPRAM OXALATE 10 MG: 10 TABLET ORAL at 21:42

## 2022-01-10 RX ADMIN — ENOXAPARIN SODIUM 40 MG: 100 INJECTION SUBCUTANEOUS at 09:05

## 2022-01-10 RX ADMIN — GABAPENTIN 300 MG: 300 CAPSULE ORAL at 18:31

## 2022-01-10 RX ADMIN — LISINOPRIL 2.5 MG: 2.5 TABLET ORAL at 09:05

## 2022-01-10 RX ADMIN — POLYETHYLENE GLYCOL 3350 17 G: 17 POWDER, FOR SOLUTION ORAL at 09:05

## 2022-01-10 RX ADMIN — ALBUTEROL SULFATE 2 PUFF: 90 AEROSOL, METERED RESPIRATORY (INHALATION) at 14:22

## 2022-01-10 RX ADMIN — DEXAMETHASONE SODIUM PHOSPHATE 6 MG: 10 INJECTION, SOLUTION INTRAMUSCULAR; INTRAVENOUS at 03:42

## 2022-01-10 RX ADMIN — PERFLUTREN 3 ML: 6.52 INJECTION, SUSPENSION INTRAVENOUS at 11:48

## 2022-01-10 ASSESSMENT — ACTIVITIES OF DAILY LIVING (ADL)
ADLS_ACUITY_SCORE: 10
ADLS_ACUITY_SCORE: 9
ADLS_ACUITY_SCORE: 10
ADLS_ACUITY_SCORE: 13
ADLS_ACUITY_SCORE: 13
ADLS_ACUITY_SCORE: 9
ADLS_ACUITY_SCORE: 10
ADLS_ACUITY_SCORE: 13
ADLS_ACUITY_SCORE: 10
ADLS_ACUITY_SCORE: 13
ADLS_ACUITY_SCORE: 9
ADLS_ACUITY_SCORE: 10
ADLS_ACUITY_SCORE: 9
ADLS_ACUITY_SCORE: 10
ADLS_ACUITY_SCORE: 10
ADLS_ACUITY_SCORE: 9
ADLS_ACUITY_SCORE: 13
ADLS_ACUITY_SCORE: 10
ADLS_ACUITY_SCORE: 10

## 2022-01-10 ASSESSMENT — MIFFLIN-ST. JEOR: SCORE: 2247.25

## 2022-01-10 NOTE — PLAN OF CARE
A&Ox4. Up SBA to commode, small BM x 1. Urinal voiding. C/o some upper abd gas pain, given schedule miralax and senna. Wheezing, given inhaler which did help to improve. 1 LPM O2 NC, , dyspnea with exertion. HR WNL majority of today. Airborne iso remains in place. Good appetite. Pleasant and cooperative. Hourly rounding completed, continuing to monitor.    Blood pressure 116/69, pulse 66, temperature 97.3  F (36.3  C), temperature source Oral, resp. rate 18, weight 144.8 kg (319 lb 3.2 oz), SpO2 92 %.    Sil Hanson RN     Problem: Adult Inpatient Plan of Care  Goal: Patient-Specific Goal (Individualized)  Outcome: Adequate for Discharge

## 2022-01-10 NOTE — PROGRESS NOTES
Hospitalist Progress Note    Assessment/Plan    Active Problems:    Hyponatremia    Hypokalemia    Hypoxia    Alcoholic intoxication without complication (H)    Infection due to 2019 novel coronavirus    71-year-old patient with history of alcohol abuse, who presented to the hospital for concern of alcohol drawl he was found to have sodium level of 128 and respiratory failure and hypoxia secondary to COVID-19 infection     Acute respiratory failure with hypoxia  COVID-19 infection  Patient previously vaccinated,  Patient has a history of PE was on Eliquis for PE  Patient started to have symptoms on  [place] day  Tested positive on January 1  Currently on 1 L oxygen nasal cannula was able to wean his oxygen down to room air at this time but he needs oxygen mostly at nighttime with sleep, possible underlying sleep apnea,  Still feels short of breath, coughing up some phlegm culture came back with normal stevie, scheduled albuterol is helping for shortness of breath,  Awaiting placement in TCU but very limited bedside and TCU for COVID patients       Alcohol intoxication concerning for withdrawal  Patient presented to the hospital concerning for alcohol withdrawal, he was started on CIWA protocol,  on gabapentin taper, along with Ativan as needed,  Improving        Acute hyponatremia resolved  Sodium level was very low on admission improved with IV hydration likely secondary to dehydration hypovolemia on admission sodium level improved to normal     Thrombocytopenia  Likely secondary to alcohol intake, and bone marrow suppression, platelet count has been stable  Bradycardia  Patient was noted to be bradycardic, EKG ordered currently pending, I held his beta-blocker and lowered the dose of clonidine  bradycardia is improving currently waiting on repeat echo     constipation  Resolved after stool softener has been given had a bowel movement last night        Hypertension  Patient is a clonidine beta-blocker and I  lowered the dose of her clonidine I also eld beta-blocker due to bradycardia with improvement of the bradycardia  I ordered lisinopril small dose but blood pressure still elevated so I will increase the lisinopril dose for better control    Barriers to Discharge: Placement,    Anticipated discharge date/Disposition: TCU on a few days    Subjective  Patient was seen this morning, he states he feels short of breath but overall better than when he first came in, he had a bowel movement yesterday which gave him some relief,    Objective    Vital signs in last 24 hours  Temp:  [97.3  F (36.3  C)-98.2  F (36.8  C)] 97.6  F (36.4  C)  Pulse:  [47-75] 75  Resp:  [14-18] 14  BP: (116-149)/(66-81) 149/81  SpO2:  [90 %-95 %] 91 % [unfilled] O2 Device: None (Room air)    Weight:   [unfilled] Weight change:     Intake/Output last 3 shifts  I/O last 3 completed shifts:  In: 480 [P.O.:480]  Out: 700 [Urine:700]  Body mass index is 41.69 kg/m .    Physical Exam:  General Appearance: Alert, oriented x3, does not appear in distress.  HEENT: Normocephalic. No scleral icterus, . Mucous membranes moist.  Heart: :Regular rate and rhythm, normal S1 ,S2, No murmurs, no JVD, nopedal edema   Lungs: Diminished breath sounds bilaterally.  Minimal wheezing but no crackles  Abdomen: Soft, non tender, no rebound or rigidity, non distended, bowel sounds present.  Extremity: No deformity. No joint swelling.      Pertinent Labs   Lab Results: personally reviewed.   Recent Labs   Lab 01/08/22  0903 01/07/22  0611 01/06/22  0618   * 131* 136   CO2 27 25 27   BUN 19 15 14     Recent Labs   Lab 01/06/22  0618 01/05/22  0918 01/04/22  0636   WBC 6.7 5.8 5.1   HGB 14.0 14.2 14.5   HCT 41.5 42.2 43.1   * 130* 132*     No results for input(s): CKTOTAL, TROPONINI in the last 168 hours.    Invalid input(s): TROPONINT, CKMBINDEX  Invalid input(s): POCGLUFGR    Medications  Drug and lactation database from the [employer]  Medicine:  http://toxnet.nlm.nih.gov/cgi-bin/sis/htmlgen?LACT          Advanced Care Planning:  Discharge Planning discussed with patient  Total time with this patient is 25 min with 50% of time spent in examining the patient, reviewing records, discussing plan of care and counseling, 50% of time spent in coordination of care.  Care discussed and coordinated with RN, care manager.      Paula Hernandez MD  Internal Medicine Hospitalist  1/10/2022

## 2022-01-10 NOTE — PLAN OF CARE
/66 (BP Location: Right arm)   Pulse (!) 47   Temp 97.6  F (36.4  C) (Oral)   Resp 15   Wt 144.8 kg (319 lb 3.2 oz)   SpO2 95%   BMI 40.98 kg/m        Problem: Adult Inpatient Plan of Care  Goal: Readiness for Transition of Care  Outcome: No Change     Problem: Gas Exchange Impaired  Goal: Optimal Gas Exchange  Outcome: No Change     Trae Geiger RN

## 2022-01-10 NOTE — PLAN OF CARE
Problem: Adult Inpatient Plan of Care  Goal: Plan of Care Review  Outcome: Improving  Flowsheets (Taken 1/10/2022 1139)  Plan of Care Reviewed With: patient  Progress: improving     Problem: Gas Exchange Impaired  Goal: Optimal Gas Exchange  Outcome: Improving     Problem: OT General Care Plan  Goal: Transfer (OT)  Description: Transfer (OT)  Outcome: Improving     Pt states he has some abdominal discomfort ever since he took magnesium citrate.  Pt has been stooling and had a small soft/loose smear in bed this morning.  Pt requesting BSC and was unsteady with the transfer, PT/OT this afternoon.  Sating 94% on RA while awake.  Productive cough continues.  Expiratory wheezes and started on scheduled albuterol.  Pt experiencing SOB with coughing and minimal activity.      Plan: anticipate need for TCU.

## 2022-01-10 NOTE — PROGRESS NOTES
Care Management Follow Up    Length of Stay (days): 9    Expected Discharge Date: 01/10/2022     Concerns to be Addressed: COVID and 02 progression.      Patient plan of care discussed at interdisciplinary rounds: Yes    Anticipated Discharge Disposition:  TCU    Anticipated Discharge Services:  TCU Anticipated Discharge DME:  02?     Patient/family educated on Medicare website which has current facility and service quality ratings:  Yes   Education Provided on the Discharge Plan: Yes   Patient/Family in Agreement with the Plan: Yes     Referrals Placed by CM/SW:  TCU  Private pay costs discussed: Yes     Additional Information:  [facility]Y left VM for [facility] Liaison  to check on be availability.       MEGHAN Ward

## 2022-01-11 ENCOUNTER — APPOINTMENT (OUTPATIENT)
Dept: PHYSICAL THERAPY | Facility: CLINIC | Age: 72
DRG: 896 | End: 2022-01-11
Payer: COMMERCIAL

## 2022-01-11 LAB
ATRIAL RATE - MUSE: 76 BPM
BACTERIA SPT CULT: NORMAL
DIASTOLIC BLOOD PRESSURE - MUSE: NORMAL MMHG
GRAM STAIN RESULT: NORMAL
HGB BLD-MCNC: 14.8 G/DL (ref 13.3–17.7)
HOLD SPECIMEN: NORMAL
INTERPRETATION ECG - MUSE: NORMAL
P AXIS - MUSE: 87 DEGREES
PLATELET # BLD AUTO: 163 10E3/UL (ref 150–450)
POTASSIUM BLD-SCNC: 3.8 MMOL/L (ref 3.5–5)
PR INTERVAL - MUSE: 182 MS
QRS DURATION - MUSE: 162 MS
QT - MUSE: 436 MS
QTC - MUSE: 490 MS
R AXIS - MUSE: -2 DEGREES
SYSTOLIC BLOOD PRESSURE - MUSE: NORMAL MMHG
T AXIS - MUSE: 8 DEGREES
VENTRICULAR RATE- MUSE: 76 BPM

## 2022-01-11 PROCEDURE — 99232 SBSQ HOSP IP/OBS MODERATE 35: CPT | Performed by: HOSPITALIST

## 2022-01-11 PROCEDURE — 97116 GAIT TRAINING THERAPY: CPT | Mod: GP

## 2022-01-11 PROCEDURE — 85018 HEMOGLOBIN: CPT | Performed by: HOSPITALIST

## 2022-01-11 PROCEDURE — 250N000013 HC RX MED GY IP 250 OP 250 PS 637: Performed by: INTERNAL MEDICINE

## 2022-01-11 PROCEDURE — 250N000013 HC RX MED GY IP 250 OP 250 PS 637: Performed by: HOSPITALIST

## 2022-01-11 PROCEDURE — 36415 COLL VENOUS BLD VENIPUNCTURE: CPT | Performed by: HOSPITALIST

## 2022-01-11 PROCEDURE — 93005 ELECTROCARDIOGRAM TRACING: CPT

## 2022-01-11 PROCEDURE — 120N000001 HC R&B MED SURG/OB

## 2022-01-11 PROCEDURE — 84132 ASSAY OF SERUM POTASSIUM: CPT | Performed by: HOSPITALIST

## 2022-01-11 PROCEDURE — 250N000011 HC RX IP 250 OP 636: Performed by: HOSPITALIST

## 2022-01-11 PROCEDURE — 93010 ELECTROCARDIOGRAM REPORT: CPT | Performed by: INTERNAL MEDICINE

## 2022-01-11 PROCEDURE — 85049 AUTOMATED PLATELET COUNT: CPT | Performed by: HOSPITALIST

## 2022-01-11 RX ORDER — METHYLPREDNISOLONE SODIUM SUCCINATE 40 MG/ML
40 INJECTION, POWDER, LYOPHILIZED, FOR SOLUTION INTRAMUSCULAR; INTRAVENOUS EVERY 6 HOURS
Status: COMPLETED | OUTPATIENT
Start: 2022-01-11 | End: 2022-01-12

## 2022-01-11 RX ORDER — LISINOPRIL 2.5 MG/1
2.5 TABLET ORAL DAILY
Status: DISCONTINUED | OUTPATIENT
Start: 2022-01-12 | End: 2022-01-18

## 2022-01-11 RX ORDER — LISINOPRIL 5 MG/1
5 TABLET ORAL DAILY
Status: DISCONTINUED | OUTPATIENT
Start: 2022-01-12 | End: 2022-01-11

## 2022-01-11 RX ORDER — AMLODIPINE BESYLATE 2.5 MG/1
2.5 TABLET ORAL DAILY
Status: DISCONTINUED | OUTPATIENT
Start: 2022-01-11 | End: 2022-01-18

## 2022-01-11 RX ADMIN — ENOXAPARIN SODIUM 40 MG: 100 INJECTION SUBCUTANEOUS at 08:54

## 2022-01-11 RX ADMIN — LISINOPRIL 2.5 MG: 2.5 TABLET ORAL at 08:55

## 2022-01-11 RX ADMIN — ALBUTEROL SULFATE 2 PUFF: 90 AEROSOL, METERED RESPIRATORY (INHALATION) at 20:01

## 2022-01-11 RX ADMIN — METHYLPREDNISOLONE SODIUM SUCCINATE 40 MG: 40 INJECTION, POWDER, FOR SOLUTION INTRAMUSCULAR; INTRAVENOUS at 18:07

## 2022-01-11 RX ADMIN — ALBUTEROL SULFATE 2 PUFF: 90 AEROSOL, METERED RESPIRATORY (INHALATION) at 08:54

## 2022-01-11 RX ADMIN — ESCITALOPRAM OXALATE 10 MG: 10 TABLET ORAL at 20:01

## 2022-01-11 RX ADMIN — GABAPENTIN 100 MG: 100 CAPSULE ORAL at 16:37

## 2022-01-11 RX ADMIN — METHYLPREDNISOLONE SODIUM SUCCINATE 40 MG: 40 INJECTION, POWDER, FOR SOLUTION INTRAMUSCULAR; INTRAVENOUS at 12:04

## 2022-01-11 RX ADMIN — MULTIPLE VITAMINS W/ MINERALS TAB 1 TABLET: TAB at 08:55

## 2022-01-11 RX ADMIN — Medication 5 MG: at 20:01

## 2022-01-11 RX ADMIN — FOLIC ACID 1 MG: 1 TABLET ORAL at 08:55

## 2022-01-11 RX ADMIN — DIAZEPAM 2 MG: 2 TABLET ORAL at 21:33

## 2022-01-11 RX ADMIN — METHYLPREDNISOLONE SODIUM SUCCINATE 40 MG: 40 INJECTION, POWDER, FOR SOLUTION INTRAMUSCULAR; INTRAVENOUS at 23:15

## 2022-01-11 RX ADMIN — AMLODIPINE BESYLATE 2.5 MG: 2.5 TABLET ORAL at 12:04

## 2022-01-11 RX ADMIN — ALBUTEROL SULFATE 2 PUFF: 90 AEROSOL, METERED RESPIRATORY (INHALATION) at 15:01

## 2022-01-11 RX ADMIN — ALBUTEROL SULFATE 2 PUFF: 90 AEROSOL, METERED RESPIRATORY (INHALATION) at 01:35

## 2022-01-11 RX ADMIN — GABAPENTIN 100 MG: 100 CAPSULE ORAL at 01:34

## 2022-01-11 RX ADMIN — GABAPENTIN 100 MG: 100 CAPSULE ORAL at 08:55

## 2022-01-11 RX ADMIN — Medication 5 MG: at 21:34

## 2022-01-11 RX ADMIN — GABAPENTIN 100 MG: 100 CAPSULE ORAL at 23:16

## 2022-01-11 RX ADMIN — BENZONATATE 100 MG: 100 CAPSULE ORAL at 21:34

## 2022-01-11 ASSESSMENT — ACTIVITIES OF DAILY LIVING (ADL)
ADLS_ACUITY_SCORE: 12
ADLS_ACUITY_SCORE: 10
ADLS_ACUITY_SCORE: 10
ADLS_ACUITY_SCORE: 12
ADLS_ACUITY_SCORE: 16
ADLS_ACUITY_SCORE: 12
ADLS_ACUITY_SCORE: 12
ADLS_ACUITY_SCORE: 10
ADLS_ACUITY_SCORE: 12
ADLS_ACUITY_SCORE: 10
ADLS_ACUITY_SCORE: 11
ADLS_ACUITY_SCORE: 13
ADLS_ACUITY_SCORE: 12
ADLS_ACUITY_SCORE: 10
ADLS_ACUITY_SCORE: 16
ADLS_ACUITY_SCORE: 10
ADLS_ACUITY_SCORE: 12
ADLS_ACUITY_SCORE: 13
ADLS_ACUITY_SCORE: 16
ADLS_ACUITY_SCORE: 10
ADLS_ACUITY_SCORE: 10
ADLS_ACUITY_SCORE: 16
ADLS_ACUITY_SCORE: 10
ADLS_ACUITY_SCORE: 12

## 2022-01-11 NOTE — PROGRESS NOTES
.Patient Name: Mesfin Lee   MRN: [id number]   Admit Date: 12/31/2021    Current Infection Status: Recovered COVID   Current Isolation Status: No active isolations     Review of COVID-19 status and required isolation precautions    Refer to Special Precautions Duration and Period of Transmissibility Guideline, in Policy Tech.        Involved parties: Marina Styles, Infection Prevention and Primary MD Dr. Hernandez.    Criteria used to make decision: Symptoms Dates: 12/25/2021 noted in chart on 1/9/22.    The involved parties have reviewed this patient's chart per the Special Precautions Duration and Period of Transmissibility guideline and have taken the following action:     DECISION - OPTION 1: Patient meets all the criteria for Special Precautions isolation discontinuation and this writer will resolve the COVID-19 infection flag and isolation status, due to: Immunocompetent Symptomatic: Mild or Moderate: 10 days since symptoms began AND greater than or equal to 24hrs since last fever (without fever-reducing meds) AND COVID-19 symptoms have substantially improved. .       Marina Styles, Infection Prevention

## 2022-01-11 NOTE — PLAN OF CARE
Problem: Adult Inpatient Plan of Care  Goal: Optimal Comfort and Wellbeing  Outcome: Improving     Alert and oriented x4. Able to make needs known via call light. 1 Liter of O2 while asleep, O2 sats dropped at times to high 80's.  Currently 92-94% on 1 Liter via N/C while asleep. Lung sounds dim with wheezes on expirations.

## 2022-01-11 NOTE — PLAN OF CARE
Problem: Adult Inpatient Plan of Care  Goal: Absence of Hospital-Acquired Illness or Injury  Intervention: Identify and Manage Fall Risk  Recent Flowsheet Documentation  Taken 1/10/2022 1755 by Luis M Segundo RN  Safety Promotion/Fall Prevention:    patient and family education    nonskid shoes/slippers when out of bed    mobility aid in reach    lighting adjusted    fall prevention program maintained    activity supervised     Problem: Gas Exchange Impaired  Goal: Optimal Gas Exchange  Outcome: Improving  Patient alert and oriented. Up sitting on recliner chair most of this shift. Voiding using urinals at bed side. VSS. LS diminished with expiratory wheezes. RA throughout this shift O2 Sat 96%. Patient Saline locked. Safety alarm in place. Will monitor and continue plan of care.

## 2022-01-11 NOTE — PROGRESS NOTES
Care Management Follow Up    Length of Stay (days): 10    Expected Discharge Date: 01/13/2021     Concerns to be Addressed:       Patient plan of care discussed at interdisciplinary rounds: Yes    Anticipated Discharge Disposition:  TCU      Additional Information:  CM spoke with provider, Charge RN, and infection prevention. Pt is now COVID recovered status. CM called and spoke with the Pt via phone as Pt was still on precautions. Pt states that he is open to going to any TCUs in the area understanding that beds are limited. Pt stated that [facility] a good experience and [facility]. Pt okay with referrals in the cities close to [place]. Referrals sent as requested.       MEGHAN Waterman

## 2022-01-11 NOTE — PLAN OF CARE
Problem: Adult Inpatient Plan of Care  Goal: Plan of Care Review  Outcome: Improving  Flowsheets (Taken 1/11/2022 1321)  Plan of Care Reviewed With: patient  Progress: improving     Pt has no pain complaints but appears more SOB today.  Started on methylprednisolone this afternoon.  Sating 96% on RA, continues to have a productive cough, and has expiratory wheezes throughout lung fields.  Pt states he feels very weak and would not get out of bed this morning.  Will work with PT/OT this afternoon.      Plan: continue supportive cares.

## 2022-01-11 NOTE — PROGRESS NOTES
Hospitalist Progress Note    Assessment/Plan    Active Problems:    Hyponatremia    Hypokalemia    Hypoxia    Alcoholic intoxication without complication (H)    Infection due to 2019 novel coronavirus       71-year-old patient with history of alcohol abuse, who presented to the hospital for concern of alcohol drawl he was found to have sodium level of 128 and respiratory failure and hypoxia secondary to COVID-19 infection now COVID recovered     Acute respiratory failure with hypoxia  COVID-19 infection now COVID recovered  Patient previously vaccinated,  Patient has a history of PE was on Eliquis for PE  Patient started to have symptoms on  [place] day  Tested positive on January 1  Currently on room air at this time but he needs oxygen mostly at nighttime with sleep, possible underlying sleep apnea, will need sleep study  Still feels short of breath, coughing up some phlegm culture came back with normal stevie, scheduled albuterol is helping for shortness of breath,  I also started him on short course of IV steroids to see if that will help with his wheezing  Echo came back with normal EF,          Alcohol intoxication concerning for withdrawal resolved  Patient presented to the hospital concerning for alcohol withdrawal, he was started on CIWA protocol,  on gabapentin taper, along with Ativan as needed,  Improving but he has an STD gait,        Acute hyponatremia resolved  Sodium level was very low on admission improved with IV hydration likely secondary to dehydration hypovolemia on admission sodium level improved to normal     Thrombocytopenia  Likely secondary to alcohol intake, and bone marrow suppression, platelet count has been stable    Bradycardia  Patient was noted to be bradycardic, EKG ordered came back with sinus rhythm right bundle branch block     I held his beta-blocker and lowered the dose of clonidine  bradycardia is improving  -Echo came back normal    constipation  Resolved after stool  softener has been given had a bowel movement last night        Hypertension  Patient is a clonidine beta-blocker and I lowered the dose of her clonidine I also eld beta-blocker due to bradycardia with improvement of the bradycardia  -Started amlodipine and lisinopril, will consider increasing the dose if blood pressure continues to be uncontrolled    Barriers to Discharge: TCU beds availability    Anticipated discharge date/Disposition: TCU in 1 to 2 days    Subjective  Patient was seen today, he is wheezing and that he does have remote history of smoking, he tells me that he was told he has COPD but that he was seen by another provider who told him he do not have it, overall feels better, he is also worried about unsteady gait and shakiness,    Objective    Vital signs in last 24 hours  Temp:  [97.5  F (36.4  C)-97.8  F (36.6  C)] 97.8  F (36.6  C)  Pulse:  [61-83] 75  Resp:  [16-18] 18  BP: (126-177)/(64-95) 165/82  SpO2:  [91 %-97 %] 95 % [unfilled] O2 Device: None (Room air)    Weight:   [unfilled] Weight change:     Intake/Output last 3 shifts  I/O last 3 completed shifts:  In: 740 [P.O.:740]  Out: 150 [Urine:150]  Body mass index is 41.69 kg/m .    Physical Exam:  General Appearance: Alert, oriented x3, does not appear in distress.  HEENT: Normocephalic. No scleral icterus, . Mucous membranes moist.  Heart: :Regular rate and rhythm, normal S1 ,S2, No murmurs, no JVD, no pedal edema   Lungs: Clear to auscultation bilaterally. Mild wheezing  No crackles  Abdomen: Soft, non tender, no rebound or rigidity, non distended, bowel sounds present.  Extremity: No deformity. No joint swelling.      Pertinent Labs   Lab Results: personally reviewed.   Recent Labs   Lab 01/08/22  0903 01/07/22  0611 01/06/22  0618   * 131* 136   CO2 27 25 27   BUN 19 15 14     Recent Labs   Lab 01/06/22  0618 01/05/22  0918   WBC 6.7 5.8   HGB 14.0 14.2   HCT 41.5 42.2   * 130*     No results for input(s): CKTOTAL, TROPONINI  in the last 168 hours.    Invalid input(s): TROPONINT, CKMBINDEX  Invalid input(s): POCGLUFGR    Medications  Drug and lactation database from the [place] [employer]:  http://toxnet.nlm.nih.gov/cgi-bin/sis/htmlgen?LACT      Pertinent Radiology     EKG Results: Sinus rhythm with right bundle branch block    Advanced Care Planning:  Discharge Planning discussed with patient  Total time with this patient is 25 min with 50% of time spent in examining the patient, reviewing records, discussing plan of care and counseling, 50% of time spent in coordination of care.  Care discussed and coordinated with RN, care manager.      Paula Hernandez MD  Internal Medicine Hospitalist  1/11/2022

## 2022-01-12 ENCOUNTER — APPOINTMENT (OUTPATIENT)
Dept: PHYSICAL THERAPY | Facility: CLINIC | Age: 72
DRG: 896 | End: 2022-01-12
Payer: COMMERCIAL

## 2022-01-12 ENCOUNTER — APPOINTMENT (OUTPATIENT)
Dept: OCCUPATIONAL THERAPY | Facility: CLINIC | Age: 72
DRG: 896 | End: 2022-01-12
Payer: COMMERCIAL

## 2022-01-12 ENCOUNTER — APPOINTMENT (OUTPATIENT)
Dept: RADIOLOGY | Facility: CLINIC | Age: 72
DRG: 896 | End: 2022-01-12
Attending: INTERNAL MEDICINE
Payer: COMMERCIAL

## 2022-01-12 LAB — POTASSIUM BLD-SCNC: 3.8 MMOL/L (ref 3.5–5)

## 2022-01-12 PROCEDURE — 250N000013 HC RX MED GY IP 250 OP 250 PS 637: Performed by: INTERNAL MEDICINE

## 2022-01-12 PROCEDURE — 99233 SBSQ HOSP IP/OBS HIGH 50: CPT | Performed by: INTERNAL MEDICINE

## 2022-01-12 PROCEDURE — 84132 ASSAY OF SERUM POTASSIUM: CPT | Performed by: HOSPITALIST

## 2022-01-12 PROCEDURE — 71046 X-RAY EXAM CHEST 2 VIEWS: CPT

## 2022-01-12 PROCEDURE — 250N000013 HC RX MED GY IP 250 OP 250 PS 637: Performed by: HOSPITALIST

## 2022-01-12 PROCEDURE — 250N000011 HC RX IP 250 OP 636: Performed by: HOSPITALIST

## 2022-01-12 PROCEDURE — 36415 COLL VENOUS BLD VENIPUNCTURE: CPT | Performed by: HOSPITALIST

## 2022-01-12 PROCEDURE — 250N000011 HC RX IP 250 OP 636: Performed by: INTERNAL MEDICINE

## 2022-01-12 PROCEDURE — 120N000001 HC R&B MED SURG/OB

## 2022-01-12 PROCEDURE — 97535 SELF CARE MNGMENT TRAINING: CPT | Mod: GO

## 2022-01-12 PROCEDURE — 97116 GAIT TRAINING THERAPY: CPT | Mod: GP

## 2022-01-12 RX ADMIN — ENOXAPARIN SODIUM 40 MG: 100 INJECTION SUBCUTANEOUS at 09:06

## 2022-01-12 RX ADMIN — ALBUTEROL SULFATE 2 PUFF: 90 AEROSOL, METERED RESPIRATORY (INHALATION) at 09:06

## 2022-01-12 RX ADMIN — ALBUTEROL SULFATE 2 PUFF: 90 AEROSOL, METERED RESPIRATORY (INHALATION) at 01:59

## 2022-01-12 RX ADMIN — METHYLPREDNISOLONE SODIUM SUCCINATE 40 MG: 40 INJECTION, POWDER, FOR SOLUTION INTRAMUSCULAR; INTRAVENOUS at 04:30

## 2022-01-12 RX ADMIN — AMLODIPINE BESYLATE 2.5 MG: 2.5 TABLET ORAL at 09:06

## 2022-01-12 RX ADMIN — ALBUTEROL SULFATE 2 PUFF: 90 AEROSOL, METERED RESPIRATORY (INHALATION) at 21:56

## 2022-01-12 RX ADMIN — FOLIC ACID 1 MG: 1 TABLET ORAL at 09:06

## 2022-01-12 RX ADMIN — HYDRALAZINE HYDROCHLORIDE 10 MG: 20 INJECTION INTRAMUSCULAR; INTRAVENOUS at 06:06

## 2022-01-12 RX ADMIN — GUAIFENESIN AND DEXTROMETHORPHAN 10 ML: 100; 10 SYRUP ORAL at 21:58

## 2022-01-12 RX ADMIN — GABAPENTIN 100 MG: 100 CAPSULE ORAL at 18:23

## 2022-01-12 RX ADMIN — Medication 5 MG: at 21:58

## 2022-01-12 RX ADMIN — ESCITALOPRAM OXALATE 10 MG: 10 TABLET ORAL at 21:58

## 2022-01-12 RX ADMIN — MULTIPLE VITAMINS W/ MINERALS TAB 1 TABLET: TAB at 09:06

## 2022-01-12 RX ADMIN — LISINOPRIL 2.5 MG: 2.5 TABLET ORAL at 09:06

## 2022-01-12 RX ADMIN — ALBUTEROL SULFATE 2 PUFF: 90 AEROSOL, METERED RESPIRATORY (INHALATION) at 13:37

## 2022-01-12 RX ADMIN — HYDRALAZINE HYDROCHLORIDE 10 MG: 20 INJECTION INTRAMUSCULAR; INTRAVENOUS at 01:33

## 2022-01-12 RX ADMIN — GABAPENTIN 100 MG: 100 CAPSULE ORAL at 09:06

## 2022-01-12 RX ADMIN — DIAZEPAM 2 MG: 2 TABLET ORAL at 12:12

## 2022-01-12 ASSESSMENT — ACTIVITIES OF DAILY LIVING (ADL)
ADLS_ACUITY_SCORE: 12

## 2022-01-12 NOTE — PROGRESS NOTES
[facility] MEDICINE PROGRESS NOTE      Length of stay: Day # 12    Identification/Summary: Mesfin Lee is a 71 year old male with a past medical history of alcohol abuse, essential hypertension  who was admitted with chief complaint of alcohol intoxication, difficulty with walking on 12/31/2021.    Admitting impression of COVID-19 infection    Brief history: He was out at the bar drinking and was brought to the ER via ambulance because of difficulty walking.  Denies any prior hospitalization for withdrawal or excessive drinking.  Patient apparently has had a history of withdrawals.  ER course: Was afebrile and vital signs stable.  COVID test was positive.  CT of the head negative for acute finding.  Chest x-ray showed chronic degenerative changes and compression deformities.  States he was vaccinated twice.    Assessment and Plan:    COVID 19 infection, previously vaccinated x2  Acute respiratory failure with hypoxia, resolved  Tested positive on January 1, 2022  Considered recovered from COVID  Treated with Decadron, remdesivir on this admission  CRP improving.  Lovenox for DVT prevention.  Following platelets  Currently on room air but needing oxygen at bedtime  Started on short course of IV steroids  Has underlying bronchospasm as well. Received albuterol.  Still subjectively short of breath and weak and feels not ready to go home.  He is saturating adequately however  He is not ready for booster vaccination.  He still symptomatic in terms of respiratory function.    History of PE 1 year ago but did not comply with anticoagulant  Patient never really took Eliquis.  Received Lovenox for DVT prevention for COVID    Alcohol intoxication, risk for withdrawal  Generalized weakness  Treat as per CIWA scoring  PT and OT.  Significant tremors noted.    Generalized tremors  Not sure if this is from withdrawal.  We will do PT and OT.    Acute hyponatremia, improved    Thrombocytopenia  secondary to chronic alcohol intake    Bradycardia  Holding beta blocker for now.  Also clonidine  Echocardiogram came back normal    Constipation    Hypertension, fairly controlled  risk of hypotension  Clonidine beta-blocker causing bradycardia  Blood pressure in the 150s-170s  Started amlodipine and lisinopril-optimize as clinically indicated.    --------------------------------------------------------------------    Discharge disposition: Planning for TCU.  Diet: Combination Diet Regular Diet Adult  DVT Prophylaxis: Lovenox right now.  Previously on Eliquis  Code Status: Full Code  Living situation: --    ----------------------------------------------------------------------    Cumulative essential/pertinent data reviewed:  Labs:  Potassium 3.8  Last metabolic panel was on 1/8/2022.  Unremarkable.  Renal function normal.  Magnesium normal  Glucose 171  Hemoglobin 14.8  Platelet 163--> 120     Imaging:  Repeat chest x-ray    EKG:     ----------------------------------------------------------------------    Interval History/Subjective:  Patient still complaining of shortness of breath and weakness.  However he is saturating 96% at room air.  Having tremors which he says is worse now.  This may be withdrawal tremors.  Has difficulty with sleep as well.  He said he lives alone.  He feels incapacitated and weak overall  Rest of review of systems unremarkable.    Physical Exam/Objective:  Temp:  [97.3  F (36.3  C)-98.2  F (36.8  C)] 98.2  F (36.8  C)  Pulse:  [75-85] 75  Resp:  [16-20] 18  BP: (148-197)/(74-95) 156/74  SpO2:  [91 %-96 %] 95 %    Body mass index is 41.69 kg/m .    GENERAL:   Alert and coherent.  Complains of shortness of breath and weakness;  appears comfortable, in no acute distress,   HEAD:  Normocephalic, without obvious abnormality   EYES:  Grossly normal;   NOSE: Grossly normal   THROAT: Lips and mouth external: grossly normal,    NECK: No mass noted; no stiffness   BACK:      LUNGS:    Auscultation: Negative for wheezing; No  crackles, symmetric chest rise on inhalation, respirations unlabored    CHEST WALL:  No tenderness or deformity   HEART:  Regular rate and rhythm, significant murmurs: Negative,    ABDOMEN:   Soft, non-tender, no masses, no peritoneal signs   EXTREMITIES:    No tenderness of the calves.  No significant ankle edema   SKIN:  see extremities   NEURO: no signs of acute stroke or change from prior state   PSYCH: Behavior is appropriate     Medications:   Personally Reviewed.    albuterol  2 puff Inhalation Q6H     amLODIPine  2.5 mg Oral Daily     enoxaparin ANTICOAGULANT  40 mg Subcutaneous Q24H     escitalopram  10 mg Oral QPM     folic acid  1 mg Oral Daily     gabapentin  100 mg Oral Q8H     lisinopril  2.5 mg Oral Daily     melatonin  5 mg Oral At Bedtime     [Held by provider] metoprolol tartrate  25 mg Oral BID     multivitamin w/minerals  1 tablet Oral Daily     polyethylene glycol  17 g Oral BID     senna-docusate  2 tablet Oral BID     sodium chloride (PF)  3 mL Intracatheter Q8H     Current Facility-Administered Medications   Medication Dose Route Frequency     acetaminophen  650 mg Oral Q6H PRN    Or     acetaminophen  650 mg Rectal Q6H PRN     benzonatate  100 mg Oral TID PRN     diazepam  10 mg Oral Q30 Min PRN    Or     diazepam  5-10 mg Intravenous Q30 Min PRN     diazepam  2 mg Oral Q12H PRN     flumazenil  0.2 mg Intravenous q1 min prn     guaiFENesin-dextromethorphan  10 mL Oral Q4H PRN     OLANZapine zydis  5-10 mg Oral Q6H PRN    Or     haloperidol lactate  2.5-5 mg Intravenous Q6H PRN     hydrALAZINE  10 mg Intravenous Q4H PRN     lidocaine 4%   Topical Q1H PRN     lidocaine (buffered or not buffered)  0.1-1 mL Other Q1H PRN     - MEDICATION INSTRUCTIONS -   Does not apply Continuous PRN     melatonin  5 mg Oral QPM PRN     ondansetron  4 mg Oral Q6H PRN    Or     ondansetron  4 mg Intravenous Q6H PRN     sodium chloride (PF)  3 mL Intracatheter q1 min prn            Tyler Gutierrez MD  [facility]  [facility]  [facility]  Phone: #[phone]

## 2022-01-12 NOTE — PROGRESS NOTES
Care Management Follow Up    Length of Stay (days): 11        Patient plan of care discussed at interdisciplinary rounds:     Expected Discharge Date:   1/13/2022       Concerns to be Addressed / Barriers to Discharge: medical management, TCU bed     Anticipated Discharge Disposition: TCU    Patient/family educated on Medicare website which has current facility and service quality ratings:  Previously done  Education Provided on the Discharge Plan:    Patient/Family in Agreement with the Plan:       Referrals Placed by CM/SW: yes  Private pay costs discussed:    Additional Information:  Patient COVID recovered 1/11/22. Seeking TCU bed, preferably near [facility]. Resent referrals with updated CM contact info for 1/12/22.     CM following.

## 2022-01-12 NOTE — PLAN OF CARE
Problem: Adult Inpatient Plan of Care  Goal: Plan of Care Review  Outcome: Improving  Pt c/o SOB/unable to catch his breathe- scheduled inhaler administered, prn valium given x1, IS at bedside. Productive cough, dyspea on exertion, Exp wheezes. Hypertensive - PRN hydralazine given x2. Voiding in urinal- odorous, adequate output. A1 Bedside commode- 1 BM overnight.   Plan- waiting on placement to TCU

## 2022-01-13 ENCOUNTER — APPOINTMENT (OUTPATIENT)
Dept: PHYSICAL THERAPY | Facility: CLINIC | Age: 72
DRG: 896 | End: 2022-01-13
Payer: COMMERCIAL

## 2022-01-13 ENCOUNTER — APPOINTMENT (OUTPATIENT)
Dept: OCCUPATIONAL THERAPY | Facility: CLINIC | Age: 72
DRG: 896 | End: 2022-01-13
Payer: COMMERCIAL

## 2022-01-13 LAB
ALBUMIN SERPL-MCNC: 3 G/DL (ref 3.5–5)
ALP SERPL-CCNC: 49 U/L (ref 45–120)
ALT SERPL W P-5'-P-CCNC: 77 U/L (ref 0–45)
ANION GAP SERPL CALCULATED.3IONS-SCNC: 8 MMOL/L (ref 5–18)
AST SERPL W P-5'-P-CCNC: 46 U/L (ref 0–40)
BILIRUB SERPL-MCNC: 0.6 MG/DL (ref 0–1)
BUN SERPL-MCNC: 22 MG/DL (ref 8–28)
C REACTIVE PROTEIN LHE: 0.8 MG/DL (ref 0–0.8)
CALCIUM SERPL-MCNC: 8.9 MG/DL (ref 8.5–10.5)
CHLORIDE BLD-SCNC: 104 MMOL/L (ref 98–107)
CO2 SERPL-SCNC: 25 MMOL/L (ref 22–31)
CREAT SERPL-MCNC: 0.77 MG/DL (ref 0.7–1.3)
ERYTHROCYTE [DISTWIDTH] IN BLOOD BY AUTOMATED COUNT: 13.2 % (ref 10–15)
GFR SERPL CREATININE-BSD FRML MDRD: >90 ML/MIN/1.73M2
GLUCOSE BLD-MCNC: 105 MG/DL (ref 70–125)
HCT VFR BLD AUTO: 43.7 % (ref 40–53)
HGB BLD-MCNC: 14.3 G/DL (ref 13.3–17.7)
MCH RBC QN AUTO: 33 PG (ref 26.5–33)
MCHC RBC AUTO-ENTMCNC: 32.7 G/DL (ref 31.5–36.5)
MCV RBC AUTO: 101 FL (ref 78–100)
PLATELET # BLD AUTO: 166 10E3/UL (ref 150–450)
POTASSIUM BLD-SCNC: 4.1 MMOL/L (ref 3.5–5)
PROT SERPL-MCNC: 7.2 G/DL (ref 6–8)
RBC # BLD AUTO: 4.33 10E6/UL (ref 4.4–5.9)
SODIUM SERPL-SCNC: 137 MMOL/L (ref 136–145)
WBC # BLD AUTO: 9 10E3/UL (ref 4–11)

## 2022-01-13 PROCEDURE — 250N000011 HC RX IP 250 OP 636: Performed by: HOSPITALIST

## 2022-01-13 PROCEDURE — 250N000013 HC RX MED GY IP 250 OP 250 PS 637: Performed by: HOSPITALIST

## 2022-01-13 PROCEDURE — 97116 GAIT TRAINING THERAPY: CPT | Mod: GP

## 2022-01-13 PROCEDURE — 80053 COMPREHEN METABOLIC PANEL: CPT | Performed by: INTERNAL MEDICINE

## 2022-01-13 PROCEDURE — 97535 SELF CARE MNGMENT TRAINING: CPT | Mod: GO

## 2022-01-13 PROCEDURE — 86140 C-REACTIVE PROTEIN: CPT | Performed by: INTERNAL MEDICINE

## 2022-01-13 PROCEDURE — 85027 COMPLETE CBC AUTOMATED: CPT | Performed by: INTERNAL MEDICINE

## 2022-01-13 PROCEDURE — 99233 SBSQ HOSP IP/OBS HIGH 50: CPT | Performed by: INTERNAL MEDICINE

## 2022-01-13 PROCEDURE — 250N000013 HC RX MED GY IP 250 OP 250 PS 637: Performed by: INTERNAL MEDICINE

## 2022-01-13 PROCEDURE — 36415 COLL VENOUS BLD VENIPUNCTURE: CPT | Performed by: INTERNAL MEDICINE

## 2022-01-13 PROCEDURE — 82040 ASSAY OF SERUM ALBUMIN: CPT | Performed by: INTERNAL MEDICINE

## 2022-01-13 PROCEDURE — 120N000001 HC R&B MED SURG/OB

## 2022-01-13 RX ADMIN — POLYETHYLENE GLYCOL 3350 17 G: 17 POWDER, FOR SOLUTION ORAL at 10:17

## 2022-01-13 RX ADMIN — FOLIC ACID 1 MG: 1 TABLET ORAL at 10:19

## 2022-01-13 RX ADMIN — Medication 5 MG: at 21:07

## 2022-01-13 RX ADMIN — ENOXAPARIN SODIUM 40 MG: 100 INJECTION SUBCUTANEOUS at 10:17

## 2022-01-13 RX ADMIN — ALBUTEROL SULFATE 2 PUFF: 90 AEROSOL, METERED RESPIRATORY (INHALATION) at 01:27

## 2022-01-13 RX ADMIN — ALBUTEROL SULFATE 2 PUFF: 90 AEROSOL, METERED RESPIRATORY (INHALATION) at 10:23

## 2022-01-13 RX ADMIN — SENNOSIDES AND DOCUSATE SODIUM 2 TABLET: 50; 8.6 TABLET ORAL at 10:18

## 2022-01-13 RX ADMIN — MULTIPLE VITAMINS W/ MINERALS TAB 1 TABLET: TAB at 10:18

## 2022-01-13 RX ADMIN — GABAPENTIN 100 MG: 100 CAPSULE ORAL at 16:59

## 2022-01-13 RX ADMIN — SENNOSIDES AND DOCUSATE SODIUM 2 TABLET: 50; 8.6 TABLET ORAL at 21:07

## 2022-01-13 RX ADMIN — ESCITALOPRAM OXALATE 10 MG: 10 TABLET ORAL at 21:07

## 2022-01-13 RX ADMIN — GABAPENTIN 100 MG: 100 CAPSULE ORAL at 00:12

## 2022-01-13 RX ADMIN — LISINOPRIL 2.5 MG: 2.5 TABLET ORAL at 10:19

## 2022-01-13 RX ADMIN — AMLODIPINE BESYLATE 2.5 MG: 2.5 TABLET ORAL at 10:18

## 2022-01-13 RX ADMIN — GABAPENTIN 100 MG: 100 CAPSULE ORAL at 10:32

## 2022-01-13 RX ADMIN — ALBUTEROL SULFATE 2 PUFF: 90 AEROSOL, METERED RESPIRATORY (INHALATION) at 14:53

## 2022-01-13 RX ADMIN — ALBUTEROL SULFATE 2 PUFF: 90 AEROSOL, METERED RESPIRATORY (INHALATION) at 21:10

## 2022-01-13 ASSESSMENT — MIFFLIN-ST. JEOR: SCORE: 2220.03

## 2022-01-13 ASSESSMENT — ACTIVITIES OF DAILY LIVING (ADL)
ADLS_ACUITY_SCORE: 12
ADLS_ACUITY_SCORE: 10
ADLS_ACUITY_SCORE: 12
ADLS_ACUITY_SCORE: 10
ADLS_ACUITY_SCORE: 12
ADLS_ACUITY_SCORE: 10
ADLS_ACUITY_SCORE: 12
ADLS_ACUITY_SCORE: 10
ADLS_ACUITY_SCORE: 12
ADLS_ACUITY_SCORE: 12
ADLS_ACUITY_SCORE: 10
ADLS_ACUITY_SCORE: 12

## 2022-01-13 NOTE — PLAN OF CARE
Problem: Gas Exchange Impaired  Goal: Optimal Gas Exchange    Problem: Adult Inpatient Plan of Care  Goal: Absence of Hospital-Acquired Illness or Injury  Outcome: No Change    Vss. A&O x 4. Other than BP's which have been elevated in the 140's. Pt pain has been stated at 4 on 0-10 pain scale. Pt refused pain medication. Cough has been inconsistent and productive. Continue to wait on placement to TCU. Will continue to monitor.

## 2022-01-13 NOTE — PLAN OF CARE
"Pt is alert, oriented, complaining behaviors at times with cares. Pt states he is very exhausted and has \"no energy after Covid.\" Is happy to have worked with PT this afternoon and walked the unit. C/o bilateral foot pain after walking \"I have this pain normally but it is worse after I walk,\" improved at HS. Had chest XR for worsening cough that was negative for pleural effusion, clear per report. Gave PRN guaifenesin at HS for cough, effectiveness TBD. Held stool softeners d/t pt report of loose stools earlier in the day. Continues K protocol, is K lab recheck in AM. Wearing 1LPM O2 at HS.    /79 (BP Location: Right arm)   Pulse 68   Temp 98  F (36.7  C) (Oral)   Resp 22   Ht 1.854 m (6' 1\")   Wt 143.3 kg (316 lb)   SpO2 97%   BMI 41.69 kg/m      "

## 2022-01-13 NOTE — PROGRESS NOTES
[facility] MEDICINE PROGRESS NOTE      Length of stay: Day # 13      Identification/Summary: Mesfin Lee is a 71 year old male with a past medical history of alcohol abuse, essential hypertension  who was admitted with chief complaint of alcohol intoxication, difficulty with walking on 12/31/2021.     Admitting impression of COVID-19 infection     Brief history: He was out at the bar drinking and was brought to the ER via ambulance because of difficulty walking.  Denies any prior hospitalization for withdrawal or excessive drinking.  Patient apparently has had a history of withdrawals.  ER course: Was afebrile and vital signs stable.  COVID test was positive.  CT of the head negative for acute finding.  Chest x-ray showed chronic degenerative changes and compression deformities.  States he was vaccinated twice.     Assessment and Plan:    COVID 19 infection, previously vaccinated x2  Acute respiratory failure with hypoxia, resolved, off oxygen.  Tested positive on January 1, 2022  Considered recovered from COVID  Treated with Decadron, remdesivir on this admission  CRP improving.  Lovenox for DVT prevention.  Following platelets  Currently on room air but needing oxygen at bedtime  Has underlying bronchospasm as well. Received albuterol.  Still has post-COVID shortness of breath. Plan is for TCU placement.  He is not ready for booster vaccination.  He still symptomatic in terms of respiratory function.     History of PE 1 year ago but did not comply with anticoagulant  Patient never really took Eliquis.  Received Lovenox for DVT prevention for COVID  May benefit from Eliquis post-COVID.     Alcohol intoxication, risk for withdrawal  Generalized weakness  Treat as per CIWA scoring  PT and OT.  Significant tremors noted.     Slight elevation of ALT and AST  May be from chronic alcohol use or from COVID infection  No ascites or bleeding.  He is off the remdesivir     Generalized  tremors  Likely has parkinsonism  Not sure if this is from withdrawal.  We will do PT and OT.  Need outpatient evaluation by neurology.     Acute hyponatremia, improved     Thrombocytopenia secondary to chronic alcohol intake     Bradycardia, hemodynamically stable  Holding beta blocker for now.  Also clonidine  Echocardiogram came back normal     Constipation     Hypertension, fairly controlled  risk of hypotension  Clonidine beta-blocker causing bradycardia  Blood pressure in the 150s-170s  Started amlodipine and lisinopril-optimize as clinically indicated.     --------------------------------------------------------------------     Discharge disposition:  Discharge when TCU available  Diet: Combination Diet Regular Diet Adult  DVT Prophylaxis: Lovenox right now.  Previously on Eliquis  Code Status: Full Code  Living situation: --     ----------------------------------------------------------------------     Cumulative essential/pertinent data reviewed:  Labs:  Potassium 3.8  Last metabolic panel was on 1/8/2022.  Unremarkable.  Renal function normal.  Magnesium normal  Glucose 171  Hemoglobin 14.8  Platelet 163--> 120  LFTs slightly elevated ALT 77, AST 46  CRP down to 0.8  Renal function normal  CBC unremarkable     Imaging:  Repeat chest x-ray    ----------------------------------------------------------------------    Interval History/Subjective:  Patient says he is still unsteady.  He did walk the hallway using a walker.  He lives alone.  He is waiting for his bed at [facility].  He still feels short of breath on exertion.  However is not needing oxygen.  Last night he is down to 1 L.  Denies any chest pressure or abdominal pain.  Eating fairly well.  There is a lot of tremors.  He does have a history of withdrawal from alcohol but he says he does have chronic tremors on a regular basis.  He likely has some parkinsonism.  He has not seen a neurologist for this.  Rest of review of systems unremarkable.    Physical  Exam/Objective:  Temp:  [97.3  F (36.3  C)-98.3  F (36.8  C)] 97.7  F (36.5  C)  Pulse:  [64-82] 74  Resp:  [20-24] 22  BP: (130-178)/(67-94) 142/67  SpO2:  [91 %-97 %] 91 %  Blood pressure fairly controlled in the 140s. No fever.  Back on 1 L/min nasal cannula.  Body mass index is 40.9 kg/m .    GENERAL:   Alert and coherent.  Pleasant and conversant.  Verbally complains of shortness of breath when exerting;  appears comfortable, in no acute distress,   HEAD:  Normocephalic, without obvious abnormality   EYES:  Grossly normal;    NOSE: Grossly normal   THROAT: Lips and mouth external: grossly normal,    NECK: No mass noted; no stiffness   BACK:      LUNGS:   Auscultation: Negative for wheezing; No  crackles, symmetric chest rise on inhalation, respirations unlabored    CHEST WALL:  No tenderness or deformity   HEART:  Regular rate and rhythm, significant murmurs: Negative,   ABDOMEN:   Soft, non-tender, no masses, no peritoneal signs   EXTREMITIES:   No tenderness of the calves.  No significant ankle edema   SKIN:  see extremities   NEURO: no signs of acute stroke or change from prior state, he has resting tremors of the extremities.   PSYCH: Behavior is appropriate     Medications:   Personally Reviewed.    albuterol  2 puff Inhalation Q6H     amLODIPine  2.5 mg Oral Daily     enoxaparin ANTICOAGULANT  40 mg Subcutaneous Q24H     escitalopram  10 mg Oral QPM     folic acid  1 mg Oral Daily     gabapentin  100 mg Oral Q8H     lisinopril  2.5 mg Oral Daily     melatonin  5 mg Oral At Bedtime     [Held by provider] metoprolol tartrate  25 mg Oral BID     multivitamin w/minerals  1 tablet Oral Daily     polyethylene glycol  17 g Oral BID     senna-docusate  2 tablet Oral BID     sodium chloride (PF)  3 mL Intracatheter Q8H     Current Facility-Administered Medications   Medication Dose Route Frequency     acetaminophen  650 mg Oral Q6H PRN    Or     acetaminophen  650 mg Rectal Q6H PRN     benzonatate  100 mg Oral TID  PRN     diazepam  10 mg Oral Q30 Min PRN    Or     diazepam  5-10 mg Intravenous Q30 Min PRN     diazepam  2 mg Oral Q12H PRN     flumazenil  0.2 mg Intravenous q1 min prn     guaiFENesin-dextromethorphan  10 mL Oral Q4H PRN     OLANZapine zydis  5-10 mg Oral Q6H PRN    Or     haloperidol lactate  2.5-5 mg Intravenous Q6H PRN     hydrALAZINE  10 mg Intravenous Q4H PRN     lidocaine 4%   Topical Q1H PRN     lidocaine (buffered or not buffered)  0.1-1 mL Other Q1H PRN     - MEDICATION INSTRUCTIONS -   Does not apply Continuous PRN     melatonin  5 mg Oral QPM PRN     ondansetron  4 mg Oral Q6H PRN    Or     ondansetron  4 mg Intravenous Q6H PRN     sodium chloride (PF)  3 mL Intracatheter q1 min prn           Tyler Gutierrez MD  [facility]  Phone: #[phone]

## 2022-01-13 NOTE — PROGRESS NOTES
Care Management Follow Up    Length of Stay (days): 12        Patient plan of care discussed at interdisciplinary rounds:     Expected Discharge Date:   1/13/2022       Concerns to be Addressed / Barriers to Discharge: medical management, TCU bed     Anticipated Discharge Disposition: TCU    Patient/family educated on Medicare website which has current facility and service quality ratings:  Previously done  Education Provided on the Discharge Plan:    Patient/Family in Agreement with the Plan:       Referrals Placed by CM/SW: yes  Private pay costs discussed:    Additional Information:  Patient COVID recovered 1/11/22. Seeking TCU bed, preferably near [facility]. No return call from admissions.     [facility] ([facility] TCU and [facility] TCU are currently full.     Left message for admissions at [facility] ([facility] TCU, [facility] TCU and [facility] ([facility]). Requested return call to [phone].       CM following.

## 2022-01-14 ENCOUNTER — APPOINTMENT (OUTPATIENT)
Dept: PHYSICAL THERAPY | Facility: CLINIC | Age: 72
DRG: 896 | End: 2022-01-14
Payer: COMMERCIAL

## 2022-01-14 ENCOUNTER — APPOINTMENT (OUTPATIENT)
Dept: OCCUPATIONAL THERAPY | Facility: CLINIC | Age: 72
DRG: 896 | End: 2022-01-14
Payer: COMMERCIAL

## 2022-01-14 LAB — POTASSIUM BLD-SCNC: 4.7 MMOL/L (ref 3.5–5)

## 2022-01-14 PROCEDURE — 97535 SELF CARE MNGMENT TRAINING: CPT | Mod: GO

## 2022-01-14 PROCEDURE — 84132 ASSAY OF SERUM POTASSIUM: CPT | Performed by: INTERNAL MEDICINE

## 2022-01-14 PROCEDURE — 250N000013 HC RX MED GY IP 250 OP 250 PS 637: Performed by: HOSPITALIST

## 2022-01-14 PROCEDURE — 99207 PR CDG-CODE CATEGORY CHANGED: CPT | Performed by: INTERNAL MEDICINE

## 2022-01-14 PROCEDURE — 250N000013 HC RX MED GY IP 250 OP 250 PS 637: Performed by: INTERNAL MEDICINE

## 2022-01-14 PROCEDURE — 250N000011 HC RX IP 250 OP 636: Performed by: HOSPITALIST

## 2022-01-14 PROCEDURE — 120N000001 HC R&B MED SURG/OB

## 2022-01-14 PROCEDURE — 99232 SBSQ HOSP IP/OBS MODERATE 35: CPT | Performed by: INTERNAL MEDICINE

## 2022-01-14 PROCEDURE — 36415 COLL VENOUS BLD VENIPUNCTURE: CPT | Performed by: INTERNAL MEDICINE

## 2022-01-14 PROCEDURE — 97116 GAIT TRAINING THERAPY: CPT | Mod: GP

## 2022-01-14 PROCEDURE — 97110 THERAPEUTIC EXERCISES: CPT | Mod: GO

## 2022-01-14 RX ADMIN — MULTIPLE VITAMINS W/ MINERALS TAB 1 TABLET: TAB at 08:25

## 2022-01-14 RX ADMIN — ALBUTEROL SULFATE 2 PUFF: 90 AEROSOL, METERED RESPIRATORY (INHALATION) at 14:39

## 2022-01-14 RX ADMIN — ENOXAPARIN SODIUM 40 MG: 100 INJECTION SUBCUTANEOUS at 08:25

## 2022-01-14 RX ADMIN — ALBUTEROL SULFATE 2 PUFF: 90 AEROSOL, METERED RESPIRATORY (INHALATION) at 20:50

## 2022-01-14 RX ADMIN — FOLIC ACID 1 MG: 1 TABLET ORAL at 08:25

## 2022-01-14 RX ADMIN — ALBUTEROL SULFATE 2 PUFF: 90 AEROSOL, METERED RESPIRATORY (INHALATION) at 07:55

## 2022-01-14 RX ADMIN — Medication 5 MG: at 20:51

## 2022-01-14 RX ADMIN — AMLODIPINE BESYLATE 2.5 MG: 2.5 TABLET ORAL at 08:25

## 2022-01-14 RX ADMIN — ALBUTEROL SULFATE 2 PUFF: 90 AEROSOL, METERED RESPIRATORY (INHALATION) at 02:21

## 2022-01-14 RX ADMIN — ESCITALOPRAM OXALATE 10 MG: 10 TABLET ORAL at 20:51

## 2022-01-14 RX ADMIN — LISINOPRIL 2.5 MG: 2.5 TABLET ORAL at 08:25

## 2022-01-14 ASSESSMENT — ACTIVITIES OF DAILY LIVING (ADL)
ADLS_ACUITY_SCORE: 12

## 2022-01-14 NOTE — PROGRESS NOTES
[facility] MEDICINE PROGRESS NOTE      Length of stay: Day # 15    Identification/Summary: Mesfin Lee is a 71 year old male with a past medical history of alcohol abuse, essential hypertension  who was admitted with chief complaint of alcohol intoxication, difficulty with walking on 12/31/2021.     Admitting impression of COVID-19 infection     Brief history: He was out at the bar drinking and was brought to the ER via ambulance because of difficulty walking.  Denies any prior hospitalization for withdrawal or excessive drinking.  Patient apparently has had a history of withdrawals.  ER course: Was afebrile and vital signs stable.  COVID test was positive.  CT of the head negative for acute finding.  Chest x-ray showed chronic degenerative changes and compression deformities.  States he was vaccinated twice.     Assessment and Plan:    COVID 19 infection, previously vaccinated x2, radiographically resolved  Acute respiratory failure with hypoxia, resolved, off oxygen.  Tested positive on January 1, 2022  Considered recovered from COVID  Treated with Decadron, remdesivir on this admission  CRP improving.  Lovenox for DVT prevention.  Following platelets  Currently on room air but needing oxygen at bedtime  Has underlying bronchospasm as well. Received albuterol.  Still has post-COVID shortness of breath. Plan is for TCU placement.  He is not ready for booster vaccination.  He still symptomatic in terms of respiratory function.  Repeat chest x-ray shows negative chest.  The lungs are clear and there are no pleural effusions.  Normal heart size.     History of PE 1 year ago but did not comply with anticoagulant  Patient never really took Eliquis.  Received Lovenox for DVT prevention for COVID  May benefit from Eliquis post-COVID.     Alcohol intoxication, risk for withdrawal  Generalized weakness  Parkinsonism   Treat as per CIWA scoring--> discontinued.  PT and OT.-->  Recommending  TCU placement  Significant tremors noted.   He will need to see a neurologist down the road for his parkinsonism.  He has resting tremors.  These tremors are not related to withdrawal.     Slight elevation of ALT and AST  May be from chronic alcohol use or from COVID infection  No ascites or bleeding.  He is off the remdesivir     Generalized tremors  Likely has parkinsonism  Not sure if this is from withdrawal.  We will do PT and OT.  Need outpatient evaluation by neurology.     Acute hyponatremia, improved     Thrombocytopenia secondary to chronic alcohol intake     Bradycardia, hemodynamically stable  Holding beta blocker for now.  Also clonidine  Echocardiogram came back normal     Constipation     Hypertension, fairly controlled  risk of hypotension  Clonidine beta-blocker causing bradycardia  Blood pressure in the 150s-170s  Started amlodipine and lisinopril-optimize as clinically indicated.    Consults on this case:  none     --------------------------------------------------------------------     Discharge disposition:  Discharge when TCU available  Diet: Combination Diet Regular Diet Adult  DVT Prophylaxis: Lovenox right now.  Previously on Eliquis  Code Status: Full Code  Living situation: --     ----------------------------------------------------------------------     Cumulative essential/pertinent data reviewed:  Labs:  Potassium 3.8  Last metabolic panel was on 1/8/2022.  Unremarkable.  Renal function normal.  Magnesium normal  Glucose 171  Hemoglobin 14.8  Platelet 163--> 120  LFTs slightly elevated ALT 77, AST 46  CRP down to 0.8  Renal function normal  CBC unremarkable     Imaging:  Repeat chest x-ray:                                                                 IMPRESSION: Negative chest.  The lungs are clear and there are no pleural effusions. Normal heart size.      ----------------------------------------------------------------------    Interval History/Subjective:  Patient did physical  therapy.  He walked in the hallways.  He does have resting tremors which is not new.  Denies any chest pain or shortness of breath.  No coughing.  He is no longer needing oxygen for some time.  Chest x-ray was normal.  Ate adequately.  Looking forward to TCU placement  Rest of review of systems unremarkable.    Physical Exam/Objective:  Temp:  [97.5  F (36.4  C)-98.3  F (36.8  C)] 98.1  F (36.7  C)  Pulse:  [75-99] 80  Resp:  [12-18] 18  BP: (136-176)/(65-95) 145/65  SpO2:  [91 %-100 %] 91 %    Body mass index is 40.9 kg/m .    GENERAL:   Conversant and coherent.;  appears comfortable, in no acute distress,   HEAD:  Normocephalic, without obvious abnormality   EYES:  Grossly normal;    NOSE: Grossly normal   THROAT: Lips and mouth external: grossly normal,    NECK: No mass noted; no stiffness   BACK:      LUNGS:   Auscultation: Negative for wheezing; negative for crackles, symmetric chest rise on inhalation, respirations unlabored    CHEST WALL:  No tenderness or deformity   HEART:  Regular rate and rhythm, significant murmurs: Negative,   ABDOMEN:   Soft, non-tender, no masses, no peritoneal signs   EXTREMITIES:   No tenderness of the calves.  No significant ankle edema   SKIN:  see extremities   NEURO: no signs of acute stroke or change from prior state   PSYCH: Behavior is appropriate     Medications:   Personally Reviewed.    albuterol  2 puff Inhalation Q6H     amLODIPine  2.5 mg Oral Daily     enoxaparin ANTICOAGULANT  40 mg Subcutaneous Q24H     escitalopram  10 mg Oral QPM     folic acid  1 mg Oral Daily     lisinopril  2.5 mg Oral Daily     melatonin  5 mg Oral At Bedtime     [Held by provider] metoprolol tartrate  25 mg Oral BID     multivitamin w/minerals  1 tablet Oral Daily     polyethylene glycol  17 g Oral BID     senna-docusate  2 tablet Oral BID     sodium chloride (PF)  3 mL Intracatheter Q8H     Current Facility-Administered Medications   Medication Dose Route Frequency     acetaminophen  650 mg  Oral Q6H PRN    Or     acetaminophen  650 mg Rectal Q6H PRN     benzonatate  100 mg Oral TID PRN     diazepam  2 mg Oral Q12H PRN     flumazenil  0.2 mg Intravenous q1 min prn     guaiFENesin-dextromethorphan  10 mL Oral Q4H PRN     hydrALAZINE  10 mg Intravenous Q4H PRN     lidocaine 4%   Topical Q1H PRN     lidocaine (buffered or not buffered)  0.1-1 mL Other Q1H PRN     - MEDICATION INSTRUCTIONS -   Does not apply Continuous PRN     melatonin  5 mg Oral QPM PRN     ondansetron  4 mg Oral Q6H PRN    Or     ondansetron  4 mg Intravenous Q6H PRN     sodium chloride (PF)  3 mL Intracatheter q1 min prn         Tyler Gutierrez MD  Hospitalist  [facility]  [facility]  Phone: #[phone]

## 2022-01-14 NOTE — PROGRESS NOTES
Care Management Follow Up    Length of Stay (days): 13        Patient plan of care discussed at interdisciplinary rounds: Yes     Expected Discharge Date:   1/14/2022 pending TCU bed       Concerns to be Addressed / Barriers to Discharge: TCU bed     Anticipated Discharge Disposition: TCU    Patient/family educated on Medicare website which has current facility and service quality ratings:  Previously done  Education Provided on the Discharge Plan:   yes  Patient/Family in Agreement with the Plan:   yes    Referrals Placed by CM/SW: yes  Private pay costs discussed:     Additional Information:  1/14/2022  10:58 AM Received message requesting full referral to be sent to [facility] TCU (deompression site). Sent.      Awaiting return calls from admissions. Updated patient re: still working on finding TCU bed. Previously patient stated he did not wish a referral sent to [facility] TCU. However, now informed this writer he changed his mind as he would like to be close to [facility] if possible. Explained due to COVID pandemic,  TCU bed availability is tight throughout [facility] but will continue to seek a TCU bed and expand into surrounding area near [place]. No further questions at this time.  (referral sent)    Care Management staff previously discussed current COVID 19 pandemic and potential Medicare waiver of the traditional 3 day stay requirement.   1. This patient has been evaluated from a nursing home in the emergency area? yes   2. This patient is being discharged from a hospital (in the emergency area) in order to provide care to more seriously ill patients? yes   3. This patient needs SNF care as a result of the emergency, regardless of whether he/she was in a hospital/nursing home prior to this stay? yes

## 2022-01-14 NOTE — PLAN OF CARE
Problem: Adult Inpatient Plan of Care  Goal: Readiness for Transition of Care  Outcome: Adequate for Discharge     Patient alert and oriented, call light within reach and bed alarm on. Not our of bed this shift. VSS on 1L NC to maintain sats >90%. Reports minimal pain in bilateral feet and denies the need for any intervention. Plans to transition to TCU once placement is secured.

## 2022-01-14 NOTE — PLAN OF CARE
Problem: Gas Exchange Impaired  Goal: Optimal Gas Exchange  Outcome: Improving     No reports of pain, however has also been denying pain interventions. Satting >90% on RA while awake. 1L of O2 applied while asleep. Awaiting placement, CM following for discharge.

## 2022-01-15 ENCOUNTER — HOSPITAL ENCOUNTER (OUTPATIENT)
Age: 72
End: 2022-01-15
Payer: COMMERCIAL

## 2022-01-15 ENCOUNTER — APPOINTMENT (OUTPATIENT)
Dept: CT IMAGING | Facility: CLINIC | Age: 72
DRG: 896 | End: 2022-01-15
Attending: INTERNAL MEDICINE
Payer: COMMERCIAL

## 2022-01-15 LAB
AMMONIA PLAS-SCNC: 33 UMOL/L (ref 11–35)
BASE EXCESS BLDV CALC-SCNC: 5 MMOL/L
BNP SERPL-MCNC: 51 PG/ML (ref 0–67)
ERYTHROCYTE [DISTWIDTH] IN BLOOD BY AUTOMATED COUNT: 13.2 % (ref 10–15)
HCO3 BLDV-SCNC: 28 MMOL/L (ref 24–30)
HCT VFR BLD AUTO: 46.6 % (ref 40–53)
HGB BLD-MCNC: 14.9 G/DL (ref 13.3–17.7)
HOLD SPECIMEN: NORMAL
MCH RBC QN AUTO: 34.6 PG (ref 26.5–33)
MCHC RBC AUTO-ENTMCNC: 32 G/DL (ref 31.5–36.5)
MCV RBC AUTO: 108 FL (ref 78–100)
OXYHGB MFR BLDV: 84.1 % (ref 70–75)
PCO2 BLDV: 45 MM HG (ref 35–50)
PH BLDV: 7.43 [PH] (ref 7.35–7.45)
PLATELET # BLD AUTO: 214 10E3/UL (ref 150–450)
PO2 BLDV: 50 MM HG (ref 25–47)
POTASSIUM BLD-SCNC: 4.4 MMOL/L (ref 3.5–5)
RBC # BLD AUTO: 4.31 10E6/UL (ref 4.4–5.9)
SAO2 % BLDV: 86 % (ref 70–75)
TROPONIN I SERPL-MCNC: <0.01 NG/ML (ref 0–0.29)
UFH PPP CHRO-ACNC: 0.37 IU/ML
WBC # BLD AUTO: 7.7 10E3/UL (ref 4–11)

## 2022-01-15 PROCEDURE — 36415 COLL VENOUS BLD VENIPUNCTURE: CPT | Performed by: INTERNAL MEDICINE

## 2022-01-15 PROCEDURE — 250N000013 HC RX MED GY IP 250 OP 250 PS 637: Performed by: INTERNAL MEDICINE

## 2022-01-15 PROCEDURE — 82140 ASSAY OF AMMONIA: CPT | Performed by: INTERNAL MEDICINE

## 2022-01-15 PROCEDURE — 250N000013 HC RX MED GY IP 250 OP 250 PS 637: Performed by: HOSPITALIST

## 2022-01-15 PROCEDURE — 250N000011 HC RX IP 250 OP 636: Performed by: INTERNAL MEDICINE

## 2022-01-15 PROCEDURE — 84484 ASSAY OF TROPONIN QUANT: CPT | Performed by: INTERNAL MEDICINE

## 2022-01-15 PROCEDURE — 84132 ASSAY OF SERUM POTASSIUM: CPT | Performed by: HOSPITALIST

## 2022-01-15 PROCEDURE — 71275 CT ANGIOGRAPHY CHEST: CPT

## 2022-01-15 PROCEDURE — 93005 ELECTROCARDIOGRAM TRACING: CPT | Performed by: INTERNAL MEDICINE

## 2022-01-15 PROCEDURE — 93010 ELECTROCARDIOGRAM REPORT: CPT | Performed by: INTERNAL MEDICINE

## 2022-01-15 PROCEDURE — 999N000157 HC STATISTIC RCP TIME EA 10 MIN

## 2022-01-15 PROCEDURE — 120N000001 HC R&B MED SURG/OB

## 2022-01-15 PROCEDURE — 85014 HEMATOCRIT: CPT | Performed by: INTERNAL MEDICINE

## 2022-01-15 PROCEDURE — 82805 BLOOD GASES W/O2 SATURATION: CPT | Performed by: INTERNAL MEDICINE

## 2022-01-15 PROCEDURE — 85520 HEPARIN ASSAY: CPT | Performed by: INTERNAL MEDICINE

## 2022-01-15 PROCEDURE — 93005 ELECTROCARDIOGRAM TRACING: CPT

## 2022-01-15 PROCEDURE — 83880 ASSAY OF NATRIURETIC PEPTIDE: CPT | Performed by: INTERNAL MEDICINE

## 2022-01-15 PROCEDURE — 250N000011 HC RX IP 250 OP 636: Performed by: HOSPITALIST

## 2022-01-15 PROCEDURE — 99233 SBSQ HOSP IP/OBS HIGH 50: CPT | Performed by: INTERNAL MEDICINE

## 2022-01-15 PROCEDURE — 36415 COLL VENOUS BLD VENIPUNCTURE: CPT | Performed by: HOSPITALIST

## 2022-01-15 RX ORDER — LORAZEPAM 1 MG/1
1 TABLET ORAL ONCE
Status: COMPLETED | OUTPATIENT
Start: 2022-01-15 | End: 2022-01-15

## 2022-01-15 RX ORDER — ONDANSETRON 2 MG/ML
4 INJECTION INTRAMUSCULAR; INTRAVENOUS EVERY 6 HOURS PRN
Status: CANCELLED | OUTPATIENT
Start: 2022-01-15

## 2022-01-15 RX ORDER — DIAZEPAM 2 MG
2 TABLET ORAL EVERY 12 HOURS PRN
Status: CANCELLED | OUTPATIENT
Start: 2022-01-15

## 2022-01-15 RX ORDER — ESCITALOPRAM OXALATE 10 MG/1
10 TABLET ORAL EVERY EVENING
Status: CANCELLED | OUTPATIENT
Start: 2022-01-15

## 2022-01-15 RX ORDER — AMLODIPINE BESYLATE 2.5 MG/1
2.5 TABLET ORAL DAILY
Status: CANCELLED | OUTPATIENT
Start: 2022-01-15

## 2022-01-15 RX ORDER — MULTIPLE VITAMINS W/ MINERALS TAB 9MG-400MCG
1 TAB ORAL DAILY
Status: CANCELLED | OUTPATIENT
Start: 2022-01-15

## 2022-01-15 RX ORDER — METOPROLOL TARTRATE 25 MG/1
25 TABLET, FILM COATED ORAL 2 TIMES DAILY
Status: CANCELLED | OUTPATIENT
Start: 2022-01-15

## 2022-01-15 RX ORDER — HEPARIN SODIUM 10000 [USP'U]/100ML
0-5000 INJECTION, SOLUTION INTRAVENOUS CONTINUOUS
Status: DISCONTINUED | OUTPATIENT
Start: 2022-01-15 | End: 2022-01-15

## 2022-01-15 RX ORDER — POLYETHYLENE GLYCOL 3350 17 G/17G
17 POWDER, FOR SOLUTION ORAL 2 TIMES DAILY
Status: CANCELLED | OUTPATIENT
Start: 2022-01-15

## 2022-01-15 RX ORDER — ACETAMINOPHEN 325 MG/1
650 TABLET ORAL EVERY 6 HOURS PRN
Status: CANCELLED | OUTPATIENT
Start: 2022-01-15

## 2022-01-15 RX ORDER — GUAIFENESIN/DEXTROMETHORPHAN 100-10MG/5
10 SYRUP ORAL EVERY 4 HOURS PRN
Status: CANCELLED | OUTPATIENT
Start: 2022-01-15

## 2022-01-15 RX ORDER — ONDANSETRON 4 MG/1
4 TABLET, ORALLY DISINTEGRATING ORAL EVERY 6 HOURS PRN
Status: CANCELLED | OUTPATIENT
Start: 2022-01-15

## 2022-01-15 RX ORDER — ACETAMINOPHEN 650 MG/1
650 SUPPOSITORY RECTAL EVERY 6 HOURS PRN
Status: CANCELLED | OUTPATIENT
Start: 2022-01-15

## 2022-01-15 RX ORDER — FLUMAZENIL 0.1 MG/ML
0.2 INJECTION, SOLUTION INTRAVENOUS
Status: CANCELLED | OUTPATIENT
Start: 2022-01-15

## 2022-01-15 RX ORDER — IOPAMIDOL 755 MG/ML
100 INJECTION, SOLUTION INTRAVASCULAR ONCE
Status: COMPLETED | OUTPATIENT
Start: 2022-01-15 | End: 2022-01-15

## 2022-01-15 RX ORDER — AMOXICILLIN 250 MG
2 CAPSULE ORAL 2 TIMES DAILY
Status: CANCELLED | OUTPATIENT
Start: 2022-01-15

## 2022-01-15 RX ORDER — LISINOPRIL 2.5 MG/1
2.5 TABLET ORAL DAILY
Status: CANCELLED | OUTPATIENT
Start: 2022-01-15

## 2022-01-15 RX ORDER — HEPARIN SODIUM 10000 [USP'U]/100ML
0-5000 INJECTION, SOLUTION INTRAVENOUS CONTINUOUS
Status: CANCELLED | OUTPATIENT
Start: 2022-01-15

## 2022-01-15 RX ORDER — ALBUTEROL SULFATE 90 UG/1
2 AEROSOL, METERED RESPIRATORY (INHALATION)
Status: CANCELLED | OUTPATIENT
Start: 2022-01-15

## 2022-01-15 RX ORDER — HYDRALAZINE HYDROCHLORIDE 20 MG/ML
10 INJECTION INTRAMUSCULAR; INTRAVENOUS EVERY 4 HOURS PRN
Status: CANCELLED | OUTPATIENT
Start: 2022-01-15

## 2022-01-15 RX ORDER — BENZONATATE 100 MG/1
100 CAPSULE ORAL 3 TIMES DAILY PRN
Status: CANCELLED | OUTPATIENT
Start: 2022-01-15

## 2022-01-15 RX ORDER — LIDOCAINE 40 MG/G
CREAM TOPICAL
Status: CANCELLED | OUTPATIENT
Start: 2022-01-15

## 2022-01-15 RX ORDER — FOLIC ACID 1 MG/1
1 TABLET ORAL DAILY
Status: CANCELLED | OUTPATIENT
Start: 2022-01-15

## 2022-01-15 RX ORDER — HEPARIN SODIUM 10000 [USP'U]/100ML
0-5000 INJECTION, SOLUTION INTRAVENOUS CONTINUOUS
Status: DISPENSED | OUTPATIENT
Start: 2022-01-15 | End: 2022-01-17

## 2022-01-15 RX ADMIN — ENOXAPARIN SODIUM 40 MG: 100 INJECTION SUBCUTANEOUS at 09:09

## 2022-01-15 RX ADMIN — ALBUTEROL SULFATE 2 PUFF: 90 AEROSOL, METERED RESPIRATORY (INHALATION) at 13:23

## 2022-01-15 RX ADMIN — HEPARIN SODIUM 1800 UNITS/HR: 10000 INJECTION, SOLUTION INTRAVENOUS at 23:41

## 2022-01-15 RX ADMIN — Medication 5 MG: at 21:17

## 2022-01-15 RX ADMIN — FOLIC ACID 1 MG: 1 TABLET ORAL at 09:09

## 2022-01-15 RX ADMIN — ESCITALOPRAM OXALATE 10 MG: 10 TABLET ORAL at 21:17

## 2022-01-15 RX ADMIN — LISINOPRIL 2.5 MG: 2.5 TABLET ORAL at 09:09

## 2022-01-15 RX ADMIN — MULTIPLE VITAMINS W/ MINERALS TAB 1 TABLET: TAB at 09:09

## 2022-01-15 RX ADMIN — IOPAMIDOL 100 ML: 755 INJECTION, SOLUTION INTRAVENOUS at 13:08

## 2022-01-15 RX ADMIN — LORAZEPAM 1 MG: 1 TABLET ORAL at 15:33

## 2022-01-15 RX ADMIN — ALBUTEROL SULFATE 2 PUFF: 90 AEROSOL, METERED RESPIRATORY (INHALATION) at 20:11

## 2022-01-15 RX ADMIN — DIAZEPAM 2 MG: 2 TABLET ORAL at 10:38

## 2022-01-15 RX ADMIN — ALBUTEROL SULFATE 2 PUFF: 90 AEROSOL, METERED RESPIRATORY (INHALATION) at 09:09

## 2022-01-15 RX ADMIN — ALBUTEROL SULFATE 2 PUFF: 90 AEROSOL, METERED RESPIRATORY (INHALATION) at 02:19

## 2022-01-15 RX ADMIN — HEPARIN SODIUM 1800 UNITS/HR: 10000 INJECTION, SOLUTION INTRAVENOUS at 15:03

## 2022-01-15 RX ADMIN — AMLODIPINE BESYLATE 2.5 MG: 2.5 TABLET ORAL at 09:09

## 2022-01-15 ASSESSMENT — ACTIVITIES OF DAILY LIVING (ADL)
ADLS_ACUITY_SCORE: 12

## 2022-01-15 ASSESSMENT — MIFFLIN-ST. JEOR: SCORE: 2212.32

## 2022-01-15 NOTE — PROGRESS NOTES
CTA positive for PE, with saddle embolus with RV strain.  Heparin infusion started with loading dose  Patient's weight is more than 100 kg    Blood pressure stable.  Patient is awake and alert.  He has subjective shortness of breath.  He is needing 2 L of oxygen.    I consulted pulmonary service Dr. Patterson.  I also consulted interventional radiology.  Dr. Everett.  Currently the patient is hemodynamically stable the ideal situation will be for the patient to be in a hospital that is close to interventional radiology in the event that it takes a turn for the worse.    Options will be catheter directed thrombectomy, pulmonary embolectomy or systemic thrombolytic.    Patient is currently stable hemodynamically.  He will need an echocardiogram.  Also serial troponins.    Tyler Gutierrez MD

## 2022-01-15 NOTE — CONSULTS
PULMONARY EMBOLISM RESPONSE TEAM TELEPHONE CONSULT     Called by Dr. Gutierrez, Hospital Medicine at [facility], regarding patient Mesfin Lee for consideration of therapy for large saddle PE.       Mesfin Lee is a 71 year old gentleman w/ h/o Hep C, obesity, HTN, depression, and PE 1 year ago brought to the ED for alcohol intoxication 1/1/2022 found to have COVID-19 and hypoxia.  Symptoms include intoxication and difficulty walking.  He now has mildly increased respiratory effort though otherwise appears ok with mild tremor.  CT PE revealed large saddle embolus.  There was right heart strain noted on CT as well based on R:L ventricular size ratio.      He had a PE diagnosed 1 year ago thought to be provoked by a mechanical fall with multiple rib fractures leading to prolonged hospital stay.  He was noted to have mild RV dysfunction at that time.  He was discharged on Eliquis which he did not complete as prescribed, by report.  He presented to the ED 1/1/2022 for alcohol intoxication and was found to have COVID.  He had recovered but further evaluation related to persistent mildly increased respiratory effort led to CT PE today which showed large saddle PE.  PERT was activated for consideration of next steps.      1. Risk stratification:   SPESI Score: 0, intermittent O2 needs could increase to 1  RV:LV ratio by CT: 1.2  RV enlargement and dysfunction by echo: pending  LE Doppler findings: pending  Biomarker data: Tpn normal, NT-proBNP normal  Vital signs: /95, HR 80  Classification of PE: low risk for acute PE while likely chronic PE causing chronic RV remodeling and dysfunction.  Normal biomarkers and hemodynamics are reassuring.     2. Treatment Recommendations:   Based on low risk submassive categorization, we recommend systemic anticoagulation if risk of bleed is permissible.  Interventional therapies and lytic therapies are not indicated given the good clinical status, but they remain options if  his clinical status worsens.  Would recommend the following:  --initiation of anticoagulation: may start with heparin w/ transition to DOAC prior to discharge vs. start with DOAC today depending on plans for additional therapies during this hospital admission and predicted need for anticoagulation cessation  --lower extremity venous ultrasounds to examine for DVT; if large DVT burden may consider IVC filter placement  --agree with plan for TTE to evaluate RV size and function though findings consistent with chronic PE may complicate the interpretation  --follow-up in cardiology pulmonary HTN clinic for evaluation of potential CTEPH and ongoing anticoagulation, referral placed     PE Response Team Colleagues present for the discussion included Interventional Radiology, Critical Care, and Cardiology.     At the time of our discussion over the phone I was unable to see and personally evaluate the patient.  I was able to access the patient s medical records/chart and review the chart related to the specific question.      My conversation with Dr. Gutierrez is not meant to replace or supersede the clinical judgement of the in-person treating provider.     Simon Laguerre MD, PhD  Interventional/Critical Care Cardiology  [phone]     January 15, 2022

## 2022-01-15 NOTE — PROGRESS NOTES
[facility] MEDICINE PROGRESS NOTE      Length of stay: Day # 16    Identification/Summary: Mesfin Lee is a 71 year old male with a past medical history of alcohol abuse, essential hypertension  who was admitted with chief complaint of alcohol intoxication, difficulty with walking on 12/31/2021.     Admitting impression of COVID-19 infection     Brief history: He was out at the bar drinking and was brought to the ER via ambulance because of difficulty walking.  Denies any prior hospitalization for withdrawal or excessive drinking.  Patient apparently has had a history of withdrawals.  ER course: Was afebrile and vital signs stable.  COVID test was positive.  CT of the head negative for acute finding.  Chest x-ray showed chronic degenerative changes and compression deformities.  States he was vaccinated twice.     Assessment and Plan:    COVID 19 infection, previously vaccinated x2, radiographically resolved  Acute respiratory failure with hypoxia, resolved, off oxygen.  Tested positive on January 1, 2022  Considered recovered from COVID  Treated with Decadron, remdesivir on this admission  CRP improving.  Lovenox for DVT prevention.  Following platelets--> I do not think he needs Eliquis upon discharge.  Currently on room air but needing oxygen at bedtime  Has underlying bronchospasm as well. Received albuterol.  Still has post-COVID shortness of breath. Plan is for TCU placement.  He is not ready for booster vaccination.  He still symptomatic in terms of respiratory function.  Repeat chest x-ray shows negative chest.  The lungs are clear and there are no pleural effusions.  Normal heart size.     History of PE 1 year ago but did not comply with anticoagulant  Patient never really took Eliquis.  Patient does not intend on taking Eliquis I do not think he needs it after resolution of his COVID.  Received Lovenox for DVT prevention for COVID  May benefit from Eliquis  post-COVID.     Alcohol intoxication, risk for withdrawal-->resolved  Generalized weakness  Parkinsonism   Treat as per CIWA scoring--> discontinued.  PT and OT.-->  Recommending TCU placement  Significant tremors noted.   He will need to see a neurologist down the road for his parkinsonism.  He has resting tremors.  These tremors are not related to withdrawal.     Slight elevation of ALT and AST  May be from chronic alcohol use or from COVID infection  No ascites or bleeding.  He is off the remdesivir     Generalized tremors  Parkinsonism  Not sure if this is from withdrawal.  We will do PT and OT.  Need outpatient evaluation by neurology.  Trial of Valium.     Acute hyponatremia, improved     Thrombocytopenia secondary to chronic alcohol intake     Bradycardia, hemodynamically stable  Holding beta blocker for now.  Also clonidine  Echocardiogram came back normal     Constipation  Give laxative as needed.     Hypertension, fairly controlled  risk of hypotension  Clonidine beta-blocker causing bradycardia  Blood pressure in the 150s-170s  Started amlodipine and lisinopril-optimize as clinically indicated.    Anxiety?  Tremor  Objective shortness of breath but otherwise chest x-ray and O2 sats are okay  Valium  Previous echocardiogram was unremarkable.  No wall motion abnormality.  Previous EKG was unremarkable.  Will get troponin.  Get ammonia level  Get a VBG     Consults on this case:  none     --------------------------------------------------------------------     Discharge disposition:  Discharge when TCU available  Diet: Combination Diet Regular Diet Adult  DVT Prophylaxis: Lovenox right now.  Previously on Eliquis  Code Status: Full Code  Living situation: --     ----------------------------------------------------------------------     Cumulative essential/pertinent data reviewed:  Labs:  Potassium 3.8  Last metabolic panel was on 1/8/2022.  Unremarkable.  Renal function normal.  Magnesium normal  Glucose  171  Hemoglobin 14.8  Platelet 163--> 120  LFTs slightly elevated ALT 77, AST 46  CRP down to 0.8  Renal function normal  CBC unremarkable     Imaging:  Repeat chest x-ray:                                                                 IMPRESSION: Negative chest.  The lungs are clear and there are no pleural effusions. Normal heart size.    ----------------------------------------------------------------------    Interval History/Subjective:  Patient looks somewhat agitated and nervous.  He is almost like having air hunger.  Previous x-ray and O2 sats are okay.  No chest pain however.  Abdominal pain nausea vomiting.  Was reported that he was eating well.  He tends to be agitated.  He has essential tremors/parkinsonism with tremors noted in the extremities  Rest of review of systems unremarkable.  Patient denies any chest pain.    Physical Exam/Objective:  Temp:  [97.9  F (36.6  C)-98.3  F (36.8  C)] 98  F (36.7  C)  Pulse:  [74-84] 74  Resp:  [16-18] 16  BP: (145-161)/(65-86) 161/86  SpO2:  [79 %-96 %] 93 %    Body mass index is 40.68 kg/m .    GENERAL:   Looking somewhat agitated.  Gasping for breath.  However he looks also nervous.  Nurse reports that he acts like this whenever someone goes in but otherwise he is relaxed.;  appears comfortable, in no acute distress,   HEAD:  Normocephalic, without obvious abnormality   EYES:  Grossly normal   NOSE: Grossly normal   THROAT: Lips and mouth external: grossly normal,    NECK: No mass noted; no stiffness   BACK:      LUNGS:   Auscultation: Negative for wheezing; No crackles, symmetric chest rise on inhalation, respirations unlabored    CHEST WALL:  No tenderness or deformity   HEART:  Regular rate and rhythm, significant murmurs: Negative,   ABDOMEN:   Soft, non-tender, no masses, no peritoneal signs   EXTREMITIES:   No tenderness of the calves.  No significant ankle edema   SKIN:  see extremities   NEURO: no signs of acute stroke or change from prior state   PSYCH:  Behavior is baseline agitation.  He looks nervous and hyperventilating whenever there is a person inside the room.     Medications:   Personally Reviewed.    albuterol  2 puff Inhalation Q6H     amLODIPine  2.5 mg Oral Daily     enoxaparin ANTICOAGULANT  40 mg Subcutaneous Q24H     escitalopram  10 mg Oral QPM     folic acid  1 mg Oral Daily     lisinopril  2.5 mg Oral Daily     melatonin  5 mg Oral At Bedtime     [Held by provider] metoprolol tartrate  25 mg Oral BID     multivitamin w/minerals  1 tablet Oral Daily     polyethylene glycol  17 g Oral BID     senna-docusate  2 tablet Oral BID     sodium chloride (PF)  3 mL Intracatheter Q8H     Current Facility-Administered Medications   Medication Dose Route Frequency     acetaminophen  650 mg Oral Q6H PRN    Or     acetaminophen  650 mg Rectal Q6H PRN     benzonatate  100 mg Oral TID PRN     diazepam  2 mg Oral Q12H PRN     flumazenil  0.2 mg Intravenous q1 min prn     guaiFENesin-dextromethorphan  10 mL Oral Q4H PRN     hydrALAZINE  10 mg Intravenous Q4H PRN     lidocaine 4%   Topical Q1H PRN     lidocaine (buffered or not buffered)  0.1-1 mL Other Q1H PRN     - MEDICATION INSTRUCTIONS -   Does not apply Continuous PRN     melatonin  5 mg Oral QPM PRN     ondansetron  4 mg Oral Q6H PRN    Or     ondansetron  4 mg Intravenous Q6H PRN     sodium chloride (PF)  3 mL Intracatheter q1 min prn         Tylre Gutierrez MD  Hospitalist  [facility]  [facility]  Phone: #[phone]

## 2022-01-15 NOTE — PROVIDER NOTIFICATION
Txt paged Dr. Gutierrez 2:03 PM 01/15/22     David RN #[id number]  372: D.F.  CRITICAL LAB - CT PE (+) for emboli throughout both lungs per Radiologist. Please advise. Thanks!     Pending response     **ADDENDUM 2:05 PM 01/15/22 - Discussed with Dr. Matt Paulino MD to order Heparin gtt and adjust other orders as needed

## 2022-01-15 NOTE — PLAN OF CARE
Problem: Gas Exchange Impaired  Goal: Optimal Gas Exchange  Outcome: Improving   Remains on room air during daytime - using spirometer independently. Lungs with faint exp. Wheezing anteriorly .. Albuterol inhaler per MAR     Problem: Risk for Delirium  Goal: Improved Attention and Thought Clarity  Outcome: Improving   AAOx4 - pleasant and using call light appropriately.

## 2022-01-15 NOTE — PLAN OF CARE
Problem: Adult Inpatient Plan of Care  Goal: Plan of Care Review  Outcome: No Change  Flowsheets (Taken 1/15/2022 8320)  Outcome Summary: Requiring 1 L oxygen while asleep, otherwise vitally stable. Lung sounds diminished with expiratory wheezes. Scheduled inhaler administered with some improvement. Denies pain. Up with assist of 1 for transfers and ambulation. IV SL. On K protocol, recheck in am. Anticipated discharge to TCU pending placement. Will continue to monitor and follow plan of care.

## 2022-01-15 NOTE — PROGRESS NOTES
Case was discussed with interventional radiology and cardiology as well as supposed accepting hospitalist  (Dr Martini) over at the [facility].    In light of the patient being overall hemodynamically stable, normal BNP, normal troponin, and his history of pulmonary embolism that was not adequately treated because of the patient's noncompliance with his medication, this current presentation of RV strain is likely from chronic pulmonary embolism.  The team recommended that the patient can be safely observed over at [facility] as he is currently hemodynamically stable.  Continue anticoagulation, check his echocardiogram. Current AC may be adequate enough to stabilize the patient.    If the patient takes a turn for the worse and becomes hemodynamically unstable patient may be transferred over to the [facility] where the appropriate interventional radiology procedure can be performed.

## 2022-01-15 NOTE — PROGRESS NOTES
"[facility]  Transfer Triage Note    Date of call: 01/15/22  Time of call: 3:43 PM    Current Patient Location: [facility] ED  Current Level of Care: ED    Vitals: Temp: 98.2  F (36.8  C) Temp src: Oral BP: (!) 158/79 Pulse: 96   Resp: 24 SpO2: 93 % Height: 185.4 cm (6' 1\") Weight: 139.8 kg (308 lb 4.8 oz)  O2 Device: Nasal cannula at Oxygen Delivery: 2 LPM  Diagnosis: PE  Is COVID-19 a concern? No  Reason for requested transfer: Procedure can be done here and not at referring hospital   Isolation Needs: None    Outside Records: Available  Additional records may be faxed to [phone].    Transfer accepted: With PERT  Stability of Patient: Patient is at risk for instability, however patient requires urgent transfer and does not meet ICU criteria   Level of Care Needed: Med Surg  Telemetry Needed:  Cardiac Telemetry  Expected Time of Arrival for Transfer: 0-8 hours  Arrival Location:  [facility]    Recommendations for Management and Stabilization: Not needed    Additional Comments: Per Dr. Gutierrez and Dr. Everett (IR he is only at [place], but no beds available there) & Dr. Dashawn Zheng (IR resident at the ) & Dr. yessy gibbons to [facility] for possible thrombectomy. Patient has a history of PE in the diagnosted January 2021, did not take all anticoagulation. On admission to  on 1/1 he was acutely intoxicated and has had with drawl. On admission his DDimer was low, no CTA done at that time,rising DDimer 1/5. He started to get breathing changes, gasping for air, and agitation and therefore did a CTA.  Now he is hypoxic needing 2L, no chest pain.     - will activate the PERT team    Felicia Martini MD    "

## 2022-01-15 NOTE — PROGRESS NOTES
Pt with recent diagnosis of saddle PE with CT findings of right heart strain. Discussed the case with Dr. Gutierrez. Pt currently hemodynamically stable with low oxygen requirement. Given the imaging findings pt would be a candidate for mechanical thrombectomy. Pt is currently stable and is being anticoagulated. Conservative treatment would be acceptable at this time, however if the patient deteriorates hemodynamically or has an increased O2 requirement consideration can be made for catheter based mechanical thrombectomy. This would require that the patient be transferred from [facility], likely to [facility].     I will discuss this case with my IR colleague at [place] so that he is aware. Please contact [facility] IR if patient is expected to be transferred for purposes of thrombectomy.    Diaz Everett MD

## 2022-01-15 NOTE — CONSULTS
INTERVENTIONAL RADIOLOGY CONSULT    Patient is a 71 year old male with PMH of hepatitis C, obesity, hypertension, depression, and chronic pulmonary embolism 1 year ago who was admitted to [facility] 1/1/2022 with acute alcohol intoxication, COVID-19, and hypoxia.  Increased respiratory effort prompted a pulmonary arterial CTA which demonstrated a large saddle embolus with an increased RV/LV ratio.  PERT team was activated for consideration of next steps.    The 1/15/2022 CTA PE images were reviewed and compared to the PE study from 1/22/2021.  The prior study demonstrates a pulmonary embolus in the right main pulmonary artery.  The clot burden on today's exam is bilateral and significantly increas there is also evidence of right heart strain with an increased RV/LV ratio on today's exam, new compared to prior.    BNP and troponins were within normal limits, placing patient risk at intermediate-low with a chronic pulmonary embolus with a possible acute component.  Based on this risk stratification and conversation with cardiology, we agree with the recommendation for noninterventional treatment with anticoagulation.    If the patient's clinical status worsens and risk stratification increases, do not hesitate to reconsult interventional radiology for possible intervention.      Dashawn Diehl, DO  Diagnostic Radiology Resident  Pager [phone]  Call Pager for After Hours: [phone]

## 2022-01-16 ENCOUNTER — APPOINTMENT (OUTPATIENT)
Dept: ULTRASOUND IMAGING | Facility: CLINIC | Age: 72
DRG: 896 | End: 2022-01-16
Attending: INTERNAL MEDICINE
Payer: COMMERCIAL

## 2022-01-16 ENCOUNTER — APPOINTMENT (OUTPATIENT)
Dept: CARDIOLOGY | Facility: CLINIC | Age: 72
DRG: 896 | End: 2022-01-16
Attending: INTERNAL MEDICINE
Payer: COMMERCIAL

## 2022-01-16 LAB
ALBUMIN SERPL-MCNC: 3 G/DL (ref 3.5–5)
ALP SERPL-CCNC: 59 U/L (ref 45–120)
ALT SERPL W P-5'-P-CCNC: 100 U/L (ref 0–45)
ANION GAP SERPL CALCULATED.3IONS-SCNC: 9 MMOL/L (ref 5–18)
AST SERPL W P-5'-P-CCNC: 59 U/L (ref 0–40)
BILIRUB SERPL-MCNC: 0.7 MG/DL (ref 0–1)
BUN SERPL-MCNC: 13 MG/DL (ref 8–28)
CALCIUM SERPL-MCNC: 9 MG/DL (ref 8.5–10.5)
CHLORIDE BLD-SCNC: 101 MMOL/L (ref 98–107)
CO2 SERPL-SCNC: 25 MMOL/L (ref 22–31)
CREAT SERPL-MCNC: 0.79 MG/DL (ref 0.7–1.3)
ERYTHROCYTE [DISTWIDTH] IN BLOOD BY AUTOMATED COUNT: 13 % (ref 10–15)
GFR SERPL CREATININE-BSD FRML MDRD: >90 ML/MIN/1.73M2
GLUCOSE BLD-MCNC: 127 MG/DL (ref 70–125)
HCT VFR BLD AUTO: 42.1 % (ref 40–53)
HGB BLD-MCNC: 14 G/DL (ref 13.3–17.7)
HOLD SPECIMEN: NORMAL
LVEF ECHO: NORMAL
MCH RBC QN AUTO: 33.5 PG (ref 26.5–33)
MCHC RBC AUTO-ENTMCNC: 33.3 G/DL (ref 31.5–36.5)
MCV RBC AUTO: 101 FL (ref 78–100)
PLATELET # BLD AUTO: 141 10E3/UL (ref 150–450)
POTASSIUM BLD-SCNC: 4.1 MMOL/L (ref 3.5–5)
POTASSIUM BLD-SCNC: 4.1 MMOL/L (ref 3.5–5)
PROT SERPL-MCNC: 7.2 G/DL (ref 6–8)
RBC # BLD AUTO: 4.18 10E6/UL (ref 4.4–5.9)
SODIUM SERPL-SCNC: 135 MMOL/L (ref 136–145)
UFH PPP CHRO-ACNC: 0.29 IU/ML
UFH PPP CHRO-ACNC: 0.41 IU/ML
UFH PPP CHRO-ACNC: 0.47 IU/ML
WBC # BLD AUTO: 6.8 10E3/UL (ref 4–11)

## 2022-01-16 PROCEDURE — 250N000013 HC RX MED GY IP 250 OP 250 PS 637: Performed by: INTERNAL MEDICINE

## 2022-01-16 PROCEDURE — 250N000013 HC RX MED GY IP 250 OP 250 PS 637: Performed by: HOSPITALIST

## 2022-01-16 PROCEDURE — 36415 COLL VENOUS BLD VENIPUNCTURE: CPT | Performed by: INTERNAL MEDICINE

## 2022-01-16 PROCEDURE — 93306 TTE W/DOPPLER COMPLETE: CPT | Mod: 26 | Performed by: INTERNAL MEDICINE

## 2022-01-16 PROCEDURE — 85520 HEPARIN ASSAY: CPT | Performed by: INTERNAL MEDICINE

## 2022-01-16 PROCEDURE — 255N000002 HC RX 255 OP 636: Performed by: INTERNAL MEDICINE

## 2022-01-16 PROCEDURE — 99233 SBSQ HOSP IP/OBS HIGH 50: CPT | Performed by: INTERNAL MEDICINE

## 2022-01-16 PROCEDURE — 84132 ASSAY OF SERUM POTASSIUM: CPT | Performed by: INTERNAL MEDICINE

## 2022-01-16 PROCEDURE — 93970 EXTREMITY STUDY: CPT

## 2022-01-16 PROCEDURE — 120N000001 HC R&B MED SURG/OB

## 2022-01-16 PROCEDURE — 999N000208 ECHOCARDIOGRAM COMPLETE

## 2022-01-16 PROCEDURE — 250N000011 HC RX IP 250 OP 636: Performed by: INTERNAL MEDICINE

## 2022-01-16 PROCEDURE — 85027 COMPLETE CBC AUTOMATED: CPT | Performed by: INTERNAL MEDICINE

## 2022-01-16 RX ADMIN — FOLIC ACID 1 MG: 1 TABLET ORAL at 09:32

## 2022-01-16 RX ADMIN — LISINOPRIL 2.5 MG: 2.5 TABLET ORAL at 09:32

## 2022-01-16 RX ADMIN — PERFLUTREN 2.5 ML: 6.52 INJECTION, SUSPENSION INTRAVENOUS at 10:35

## 2022-01-16 RX ADMIN — AMLODIPINE BESYLATE 2.5 MG: 2.5 TABLET ORAL at 09:32

## 2022-01-16 RX ADMIN — DIAZEPAM 2 MG: 2 TABLET ORAL at 02:04

## 2022-01-16 RX ADMIN — HEPARIN SODIUM 1950 UNITS/HR: 10000 INJECTION, SOLUTION INTRAVENOUS at 11:32

## 2022-01-16 RX ADMIN — ALBUTEROL SULFATE 2 PUFF: 90 AEROSOL, METERED RESPIRATORY (INHALATION) at 01:07

## 2022-01-16 RX ADMIN — Medication 5 MG: at 20:25

## 2022-01-16 RX ADMIN — ALBUTEROL SULFATE 2 PUFF: 90 AEROSOL, METERED RESPIRATORY (INHALATION) at 15:25

## 2022-01-16 RX ADMIN — ALBUTEROL SULFATE 2 PUFF: 90 AEROSOL, METERED RESPIRATORY (INHALATION) at 09:32

## 2022-01-16 RX ADMIN — ESCITALOPRAM OXALATE 10 MG: 10 TABLET ORAL at 20:25

## 2022-01-16 RX ADMIN — ALBUTEROL SULFATE 2 PUFF: 90 AEROSOL, METERED RESPIRATORY (INHALATION) at 20:25

## 2022-01-16 RX ADMIN — HEPARIN SODIUM 1950 UNITS/HR: 10000 INJECTION, SOLUTION INTRAVENOUS at 23:32

## 2022-01-16 RX ADMIN — MULTIPLE VITAMINS W/ MINERALS TAB 1 TABLET: TAB at 09:32

## 2022-01-16 ASSESSMENT — ACTIVITIES OF DAILY LIVING (ADL)
ADLS_ACUITY_SCORE: 12

## 2022-01-16 ASSESSMENT — MIFFLIN-ST. JEOR: SCORE: 2237.72

## 2022-01-16 NOTE — PROVIDER NOTIFICATION
Txt paged Dr. Gutierrez 3:11 PM 01/16/22     David RN #[id number]  372: D.F.  BLE US (+) POSITIVE for DVTs .. Also, AM labs resulted. Thanks!     Pending response     **ADDENDUM 3:30 PM 01/16/22 - Discussed with Dr. Gutierrez - MD spoke with Radiology - no new orders at this time

## 2022-01-16 NOTE — PROGRESS NOTES
[facility] MEDICINE PROGRESS NOTE      Length of stay: Day # 16    dentification/Summary: Mesfin Lee is a 71 year old male with a past medical history of alcohol abuse, essential hypertension  who was admitted with chief complaint of alcohol intoxication, difficulty with walking on 12/31/2021.     Admitting impression of COVID-19 infection     Brief history: He was out at the bar drinking and was brought to the ER via ambulance because of difficulty walking.  Denies any prior hospitalization for withdrawal or excessive drinking.  Patient apparently has had a history of withdrawals.  ER course: Was afebrile and vital signs stable.  COVID test was positive.  CT of the head negative for acute finding.  Chest x-ray showed chronic degenerative changes and compression deformities.  States he was vaccinated twice.     Assessment and Plan:    Saddle pulmonary embolus, hemodynamically stable with RV strain  History of PE 1 year ago but did not comply with anticoagulant  Hypoxia with 4 L now down to 2 L oxygen.  Started on IV heparin infusion.  Please see previous notes regarding my discussion with interventional radiology, cardiology, pulmonology, hospitalist at the [facility].  Patient never really took Eliquis.  Patient does not intend on taking Eliquis I do not think he needs it after resolution of his COVID.  Received Lovenox for DVT prevention for COVID--> currently heparin infusion  We will need to change him to DOAC such as Eliquis or Xarelto for therapeutic dose of treatment of pulmonary embolism upon discharge.  Patient has no significant edema but will do venous duplex to determine thrombosis load.  IVC filter is an option if there is a high load  Echocardiogram completed and pending at this time    COVID 19 infection, previously vaccinated x2, radiographically resolved  Acute respiratory failure with hypoxia, resolved, off oxygen.  Tested positive on January 1, 2022  Considered  recovered from COVID  Treated with Decadron, remdesivir on this admission  CRP improving.  Lovenox for DVT prevention.  Following platelets--> I do not think he needs Eliquis upon discharge.  Currently on room air but needing oxygen at bedtime  Has underlying bronchospasm as well. Received albuterol.  Still has post-COVID shortness of breath. Plan is for TCU placement.  He is not ready for booster vaccination.  He still symptomatic in terms of respiratory function.  Repeat chest x-ray shows negative chest.  The lungs are clear and there are no pleural effusions.  Normal heart size.     Alcohol intoxication, risk for withdrawal-->resolved  Generalized weakness  Parkinsonism   Treat as per CIWA scoring--> discontinued.  PT and OT.-->  Recommending TCU placement  Significant tremors noted.   He will need to see a neurologist down the road for his parkinsonism.  He has resting tremors.  These tremors are not related to withdrawal.     Slight elevation of ALT and AST  May be from chronic alcohol use or from COVID infection  No ascites or bleeding.  He is off the remdesivir     Generalized tremors  Parkinsonism  Not sure if this is from withdrawal.  We will do PT and OT.  Need outpatient evaluation by neurology.  Trial of Valium.     Acute hyponatremia, improved     Thrombocytopenia secondary to chronic alcohol intake     Bradycardia, hemodynamically stable  Holding beta blocker for now.  Also clonidine  Echocardiogram came back normal     Constipation  Give laxative as needed.     Hypertension, fairly controlled  risk of hypotension  Clonidine beta-blocker causing bradycardia  Blood pressure in the 150s-170s  Started amlodipine and lisinopril-optimize as clinically indicated.     Anxiety?  Tremor  Objective shortness of breath but otherwise chest x-ray and O2 sats are okay  Valium  Previous echocardiogram was unremarkable.  No wall motion abnormality.  Previous EKG was unremarkable.  Will get troponin.  Get ammonia  level  Get a [facility]     Consults on this case:  none     --------------------------------------------------------------------     Discharge disposition:  Discharge when TCU available  Diet: Combination Diet Regular Diet Adult  DVT Prophylaxis: Lovenox right now.  Previously on Eliquis  Code Status: Full Code  Living situation: --     ----------------------------------------------------------------------     Cumulative essential/pertinent data reviewed:  Labs:  Potassium 3.8  Last metabolic panel was on 1/8/2022.  Unremarkable.  Renal function normal.  Magnesium normal  Glucose 171  Hemoglobin 14.8  Platelet 163--> 120  LFTs slightly elevated ALT 77, AST 46  CRP down to 0.8  Renal function normal  CBC unremarkable     Imaging:  Repeat chest x-ray:                                                                 IMPRESSION: Negative chest.  The lungs are clear and there are no pleural effusions. Normal heart size.    CTA to rule out PE shows the following:  IMPRESSION:  1.  Severe pulmonary emboli including large saddle embolus with findings suggestive of right heart strain.  2.  Probable subtle developing pulmonary infarcts right middle lobe and right lower lobe peripherally.  3.  Report called at 1:58 to the patient's nurseDavid. They will immediately relay results to ordering physician.    ----------------------------------------------------------------------    Interval History/Subjective:  Patient still short of breath.  However his oxygen needs have decreased to 2 L/min.  Patient has a chronic agitation and nervousness.  Denies any chest pressure at this time.  No abdominal pain.  Blood pressure overall has been stable.  Rest of review of systems unremarkable.    Physical Exam/Objective:  Temp:  [98.1  F (36.7  C)-98.8  F (37.1  C)] 98.3  F (36.8  C)  Pulse:  [] 70  Resp:  [20-26] 22  BP: (105-167)/(76-89) 105/76  SpO2:  [88 %-94 %] 94 %    Body mass index is 41.41 kg/m .    GENERAL:   Somewhat depressed and  anxious and agitated.  Verbally conversant.  He says he is feeling slightly better.;  appears comfortable, in no acute distress,   HEAD:  Normocephalic, without obvious abnormality   EYES:  Grossly normal;    NOSE: Grossly normal   THROAT: Lips and mouth external: grossly normal,    NECK: No mass noted; no stiffness   BACK:      LUNGS:   Auscultation: Negative for wheezing; No crackles, symmetric chest rise on inhalation, respirations unlabored   CHEST WALL:  No tenderness or deformity   HEART:  Regular rate and rhythm, significant murmurs: Negative,   ABDOMEN:   Soft, non-tender, no masses, no peritoneal signs   EXTREMITIES:   No tenderness of the calves.  No significant ankle edema   SKIN:  see extremities   NEURO: no signs of acute stroke or change from prior state   PSYCH: Behavior is appropriate     Medications:   Personally Reviewed.    albuterol  2 puff Inhalation Q6H     amLODIPine  2.5 mg Oral Daily     escitalopram  10 mg Oral QPM     folic acid  1 mg Oral Daily     lisinopril  2.5 mg Oral Daily     melatonin  5 mg Oral At Bedtime     [Held by provider] metoprolol tartrate  25 mg Oral BID     multivitamin w/minerals  1 tablet Oral Daily     polyethylene glycol  17 g Oral BID     senna-docusate  2 tablet Oral BID     sodium chloride (PF)  3 mL Intracatheter Q8H     Current Facility-Administered Medications   Medication Dose Route Frequency     acetaminophen  650 mg Oral Q6H PRN    Or     acetaminophen  650 mg Rectal Q6H PRN     benzonatate  100 mg Oral TID PRN     diazepam  2 mg Oral Q12H PRN     flumazenil  0.2 mg Intravenous q1 min prn     guaiFENesin-dextromethorphan  10 mL Oral Q4H PRN     hydrALAZINE  10 mg Intravenous Q4H PRN     lidocaine 4%   Topical Q1H PRN     lidocaine (buffered or not buffered)  0.1-1 mL Other Q1H PRN     - MEDICATION INSTRUCTIONS -   Does not apply Continuous PRN     melatonin  5 mg Oral QPM PRN     ondansetron  4 mg Oral Q6H PRN    Or     ondansetron  4 mg Intravenous Q6H PRN      sodium chloride (PF)  3 mL Intracatheter q1 min prn           Tyler Gutierrez MD  [facility]  Phone: #[phone]

## 2022-01-16 NOTE — PLAN OF CARE
Pt is still on a heparin drip at 18ml/hr and is hemodynamically stable as this morning. Pt had an episode of SOB and was so congested but was able to clear out a lot of sputum and reported feeling much better after. Pt was then so anxious and prn Valium was given and was effective. Requiring 3L of supplemental O2 and denies pain at his time. Tele reading was SR with BBB and prolonged QT. Will continue to monitor.

## 2022-01-16 NOTE — PROGRESS NOTES
Care Management Follow Up    Length of Stay (days): 15    Expected Discharge Date: 01/17/2022     Concerns to be Addressed:       Patient plan of care discussed at interdisciplinary rounds: Yes    Anticipated Discharge Disposition: TCU pending    Additional Information:  CM called TCUs over the weekend. CM unable to find placement due to limited weekend TCU staff and beds.     CM called and reached voice mails     Rosetta Saldaña and Percy  [facility]    PETER was able to speak with Loni at [facility] and was requested to call back on Monday as needed.     MEGHAN Waterman

## 2022-01-16 NOTE — PLAN OF CARE
0700 - 1900 Progress Note:    Problem: Gas Exchange Impaired  Goal: Optimal Gas Exchange  Outcome: Declining   CT PE resulted with emboli (refer results) and Heparin gtt bolus and infusion initiated per Dr. Gutierrez.  Pt noted to be hypoxic and now requiring 2L O2 to maintain sat > 89% at rest.  Remains dyspneic with exertion. Encouraging spirometer. Lungs dim with faint expiratory wheezes throughout. LLL with coarse crackles.  Scheduled albuterol inhaler per MAR. Transfer to [facility] cancelled per MD's as patient remains hemodynamically stable but to notify S if condition worsens. Both IR and Cardiology reviewed patient's chart and placed recommendations.     Problem: Anxiety  Goal: Anxiety Reduction or Resolution  Outcome: Improving  Mod-sev anxiety post CT PE results. Received PO Valium and Ativan per MAR with relief noted.

## 2022-01-16 NOTE — PROGRESS NOTES
Radiology called me. DVT in both legs, but low clot burden distally, likely most of it already embolized to lungs.      Labs reviewed. CBC and CMP stable.

## 2022-01-17 ENCOUNTER — APPOINTMENT (OUTPATIENT)
Dept: OCCUPATIONAL THERAPY | Facility: CLINIC | Age: 72
DRG: 896 | End: 2022-01-17
Payer: COMMERCIAL

## 2022-01-17 ENCOUNTER — APPOINTMENT (OUTPATIENT)
Dept: PHYSICAL THERAPY | Facility: CLINIC | Age: 72
DRG: 896 | End: 2022-01-17
Payer: COMMERCIAL

## 2022-01-17 ENCOUNTER — TELEPHONE (OUTPATIENT)
Dept: OTHER | Age: 72
End: 2022-01-17

## 2022-01-17 DIAGNOSIS — I27.20 PULMONARY HYPERTENSION (H): Primary | ICD-10-CM

## 2022-01-17 DIAGNOSIS — R06.02 SOB (SHORTNESS OF BREATH): ICD-10-CM

## 2022-01-17 LAB
ATRIAL RATE - MUSE: 97 BPM
DIASTOLIC BLOOD PRESSURE - MUSE: NORMAL MMHG
HOLD SPECIMEN: NORMAL
INTERPRETATION ECG - MUSE: NORMAL
P AXIS - MUSE: 63 DEGREES
POTASSIUM BLD-SCNC: 4.3 MMOL/L (ref 3.5–5)
PR INTERVAL - MUSE: 184 MS
QRS DURATION - MUSE: 158 MS
QT - MUSE: 398 MS
QTC - MUSE: 505 MS
R AXIS - MUSE: 10 DEGREES
SYSTOLIC BLOOD PRESSURE - MUSE: NORMAL MMHG
T AXIS - MUSE: 11 DEGREES
UFH PPP CHRO-ACNC: 0.37 IU/ML
VENTRICULAR RATE- MUSE: 97 BPM

## 2022-01-17 PROCEDURE — 250N000013 HC RX MED GY IP 250 OP 250 PS 637: Performed by: INTERNAL MEDICINE

## 2022-01-17 PROCEDURE — 99233 SBSQ HOSP IP/OBS HIGH 50: CPT | Performed by: INTERNAL MEDICINE

## 2022-01-17 PROCEDURE — 85520 HEPARIN ASSAY: CPT | Performed by: INTERNAL MEDICINE

## 2022-01-17 PROCEDURE — 97535 SELF CARE MNGMENT TRAINING: CPT | Mod: GO

## 2022-01-17 PROCEDURE — 97116 GAIT TRAINING THERAPY: CPT | Mod: GP

## 2022-01-17 PROCEDURE — 36415 COLL VENOUS BLD VENIPUNCTURE: CPT | Performed by: HOSPITALIST

## 2022-01-17 PROCEDURE — 120N000001 HC R&B MED SURG/OB

## 2022-01-17 PROCEDURE — 250N000013 HC RX MED GY IP 250 OP 250 PS 637: Performed by: HOSPITALIST

## 2022-01-17 PROCEDURE — 250N000011 HC RX IP 250 OP 636: Performed by: INTERNAL MEDICINE

## 2022-01-17 PROCEDURE — 84132 ASSAY OF SERUM POTASSIUM: CPT | Performed by: HOSPITALIST

## 2022-01-17 RX ADMIN — FOLIC ACID 1 MG: 1 TABLET ORAL at 10:03

## 2022-01-17 RX ADMIN — HEPARIN SODIUM 1950 UNITS/HR: 10000 INJECTION, SOLUTION INTRAVENOUS at 13:13

## 2022-01-17 RX ADMIN — ALBUTEROL SULFATE 2 PUFF: 90 AEROSOL, METERED RESPIRATORY (INHALATION) at 10:04

## 2022-01-17 RX ADMIN — ALBUTEROL SULFATE 2 PUFF: 90 AEROSOL, METERED RESPIRATORY (INHALATION) at 20:57

## 2022-01-17 RX ADMIN — ALBUTEROL SULFATE 2 PUFF: 90 AEROSOL, METERED RESPIRATORY (INHALATION) at 13:24

## 2022-01-17 RX ADMIN — APIXABAN 10 MG: 5 TABLET, FILM COATED ORAL at 21:01

## 2022-01-17 RX ADMIN — ESCITALOPRAM OXALATE 10 MG: 10 TABLET ORAL at 20:57

## 2022-01-17 RX ADMIN — MULTIPLE VITAMINS W/ MINERALS TAB 1 TABLET: TAB at 10:03

## 2022-01-17 RX ADMIN — LISINOPRIL 2.5 MG: 2.5 TABLET ORAL at 10:03

## 2022-01-17 RX ADMIN — Medication 5 MG: at 20:57

## 2022-01-17 RX ADMIN — AMLODIPINE BESYLATE 2.5 MG: 2.5 TABLET ORAL at 10:03

## 2022-01-17 ASSESSMENT — ACTIVITIES OF DAILY LIVING (ADL)
ADLS_ACUITY_SCORE: 12
ADLS_ACUITY_SCORE: 12
ADLS_ACUITY_SCORE: 15
ADLS_ACUITY_SCORE: 15
ADLS_ACUITY_SCORE: 12
ADLS_ACUITY_SCORE: 12
ADLS_ACUITY_SCORE: 13
ADLS_ACUITY_SCORE: 12
ADLS_ACUITY_SCORE: 13
ADLS_ACUITY_SCORE: 12
ADLS_ACUITY_SCORE: 12
ADLS_ACUITY_SCORE: 15
ADLS_ACUITY_SCORE: 12
ADLS_ACUITY_SCORE: 12
ADLS_ACUITY_SCORE: 13
ADLS_ACUITY_SCORE: 15
ADLS_ACUITY_SCORE: 12
ADLS_ACUITY_SCORE: 12

## 2022-01-17 NOTE — PLAN OF CARE
0300  - 1930 Progress Note:       Problem: Gas Exchange Impaired  Goal: Optimal Gas Exchange  Outcome: No Change   Remains on 2L O2 to maintain sat > 90% at rest.  Lungs diminished throughout and using spirometer independently.  Remains short of breathe with activity.       Problem: Adult Inpatient Plan of Care  Goal: Absence of Hospital-Acquired Illness or Injury  Intervention: Prevent and Manage VTE (Venous Thromboembolism) Risk   Heparin gtt continues with next Anti-Xa redraw @ 2045 .. BLE US resulted with DVTs - MD aware and no new orders

## 2022-01-17 NOTE — PLAN OF CARE
Problem: Adult Inpatient Plan of Care  Goal: Optimal Comfort and Wellbeing  Outcome: Improving     Patient continues to use 2 L via NC to keep saturations >90%. Lung sounds are coarse and dyspnea with exertion present. Anti-Xa redraw at 0.37, no rate change per protocol for continuous Heparin. Assistance needed for ambulation d/t tremors and weakness. Numbness present in lower extremities. VSS.

## 2022-01-17 NOTE — PROGRESS NOTES
CLINICAL NUTRITION SERVICES    Reviewed nutrition risk factors due to LOS. Pt is tolerating diet, eating well per nursing documentation. No nutrition issues identified at this time. RD will follow via LOS at this time, unless consulted.

## 2022-01-17 NOTE — TELEPHONE ENCOUNTER
[facility] Call Center    Phone Message    May a detailed message be left on voicemail: yes     Reason for Call: Appointment Intake    Referring Provider Name: Simon Laguerre MD  Diagnosis and/or Symptoms: Pulmonary Hypertension    Action Taken: Message routed to:  [facility] ([facility] cardio    Travel Screening: Not Applicable

## 2022-01-17 NOTE — LETTER
February 10, 2022      TO: Mesfin Lee  [address] [address]         Dear Mesfin,    The Pulmonary Hypertension clinic has been trying to reach you to get scheduled with Dr. Dong. You were referred to see us after your last hospital stay. Please give [phone] Option 1 a call to get scheduled for a new PH appointment with Dr. Dong. We would like you to have labs, a 6 minute walk test, and a pulmonary function test completed the same day.    Thank you and we hope to see you soon!     Sincerely,    Tana Sharma RNCC

## 2022-01-17 NOTE — PLAN OF CARE
Pt A/Ox4, no c/o anxiety overnight, slept well. VSS on 2L O2 via NC. Tele NSR with BBB and prolonged QT. Denies pain. Urinal voiding. Anti xa 0.41 a 2045, 2nd stable result so recheck will be this AM. Heparin drip running at 1950 unit(s)/hr or 19.5mls/hr.   Problem: Gas Exchange Impaired  Goal: Optimal Gas Exchange  Outcome: No Change     Problem: Anxiety  Goal: Anxiety Reduction or Resolution  Outcome: Improving

## 2022-01-17 NOTE — PROGRESS NOTES
[facility] MEDICINE PROGRESS NOTE      Length of stay: Day # 17    Identification/Summary: Mesfin Lee is a 71 year old male with a past medical history of alcohol abuse, essential hypertension  who was admitted with chief complaint of alcohol intoxication, difficulty with walking on 12/31/2021.     Admitting impression of COVID-19 infection     Brief history: He was out at the bar drinking and was brought to the ER via ambulance because of difficulty walking.  Denies any prior hospitalization for withdrawal or excessive drinking.  Patient apparently has had a history of withdrawals.  ER course: Was afebrile and vital signs stable.  COVID test was positive.  CT of the head negative for acute finding.  Chest x-ray showed chronic degenerative changes and compression deformities.  States he was vaccinated twice.     Assessment and Plan:     Saddle pulmonary embolus, hemodynamically stable with RV strain; most likely of acute on chronic; with significant RV strain  Bilateral DVT of the lower extremities with residual small amount distally.  History of PE 1 year ago but did not comply with anticoagulant  Hypoxia with 4 L now down to 2 L oxygen.  Started on IV heparin infusion.  Please see previous notes regarding my discussion with interventional radiology, cardiology, pulmonology, hospitalist at the [place].  Patient never really took Eliquis.  Patient does not intend on taking Eliquis I do not think he needs it after resolution of his COVID.  Received Lovenox for DVT prevention for COVID--> currently heparin infusion  We will need to change him to DOAC such as Eliquis or Xarelto for therapeutic dose of treatment of pulmonary embolism upon discharge.  Patient has no significant edema but will do venous duplex to determine thrombosis load.  IVC filter is an option if there is a high load  Echocardiogram shows mildly dilated.  Mildly decreased right ventricular systolic function.  LVEF  is 60 to 65%.  Duplex of the lower extremities shows only distal residual DVTs.  No significant clot burden left.    COVID 19 infection, previously vaccinated x2, radiographically resolved  Acute respiratory failure with hypoxia, resolved, off oxygen.  Tested positive on January 1, 2022  Considered recovered from COVID  Treated with Decadron, remdesivir on this admission; completed.  CRP improving.  Lovenox for DVT prevention.  Following platelets--> I do not think he needs Eliquis upon discharge.  Currently on room air but needing oxygen at bedtime  Has underlying bronchospasm as well. Received albuterol.  Still has post-COVID shortness of breath. Plan is for TCU placement.  He is not ready for booster vaccination.  He still symptomatic in terms of respiratory function.  Repeat chest x-ray shows negative chest.  The lungs are clear and there are no pleural effusions.  Normal heart size.     Alcohol intoxication, risk for withdrawal-->resolved  Generalized weakness  Parkinsonism   Treat as per CIWA scoring--> discontinued.  PT and OT.-->  Recommending TCU placement  Significant tremors noted.   He will need to see a neurologist down the road for his parkinsonism.  He has resting tremors.  These tremors are not related to withdrawal.     Slight elevation of ALT and AST  May be from chronic alcohol use or from COVID infection  No ascites or bleeding.  He is off the remdesivir     Generalized tremors  Parkinsonism  Not sure if this is from withdrawal.  We will do PT and OT.  Need outpatient evaluation by neurology.  Trial of Valium.     Acute hyponatremia, improved     Thrombocytopenia secondary to chronic alcohol intake     Bradycardia, hemodynamically stable  Holding beta blocker for now.  Also clonidine  Echocardiogram came back normal     Constipation  Give laxative as needed.     Hypertension, fairly controlled  risk of hypotension  Clonidine beta-blocker causing bradycardia  Blood pressure in the  150s-170s  Started amlodipine and lisinopril-optimize as clinically indicated.     Anxiety?  Tremor  Objective shortness of breath but otherwise chest x-ray and O2 sats are okay  Valium  Previous echocardiogram was unremarkable.  No wall motion abnormality.  Previous EKG was unremarkable.  Will get troponin.  Get ammonia level  Get a VBG     Consults on this case:  none     --------------------------------------------------------------------     Discharge disposition:  Discharge when TCU available  Diet: Combination Diet Regular Diet Adult  DVT Prophylaxis: IV heparin infusion.-->  Change to apixaban soon, ask pharmacy for initiation and scheduling of this medication  Code Status: Full Code  Living situation: --     ----------------------------------------------------------------------     Cumulative essential/pertinent data reviewed:  Labs:  Potassium 3.8  Last metabolic panel was on 1/8/2022.  Unremarkable.  Renal function normal.  Magnesium normal  Glucose 171  Hemoglobin 14.8  Platelet 163--> 120  LFTs slightly elevated ALT 77, AST 46  CRP down to 0.8  Renal function normal  CBC unremarkable     Imaging:  Repeat chest x-ray:                                                                 IMPRESSION: Negative chest.  The lungs are clear and there are no pleural effusions. Normal heart size.     CTA to rule out PE shows the following:  IMPRESSION:  1.  Severe pulmonary emboli including large saddle embolus with findings suggestive of right heart strain.  2.  Probable subtle developing pulmonary infarcts right middle lobe and right lower lobe peripherally.  3.  Report called at 1:58 to the patient's nurseDavid. They will immediately relay results to ordering physician.      ----------------------------------------------------------------------    Interval History/Subjective:  Still having significant exertional dyspnea.  Denies any chest pressure however.  His echocardiogram was reviewed.  Denies abdominal pain.   No nausea or vomiting.  Patient lives alone.  Patient denies any frequent falls which would be a contraindication for anticoagulation.  Rest of review of systems unremarkable.    Physical Exam/Objective:  Temp:  [97.9  F (36.6  C)-99.4  F (37.4  C)] 98.2  F (36.8  C)  Pulse:  [60-97] 60  Resp:  [20-22] 21  BP: (133-176)/() 139/70  SpO2:  [92 %-99 %] 92 %    Body mass index is 41.41 kg/m .    GENERAL:   Looking slightly better than yesterday.  He is saturating adequately with 2 L.  He has had habit of gasping for air however.;  appears comfortable, in no acute distress,   HEAD:  Normocephalic, without obvious abnormality   EYES:  Grossly normal;    NOSE: Grossly normal   THROAT: Lips and mouth external: grossly normal,    NECK: No mass noted; no stiffness   BACK:      LUNGS:   Auscultation: Negative for wheezing; No crackles, symmetric chest rise on inhalation, respirations unlabored   CHEST WALL:  No tenderness or deformity   HEART:  Regular rate and rhythm, significant murmurs: Negative   ABDOMEN:   Soft, non-tender, no masses, no peritoneal signs   EXTREMITIES:   No tenderness of the calves.  No significant ankle edema   SKIN:  see extremities   NEURO: no signs of acute stroke or change from prior state; he has some involuntary tremors.   PSYCH: Behavior is appropriate     Medications:   Personally Reviewed.    albuterol  2 puff Inhalation Q6H     amLODIPine  2.5 mg Oral Daily     escitalopram  10 mg Oral QPM     folic acid  1 mg Oral Daily     lisinopril  2.5 mg Oral Daily     melatonin  5 mg Oral At Bedtime     [Held by provider] metoprolol tartrate  25 mg Oral BID     multivitamin w/minerals  1 tablet Oral Daily     polyethylene glycol  17 g Oral BID     senna-docusate  2 tablet Oral BID     sodium chloride (PF)  3 mL Intracatheter Q8H     Current Facility-Administered Medications   Medication Dose Route Frequency     acetaminophen  650 mg Oral Q6H PRN    Or     acetaminophen  650 mg Rectal Q6H PRN      benzonatate  100 mg Oral TID PRN     diazepam  2 mg Oral Q12H PRN     flumazenil  0.2 mg Intravenous q1 min prn     guaiFENesin-dextromethorphan  10 mL Oral Q4H PRN     hydrALAZINE  10 mg Intravenous Q4H PRN     lidocaine 4%   Topical Q1H PRN     lidocaine (buffered or not buffered)  0.1-1 mL Other Q1H PRN     - MEDICATION INSTRUCTIONS -   Does not apply Continuous PRN     melatonin  5 mg Oral QPM PRN     ondansetron  4 mg Oral Q6H PRN    Or     ondansetron  4 mg Intravenous Q6H PRN     sodium chloride (PF)  3 mL Intracatheter q1 min prn           Tyler Gutierrez MD  [facility]  Phone: #[phone]

## 2022-01-18 ENCOUNTER — APPOINTMENT (OUTPATIENT)
Dept: PHYSICAL THERAPY | Facility: CLINIC | Age: 72
DRG: 896 | End: 2022-01-18
Payer: COMMERCIAL

## 2022-01-18 ENCOUNTER — APPOINTMENT (OUTPATIENT)
Dept: OCCUPATIONAL THERAPY | Facility: CLINIC | Age: 72
DRG: 896 | End: 2022-01-18
Payer: COMMERCIAL

## 2022-01-18 LAB — POTASSIUM BLD-SCNC: 4.5 MMOL/L (ref 3.5–5)

## 2022-01-18 PROCEDURE — 250N000013 HC RX MED GY IP 250 OP 250 PS 637: Performed by: FAMILY MEDICINE

## 2022-01-18 PROCEDURE — 120N000001 HC R&B MED SURG/OB

## 2022-01-18 PROCEDURE — 84132 ASSAY OF SERUM POTASSIUM: CPT | Performed by: INTERNAL MEDICINE

## 2022-01-18 PROCEDURE — 250N000013 HC RX MED GY IP 250 OP 250 PS 637: Performed by: HOSPITALIST

## 2022-01-18 PROCEDURE — 250N000013 HC RX MED GY IP 250 OP 250 PS 637: Performed by: INTERNAL MEDICINE

## 2022-01-18 PROCEDURE — 97535 SELF CARE MNGMENT TRAINING: CPT | Mod: GO

## 2022-01-18 PROCEDURE — 99233 SBSQ HOSP IP/OBS HIGH 50: CPT | Performed by: FAMILY MEDICINE

## 2022-01-18 PROCEDURE — 36415 COLL VENOUS BLD VENIPUNCTURE: CPT | Performed by: INTERNAL MEDICINE

## 2022-01-18 PROCEDURE — 97116 GAIT TRAINING THERAPY: CPT | Mod: GP

## 2022-01-18 RX ORDER — FUROSEMIDE 40 MG
40 TABLET ORAL DAILY
Status: DISCONTINUED | OUTPATIENT
Start: 2022-01-19 | End: 2022-01-19 | Stop reason: HOSPADM

## 2022-01-18 RX ORDER — LISINOPRIL 10 MG/1
10 TABLET ORAL DAILY
Status: DISCONTINUED | OUTPATIENT
Start: 2022-01-19 | End: 2022-01-19 | Stop reason: HOSPADM

## 2022-01-18 RX ORDER — HYDRALAZINE HYDROCHLORIDE 20 MG/ML
10 INJECTION INTRAMUSCULAR; INTRAVENOUS EVERY 6 HOURS PRN
Status: DISCONTINUED | OUTPATIENT
Start: 2022-01-18 | End: 2022-01-19 | Stop reason: HOSPADM

## 2022-01-18 RX ORDER — FUROSEMIDE 40 MG
40 TABLET ORAL ONCE
Status: COMPLETED | OUTPATIENT
Start: 2022-01-18 | End: 2022-01-18

## 2022-01-18 RX ORDER — CARBOXYMETHYLCELLULOSE SODIUM 5 MG/ML
2 SOLUTION/ DROPS OPHTHALMIC 4 TIMES DAILY PRN
Status: DISCONTINUED | OUTPATIENT
Start: 2022-01-18 | End: 2022-01-19 | Stop reason: HOSPADM

## 2022-01-18 RX ADMIN — DIAZEPAM 2 MG: 2 TABLET ORAL at 09:05

## 2022-01-18 RX ADMIN — ALBUTEROL SULFATE 2 PUFF: 90 AEROSOL, METERED RESPIRATORY (INHALATION) at 14:20

## 2022-01-18 RX ADMIN — AMLODIPINE BESYLATE 2.5 MG: 2.5 TABLET ORAL at 09:05

## 2022-01-18 RX ADMIN — APIXABAN 10 MG: 5 TABLET, FILM COATED ORAL at 09:05

## 2022-01-18 RX ADMIN — ESCITALOPRAM OXALATE 10 MG: 10 TABLET ORAL at 20:41

## 2022-01-18 RX ADMIN — Medication 5 MG: at 20:41

## 2022-01-18 RX ADMIN — ALBUTEROL SULFATE 2 PUFF: 90 AEROSOL, METERED RESPIRATORY (INHALATION) at 09:06

## 2022-01-18 RX ADMIN — ALBUTEROL SULFATE 2 PUFF: 90 AEROSOL, METERED RESPIRATORY (INHALATION) at 03:23

## 2022-01-18 RX ADMIN — LISINOPRIL 2.5 MG: 2.5 TABLET ORAL at 09:05

## 2022-01-18 RX ADMIN — Medication 100 MG: at 12:31

## 2022-01-18 RX ADMIN — MULTIPLE VITAMINS W/ MINERALS TAB 1 TABLET: TAB at 09:05

## 2022-01-18 RX ADMIN — ALBUTEROL SULFATE 2 PUFF: 90 AEROSOL, METERED RESPIRATORY (INHALATION) at 20:40

## 2022-01-18 RX ADMIN — APIXABAN 10 MG: 5 TABLET, FILM COATED ORAL at 20:40

## 2022-01-18 RX ADMIN — FUROSEMIDE 40 MG: 40 TABLET ORAL at 10:38

## 2022-01-18 RX ADMIN — FOLIC ACID 1 MG: 1 TABLET ORAL at 09:05

## 2022-01-18 RX ADMIN — Medication 2 DROP: at 12:28

## 2022-01-18 ASSESSMENT — ACTIVITIES OF DAILY LIVING (ADL)
ADLS_ACUITY_SCORE: 10
ADLS_ACUITY_SCORE: 9
ADLS_ACUITY_SCORE: 11
ADLS_ACUITY_SCORE: 9
ADLS_ACUITY_SCORE: 10
ADLS_ACUITY_SCORE: 11
ADLS_ACUITY_SCORE: 11
ADLS_ACUITY_SCORE: 10
ADLS_ACUITY_SCORE: 15
ADLS_ACUITY_SCORE: 9
ADLS_ACUITY_SCORE: 11
ADLS_ACUITY_SCORE: 9
ADLS_ACUITY_SCORE: 10
ADLS_ACUITY_SCORE: 10
ADLS_ACUITY_SCORE: 9
ADLS_ACUITY_SCORE: 15
ADLS_ACUITY_SCORE: 9

## 2022-01-18 ASSESSMENT — MIFFLIN-ST. JEOR: SCORE: 2226.38

## 2022-01-18 NOTE — PLAN OF CARE
Problem: Adult Inpatient Plan of Care  Goal: Plan of Care Review  Outcome: Improving  Flowsheets (Taken 1/18/2022 1254)  Plan of Care Reviewed With: patient  Progress: improving   Pt was able to be titrated down to 2L oxygen therapy via NC this shift.  Pt complained of sore ears from oxygen tubing, changed out NC for soft comfort brand and placed foam ear protectors on with good relief.  Continues to be SOB but was able to ambulate in halls with PT using walker.  Valium given this morning with good results managing anxiety and tremors well.  TELE reading NSR this shift.  Eye gtts ordered for complaints of dry, burning eyes with relief.      Plan: TCU when available.

## 2022-01-18 NOTE — PLAN OF CARE
"  Problem: Gas Exchange Impaired  Goal: Optimal Gas Exchange  Outcome: Improving     Problem: Adult Inpatient Plan of Care  Goal: Plan of Care Review  Outcome: Improving     NSR with BBB and PVCs on tele. On 3 liters oxygen. Scheduled Albuterol inhaler. Coarse lung sounds with occasional wheezing. Patient reports he \"feels like I need to catch my breath\". Bed alarm on for safety, patient calls appropriately.  "

## 2022-01-18 NOTE — PLAN OF CARE
"Pt is alert, oriented, utilizes call light appropriately for assist. Ambulates with A x 1 in room d/t weakness, tremors. Stopped IV heparin infusion and started PO apixiban regimen this evening. Pt utilizes urinal in bed for voiding, encouraged ambulation and PO fluid intake this shift. Pt declines to go for a walk around the unit this evening when asked by the RN. Pt continues 2-3 LPM O2 via NC to keep sats >92%. VSS. /62 (BP Location: Right arm, Patient Position: Semi-Forrester's)   Pulse 61   Temp 98  F (36.7  C) (Oral)   Resp 19   Ht 1.854 m (6' 1\")   Wt 142.4 kg (313 lb 14.4 oz)   SpO2 92%   BMI 41.41 kg/m      Tele is NSR with BBB; per K protocols to recheck K lab in AM. Plan is to transfer to TCU.  "

## 2022-01-18 NOTE — PROGRESS NOTES
Care Management Follow Up    Length of Stay (days): 17    Expected Discharge Date: 01/19/2022     Concerns to be Addressed:     Medical mgmt, placement  Patient plan of care discussed at interdisciplinary rounds: Yes    Anticipated Discharge Disposition:  TCU     Anticipated Discharge Services:    Anticipated Discharge DME:      Patient/family educated on Medicare website which has current facility and service quality ratings:  yes  Education Provided on the Discharge Plan:  yes  Patient/Family in Agreement with the Plan:  yes    Referrals Placed by CM/SW:    Private pay costs discussed: Not applicable    Additional Information:      Seeking TCU. Facilities currently reviewing are [facility], [facility], and [facility]. CM to F/u tomorrow.    Declined/Full TCUs are [facility], [facility], [facility], [facility], and [facility]

## 2022-01-18 NOTE — PROGRESS NOTES
[facility] MEDICINE PROGRESS NOTE      Identification/Summary: Mesfin Lee is a 71 year old male with a past medical history of alcohol abuse came with alcohol intoxication and difficulty ambulation found to have positive COVID.  He is vaccinated against COVID x2.  No booster yet.  Found to have saddle pulmonary embolism with hemodynamically stable RV strain.  Case was evaluated by IR.  Conservative treatment would be acceptable as patient remained hemodynamically stable. Received anticoagulation with IV heparin infusion initially now started Eliquis.  History of PE 1 year ago after rib fractures.  Will continue lifelong anticoagulation treatment.  Treated with Decadron and remdesivir for acute COVID infection.  Requiring 2 L of oxygen.  He has dyspnea with minimal exertion.  Started on gentle diuresis on 1/18.  Outpatient sleep study to rule out obstructive sleep apnea.  Absolute alcohol cessation is advised.   provided resources for CD treatment.  Started on lisinopril for hypertension.  Has essential tremors.  Follow-up with neurology as outpatient.    Assessment and Plan:  Saddle pulmonary embolism with RV strain  Chest CT-Severe pulmonary emboli including large saddle embolus with findings suggestive of right heart strain. Probable subtle developing pulmonary infarcts right middle lobe and right lower lobe peripherally  Echocardiogram-The right ventricle is mildly dilated. Mildly decreased right ventricular  systolic function  Leg ultrasound--Fragments of deep vein thrombosis in the distal left superficial femoral vein and popliteal vein appear to be causing occlusion of vessels in this location. Occlusive deep venous thrombosis in one of the paired right peroneal veins.  Remains hemodynamically stable  Anticoagulation started with IV heparin infusion and then DOAC  Remains hemodynamically stable, conservative treatment would be acceptable  History of PE 1 year ago  after rib fractures  Will be on lifelong anticoagulation treatment    Acute hypoxic respiratory failure  On 2 L of oxygen at present  Multifactorial etiology including pulmonary embolism, COVID infection, volume load    COVID infection  Vaccinated against COVID x2, no booster yet  Treated with remdesivir and Decadron  Lasix 40 mg x 1    Morbid obesity Body mass index is 41.08 kg/m .  Modification of lifestyle  Outpatient sleep study to rule out obstructive sleep apnea    Essential hypertension  Lisinopril 10 mg daily    Thrombocytopenia secondary to chronic alcohol use    Alcohol abuse and dependence  Alcohol intoxication  Absolute alcohol cessation is advised  Folic acid and thiamine supplement  Essential tremor/parkinsonism  Outpatient neurology follow-up    Depression and anxiety  Lexapro 10 mg daily    Gait instability  Generalized weakness and deconditioning  Will be discharging to TCU for more rehabilitation    Code full  DVT prophylaxis  Started on Eliquis  Barrier to discharge  Awaiting for TCU placement.  Anticipated discharge in 1 day    Savana Mcpherson MD  [facility]  [facility]  Phone: #[phone]    Interval History/Subjective:  Resting comfortably.  Dyspnea with minimal exertion.  On 2 L of oxygen at present.  Has dry eye.  Afebrile.    Review of system  Rest of the review of system negative except HPI.  Denies headache, chest pain, palpitation, nausea, vomiting, abdominal pain or urinary symptoms.    Physical Exam/Objective:  Temp:  [97.4  F (36.3  C)-98.2  F (36.8  C)] 98.1  F (36.7  C)  Pulse:  [60-83] 62  Resp:  [18-21] 20  BP: (124-156)/(60-80) 142/73  SpO2:  [88 %-96 %] 92 %  Body mass index is 41.08 kg/m .    GENERAL:  Alert, appears comfortable, in no acute distress, appears stated age, morbidly obese   HEAD:  Normocephalic, without obvious abnormality, atraumatic   EYES:  PERRL, conjunctiva  clear,  EOM's intact   NOSE: Nares normal,  mucosa  normal, no drainage   THROAT: Lips, mucosa,  gums normal, mouth moist   NECK: Supple, symmetrical, trachea midline   BACK:   Symmetric, no curvature, ROM normal   LUNGS:   Decreased bibasilar breath sound, no rales, rhonchi, or wheezing, symmetric chest rise on inhalation, respirations unlabored   CHEST WALL:  No tenderness or deformity   HEART:  Regular rate and rhythm, S1 and S2 normal, no murmur   ABDOMEN:   Soft, non-tender, bowel sounds active , no masses, no organomegaly   EXTREMITIES: 1 + BLE  edema    SKIN: No rashes   NEURO: Alert, oriented x3, moves all four extremities freely   PSYCH: Cooperative, behavior is appropriate      Data reviewed today: I personally reviewed all new medications, labs, imaging/diagnostics reports over the past 24 hours. Pertinent findings include:    Imaging:   Echocardiogram  1.Left ventricular size, wall motion and function are normal. The ejection  fraction is 60-65%.  2.The right ventricle is mildly dilated. Mildly decreased right ventricular  systolic function  3.Mild to moderate valvular aortic stenosis. Mean gradient 11 mmHg with peak  velocity of 2.1 m/s, however valve not well visualized on the study.  4.Compared to the prior study dated 1/10/2021, there have been no changes.  Both studies of very poor technical quality    Chest CT  1.  Severe pulmonary emboli including large saddle embolus with findings suggestive of right heart strain.  2.  Probable subtle developing pulmonary infarcts right middle lobe and right lower lobe peripherally.    Leg US  1.  Fragments of deep vein thrombosis in the distal left superficial femoral vein and popliteal vein appear to be causing occlusion of vessels in this location.  2.  Occlusive deep venous thrombosis in one of the paired right peroneal veins.    Labs:  Most Recent 3 CBC's:Recent Labs   Lab Test 01/16/22  1409 01/15/22  1525 01/13/22  0638   WBC 6.8 7.7 9.0   HGB 14.0 14.9 14.3   * 108* 101*   * 214 166     Most  Recent 3 BMP's:Recent Labs   Lab Test 01/18/22  0548 01/17/22  0657 01/16/22  1409 01/14/22  0604 01/13/22  0638 01/09/22  0709 01/08/22  0903   NA  --   --  135*  --  137  --  135*   POTASSIUM 4.5 4.3 4.1   < > 4.1   < > 4.0   CHLORIDE  --   --  101  --  104  --  98   CO2  --   --  25  --  25  --  27   BUN  --   --  13  --  22  --  19   CR  --   --  0.79  --  0.77  --  0.89   ANIONGAP  --   --  9  --  8  --  10   CLIF  --   --  9.0  --  8.9  --  9.1   GLC  --   --  127*  --  105  --  171*    < > = values in this interval not displayed.       Medications:   Personally Reviewed.  Medications     - MEDICATION INSTRUCTIONS -         albuterol  2 puff Inhalation Q6H     apixaban ANTICOAGULANT  10 mg Oral BID    Followed by     [START ON 1/23/2022] apixaban ANTICOAGULANT  5 mg Oral BID     escitalopram  10 mg Oral QPM     folic acid  1 mg Oral Daily     [START ON 1/19/2022] lisinopril  10 mg Oral Daily     melatonin  5 mg Oral At Bedtime     multivitamin w/minerals  1 tablet Oral Daily     polyethylene glycol  17 g Oral BID     senna-docusate  2 tablet Oral BID     sodium chloride (PF)  3 mL Intracatheter Q8H

## 2022-01-19 ENCOUNTER — APPOINTMENT (OUTPATIENT)
Dept: PHYSICAL THERAPY | Facility: CLINIC | Age: 72
DRG: 896 | End: 2022-01-19
Payer: COMMERCIAL

## 2022-01-19 ENCOUNTER — APPOINTMENT (OUTPATIENT)
Dept: OCCUPATIONAL THERAPY | Facility: CLINIC | Age: 72
DRG: 896 | End: 2022-01-19
Payer: COMMERCIAL

## 2022-01-19 VITALS
BODY MASS INDEX: 41.27 KG/M2 | DIASTOLIC BLOOD PRESSURE: 70 MMHG | SYSTOLIC BLOOD PRESSURE: 133 MMHG | HEART RATE: 82 BPM | HEIGHT: 73 IN | WEIGHT: 311.4 LBS | RESPIRATION RATE: 22 BRPM | TEMPERATURE: 98.5 F | OXYGEN SATURATION: 93 %

## 2022-01-19 LAB
ALBUMIN SERPL-MCNC: 3 G/DL (ref 3.5–5)
ALP SERPL-CCNC: 66 U/L (ref 45–120)
ALT SERPL W P-5'-P-CCNC: 83 U/L (ref 0–45)
ANION GAP SERPL CALCULATED.3IONS-SCNC: 5 MMOL/L (ref 5–18)
AST SERPL W P-5'-P-CCNC: 46 U/L (ref 0–40)
BILIRUB SERPL-MCNC: 0.6 MG/DL (ref 0–1)
BUN SERPL-MCNC: 11 MG/DL (ref 8–28)
CALCIUM SERPL-MCNC: 8.6 MG/DL (ref 8.5–10.5)
CHLORIDE BLD-SCNC: 103 MMOL/L (ref 98–107)
CO2 SERPL-SCNC: 27 MMOL/L (ref 22–31)
CREAT SERPL-MCNC: 0.8 MG/DL (ref 0.7–1.3)
GFR SERPL CREATININE-BSD FRML MDRD: >90 ML/MIN/1.73M2
GLUCOSE BLD-MCNC: 108 MG/DL (ref 70–125)
POTASSIUM BLD-SCNC: 4.3 MMOL/L (ref 3.5–5)
PROT SERPL-MCNC: 7.2 G/DL (ref 6–8)
SODIUM SERPL-SCNC: 135 MMOL/L (ref 136–145)

## 2022-01-19 PROCEDURE — 97535 SELF CARE MNGMENT TRAINING: CPT | Mod: GO

## 2022-01-19 PROCEDURE — 80053 COMPREHEN METABOLIC PANEL: CPT | Performed by: FAMILY MEDICINE

## 2022-01-19 PROCEDURE — 97116 GAIT TRAINING THERAPY: CPT | Mod: GP

## 2022-01-19 PROCEDURE — 250N000013 HC RX MED GY IP 250 OP 250 PS 637: Performed by: INTERNAL MEDICINE

## 2022-01-19 PROCEDURE — 36415 COLL VENOUS BLD VENIPUNCTURE: CPT | Performed by: FAMILY MEDICINE

## 2022-01-19 PROCEDURE — 99239 HOSP IP/OBS DSCHRG MGMT >30: CPT | Performed by: FAMILY MEDICINE

## 2022-01-19 PROCEDURE — 250N000013 HC RX MED GY IP 250 OP 250 PS 637: Performed by: FAMILY MEDICINE

## 2022-01-19 PROCEDURE — 250N000013 HC RX MED GY IP 250 OP 250 PS 637: Performed by: HOSPITALIST

## 2022-01-19 RX ORDER — LISINOPRIL 10 MG/1
10 TABLET ORAL DAILY
Qty: 30 TABLET | Refills: 0 | Status: SHIPPED | OUTPATIENT
Start: 2022-01-20 | End: 2023-10-12

## 2022-01-19 RX ORDER — POLYETHYLENE GLYCOL 3350 17 G/17G
17 POWDER, FOR SOLUTION ORAL DAILY
Qty: 510 G | COMMUNITY
Start: 2022-01-19 | End: 2023-10-12

## 2022-01-19 RX ORDER — FOLIC ACID 1 MG/1
1 TABLET ORAL DAILY
Qty: 30 TABLET | Refills: 0 | Status: SHIPPED | OUTPATIENT
Start: 2022-01-20 | End: 2023-10-12

## 2022-01-19 RX ORDER — ALBUTEROL SULFATE 90 UG/1
2 AEROSOL, METERED RESPIRATORY (INHALATION) EVERY 6 HOURS
Qty: 18 G | COMMUNITY
Start: 2022-01-19 | End: 2023-10-12

## 2022-01-19 RX ORDER — FUROSEMIDE 40 MG
40 TABLET ORAL DAILY
Qty: 5 TABLET | Refills: 0 | Status: SHIPPED | OUTPATIENT
Start: 2022-01-20 | End: 2023-10-12

## 2022-01-19 RX ORDER — GUAIFENESIN/DEXTROMETHORPHAN 100-10MG/5
10 SYRUP ORAL EVERY 4 HOURS PRN
Refills: 0 | COMMUNITY
Start: 2022-01-19 | End: 2023-10-12

## 2022-01-19 RX ORDER — CARBOXYMETHYLCELLULOSE SODIUM 5 MG/ML
2 SOLUTION/ DROPS OPHTHALMIC 4 TIMES DAILY PRN
COMMUNITY
Start: 2022-01-19 | End: 2023-10-12

## 2022-01-19 RX ORDER — BENZONATATE 100 MG/1
100 CAPSULE ORAL 3 TIMES DAILY PRN
COMMUNITY
Start: 2022-01-19 | End: 2023-10-12

## 2022-01-19 RX ORDER — ACETAMINOPHEN 325 MG/1
650 TABLET ORAL EVERY 6 HOURS PRN
COMMUNITY
Start: 2022-01-19 | End: 2023-10-12

## 2022-01-19 RX ORDER — LANOLIN ALCOHOL/MO/W.PET/CERES
100 CREAM (GRAM) TOPICAL DAILY
COMMUNITY
Start: 2022-01-20 | End: 2023-10-12

## 2022-01-19 RX ADMIN — LISINOPRIL 10 MG: 10 TABLET ORAL at 10:25

## 2022-01-19 RX ADMIN — Medication 100 MG: at 10:25

## 2022-01-19 RX ADMIN — ALBUTEROL SULFATE 2 PUFF: 90 AEROSOL, METERED RESPIRATORY (INHALATION) at 10:28

## 2022-01-19 RX ADMIN — APIXABAN 10 MG: 5 TABLET, FILM COATED ORAL at 10:25

## 2022-01-19 RX ADMIN — MULTIPLE VITAMINS W/ MINERALS TAB 1 TABLET: TAB at 10:26

## 2022-01-19 RX ADMIN — ALBUTEROL SULFATE 2 PUFF: 90 AEROSOL, METERED RESPIRATORY (INHALATION) at 15:01

## 2022-01-19 RX ADMIN — FUROSEMIDE 40 MG: 40 TABLET ORAL at 10:25

## 2022-01-19 RX ADMIN — FOLIC ACID 1 MG: 1 TABLET ORAL at 10:25

## 2022-01-19 ASSESSMENT — ACTIVITIES OF DAILY LIVING (ADL)
ADLS_ACUITY_SCORE: 12
ADLS_ACUITY_SCORE: 11
ADLS_ACUITY_SCORE: 12

## 2022-01-19 NOTE — PLAN OF CARE
Occupational Therapy Discharge Summary    Reason for therapy discharge:    Discharged to transitional care facility.    Progress towards therapy goal(s). See goals on Care Plan in [facility] electronic health record for goal details.  Goals met    Therapy recommendation(s):    Continued therapy is recommended.  Rationale/Recommendations:  pt not at baseline d/t weakness and fatigue. Further OT is recommended at TCU to continue progress.

## 2022-01-19 NOTE — PLAN OF CARE
Physical Therapy Discharge Summary    Reason for therapy discharge:    Discharged to transitional care facility.    Progress towards therapy goal(s). See goals on Care Plan in [facility] electronic health record for goal details.  Goals not met.  Barriers to achieving goals:   discharge from facility.    Therapy recommendation(s):    Continued therapy is recommended.  Rationale/Recommendations:  strength, conditioning, mobility.

## 2022-01-19 NOTE — DISCHARGE SUMMARY
[facility] MEDICINE  DISCHARGE SUMMARY     Primary Care Physician: Josr Pruitt  Admission Date: 12/31/2021   Discharge Provider: Savana Mcpherson MD Discharge Date: 1/19/2022   Diet: Low-salt and low fat diet     Code Status: Full Code   Activity: DCACTIVITY: Activity as tolerated        Condition at Discharge: Good     REASON FOR PRESENTATION(See Admission Note for Details)   Alcohol intoxication    PRINCIPAL & ACTIVE DISCHARGE DIAGNOSES     Saddle pulmonary embolism with stable RV strain  DVT in bilateral lower extremity  History of DVT 1 year ago after rib fracture  Acute hypoxic respiratory failure, on 2 L of oxygen  Morbid obesity  Undiagnosed obstructive sleep apnea  COVID infection  Essential hypertension  Thrombocytopenia from chronic alcohol use  Alcohol abuse and tenderness  Alcohol intoxication  Essential tremors/parkinsonism  Depression and anxiety  Generalized weakness and deconditioning      PENDING LABS     Unresulted Labs Ordered in the Past 30 Days of this Admission     No orders found from 12/1/2021 to 1/1/2022.          PROCEDURES ( this hospitalization only)      None    RECOMMENDATIONS TO OUTPATIENT PROVIDER FOR F/U VISIT   Follow- up with the sleep physician in next rounding  Follow-up with primary MD in 1 week after discharge from TCU  Follow-up with neurology for tremors  Follow-up with pulmonology in 1 month  Follow-up with cardiology in 1 month  Echocardiogram in 1 to 3-month  Outpatient sleep study  CBC and BMP in 1 week  Consider outpatient surgery treatment for alcohol dependence      DISPOSITION     Skilled Nursing Facility    SUMMARY OF HOSPITAL COURSE:      Mr.Dennis BAUDILIO Lee is a 71 year old male with a past medical history of alcohol abuse came with alcohol intoxication and difficulty ambulation found to have positive COVID.  He is vaccinated against COVID x2. No booster yet.  Found to have saddle pulmonary embolism with hemodynamically  stable RV strain, probable right middle and lower lobe infarct.  Bilateral leg ultrasound revealed DVT in both legs.  Case was evaluated by IR.  Conservative treatment was acceptable as patient remained hemodynamically stable. Received anticoagulation with IV heparin infusion initially then started Eliquis.  History of PE 1 year ago after rib fractures ( did not complete anticoagulation treatment at that time).  Will continue lifelong anticoagulation treatment.    Echocardiogram in 3-month.  Follow-up with cardiology in 1 month. Treated with Decadron and remdesivir for acute COVID infection.  Requiring 2 L of oxygen at present.  Started on Lasix 40 mg daily for 1 week.  Discharging to TCU for more rehabilitation.  Will have outpatient sleep study to rule out obstructive sleep apnea.    Absolute alcohol cessation is advised.   provided resources for CD treatment.  Started on lisinopril for hypertension.  Has essential tremors. Will follow-up with neurology as outpatient.    Discharge Medications with Med changes:     Current Discharge Medication List      START taking these medications    Details   acetaminophen (TYLENOL) 325 MG tablet Take 2 tablets (650 mg) by mouth every 6 hours as needed for mild pain or other (and adjunct with moderate or severe pain or per patient request)      albuterol (PROAIR HFA/PROVENTIL HFA/VENTOLIN HFA) 108 (90 Base) MCG/ACT inhaler Inhale 2 puffs into the lungs every 6 hours  Qty: 18 g    Comments: Pharmacy may dispense brand covered by insurance (Proair, or proventil or ventolin or generic albuterol inhaler)      !! apixaban ANTICOAGULANT (ELIQUIS) 5 MG tablet Take 2 tablets (10 mg) by mouth 2 times daily Then 5 mg bid  Qty: 12 tablet, Refills: 0      !! apixaban ANTICOAGULANT (ELIQUIS) 5 MG tablet Take 1 tablet (5 mg) by mouth 2 times daily      benzonatate (TESSALON) 100 MG capsule Take 1 capsule (100 mg) by mouth 3 times daily as needed for cough       carboxymethylcellulose PF (REFRESH PLUS) 0.5 % ophthalmic solution Place 2 drops into both eyes 4 times daily as needed for dry eyes      folic acid (FOLVITE) 1 MG tablet Take 1 tablet (1 mg) by mouth daily  Qty: 30 tablet, Refills: 0    Associated Diagnoses: Alcoholic intoxication without complication (H)      furosemide (LASIX) 40 MG tablet Take 1 tablet (40 mg) by mouth daily  Qty: 5 tablet, Refills: 0    Associated Diagnoses: PE (pulmonary thromboembolism) (H)      guaiFENesin-dextromethorphan (ROBITUSSIN DM) 100-10 MG/5ML syrup Take 10 mLs by mouth every 4 hours as needed for cough  Refills: 0      lisinopril (ZESTRIL) 10 MG tablet Take 1 tablet (10 mg) by mouth daily  Qty: 30 tablet, Refills: 0    Associated Diagnoses: Essential hypertension, benign      polyethylene glycol (MIRALAX) 17 GM/Dose powder Take 17 g by mouth daily Hold with diarrhea  Qty: 510 g      thiamine (B-1) 100 MG tablet Take 1 tablet (100 mg) by mouth daily       !! - Potential duplicate medications found. Please discuss with provider.      CONTINUE these medications which have NOT CHANGED    Details   escitalopram oxalate (LEXAPRO) 10 MG tablet [ESCITALOPRAM OXALATE (LEXAPRO) 10 MG TABLET] Take 10 mg by mouth every evening.      !! melatonin 5 mg Tab tablet [MELATONIN 5 MG TAB TABLET] Take 1 tablet (5 mg total) by mouth at bedtime.  Qty: 12 tablet, Refills: 0    Associated Diagnoses: Primary insomnia      !! melatonin 5 MG tablet Take 1 tablet (5 mg) by mouth every evening as needed for sleep      traZODone (DESYREL) 50 MG tablet Take  mg by mouth At Bedtime        !! - Potential duplicate medications found. Please discuss with provider.                Rationale for medication changes:      Eliquis for anticoagulation treatment and  Lasix for 5-day for volume load  Lisinopril for hypertension  Folic acid and thiamine for supplementation          Consults     Cardiology  IR  Immunizations given this encounter     Most Recent  Immunizations   Administered Date(s) Administered     COVID-19,PF,Pfizer (12+ Yrs) 09/22/2021     Flu, Unspecified 09/01/2020           Anticoagulation Information      Started on anticoagulation treatment with Eliquis indefinitely      SIGNIFICANT IMAGING FINDINGS     Results for orders placed or performed during the hospital encounter of 12/31/21   Head CT w/o contrast    Impression    IMPRESSION:  1.  No CT evidence for acute intracranial process.  2.  Brain atrophy and presumed chronic microvascular ischemic changes as above.  3.  No change.   Chest XR,  PA & LAT    Impression    IMPRESSION: Stable cardiomediastinal silhouette. No focal consolidation or pleural effusion. Degenerative change osseous structures. Atherosclerotic aorta. Chronic compression deformities lower thoracic spine.   XR Chest Port 1 View    Impression    IMPRESSION: Lungs are clear. Heart and pulmonary vascularity are normal. No signs of acute disease.   XR Chest 2 Views    Impression    IMPRESSION: Negative chest.  The lungs are clear and there are no pleural effusions. Normal heart size.   CT Chest Pulmonary Embolism w Contrast    Impression    IMPRESSION:  1.  Severe pulmonary emboli including large saddle embolus with findings suggestive of right heart strain.    2.  Probable subtle developing pulmonary infarcts right middle lobe and right lower lobe peripherally.    3.  Report called at 1:58 to the patient's nurseDavid. They will immediately relay results to ordering physician.   US Lower Extremity Venous Duplex Bilateral    Impression    IMPRESSION:  1.  Fragments of deep vein thrombosis in the distal left superficial femoral vein and popliteal vein appear to be causing occlusion of vessels in this location.  2.  Occlusive deep venous thrombosis in one of the paired right peroneal veins.  3.  No other evidence for deep venous thrombosis is identified.       Echocardiogram Complete   Result Value Ref Range    LVEF  60-65%    1.Left  ventricular size, wall motion and function are normal. The ejection  fraction is 60-65%.  2.The right ventricle is mildly dilated. Mildly decreased right ventricular  systolic function  3.Mild to moderate valvular aortic stenosis. Mean gradient 11 mmHg with peak  velocity of 2.1 m/s, however valve not well visualized on the study.  4.Compared to the prior study dated 1/10/2021, there have been no changes.  Both studies of very poor technical quality.    SIGNIFICANT LABORATORY FINDINGS     Creatinine 0.80  Hemoglobin 14, WBC 6.8, platelet 141  AST 59,     Discharge Orders        Adult Cardiology Eval Referral      Adult Neurology  Referral      SLEEP EVALUATION & MANAGEMENT REFERRAL - ADULT -      General info for SNF    Length of Stay Estimate: Short Term Care: Estimated # of Days <30  Condition at Discharge: Stable  Level of care:skilled   Rehabilitation Potential: Good  Admission H&P remains valid and up-to-date: yes  Recent Chemotherapy: N/A  Use Nursing Home Standing Orders: Yes     Mantoux instructions    Give two-step Mantoux (PPD) Per Facility Policy Yes     Follow Up and recommended labs and tests    Follow-up with TCU physician in next rounding  Follow-up with primary MD in 1 week after discharge from TCU  Follow-up with neurology for tremors  Echocardiogram in 3-month  Follow-up with cardiology in 1 month  Outpatient sleep study  CBC and BMP in 1 week  Consider outpatient CD treatment for alcohol use and dependence     Reason for your hospital stay    Alcohol intoxication found to have pulmonary embolism     Activity - Up ad crystal     Full Code     Physical Therapy Adult Consult    Evaluate and treat as clinically indicated.    Reason:  weakness     Occupational Therapy Adult Consult    Evaluate and treat as clinically indicated.    Reason:  weakness     Fall precautions     Oxygen Adult/Peds    Pulmonary embolism     Diet    Follow this diet upon discharge: low salt diet       Examination    Physical Exam   Temp:  [97.5  F (36.4  C)-98.6  F (37  C)] 97.8  F (36.6  C)  Pulse:  [67-84] 77  Resp:  [19-20] 20  BP: (119-152)/(63-80) 138/80  SpO2:  [92 %-94 %] 93 %  Wt Readings from Last 1 Encounters:   01/18/22 141.3 kg (311 lb 6.4 oz)     GENERAL:  Alert, appears comfortable, in no acute distress, appears stated age, morbidly obese   HEAD:  Normocephalic, without obvious abnormality, atraumatic   EYES:  PERRL, conjunctiva  clear,  EOM's intact   NOSE: Nares normal,  mucosa normal, no drainage   THROAT: Lips, mucosa,  gums normal, mouth moist   NECK: Supple, symmetrical, trachea midline   BACK:   Symmetric, no curvature, ROM normal   LUNGS:   Decreased bibasilar breath sound, no rales, rhonchi, or wheezing, symmetric chest rise on inhalation, respirations unlabored   CHEST WALL:  No tenderness or deformity   HEART:  Regular rate and rhythm, S1 and S2 normal, no murmur   ABDOMEN:   Soft, non-tender, bowel sounds active , no masses, no organomegaly   EXTREMITIES: Trace BLE  edema    SKIN: No rashes   NEURO: Alert, oriented x3, moves all four extremities freely   PSYCH: Cooperative, behavior is appropriate            Please see EMR for more detailed significant labs, imaging, consultant notes etc.    ISavana MD, personally saw the patient today and spent greater than 30 minutes discharging this patient.    Savana Mcpherson MD  [facility]    CC:Josr Pruitt

## 2022-01-19 NOTE — PLAN OF CARE
93% on 2 liters oxygen.  Crackles noted in LLL.  VSS, tele NSR with first degree block and RBBB.

## 2022-01-19 NOTE — PLAN OF CARE
Problem: Gas Exchange Impaired  Goal: Optimal Gas Exchange  1/19/2022 1531 by Erwin Martinez, RN  Outcome: Adequate for Discharge    Pt has been cleared for discharge to TCU. He is vitally stable on 1-2L of oxygen. Report given to RN at [facility]. Pt will transfer to TCU via stretcher ride at 1800. All discharge information reviewed with pt and faxed to TCU.

## 2022-01-19 NOTE — PROGRESS NOTES
Care Management Follow Up    Length of Stay (days): 18    Expected Discharge Date: 01/20/2022     Concerns to be Addressed: discharge planning, TCU placement        Patient plan of care discussed at interdisciplinary rounds: Yes    Anticipated Discharge Disposition:  SNF/ Transitional Care     Anticipated Discharge Services:  Transitional Care  Anticipated Discharge DME:  None    Patient/family educated on Medicare website which has current facility and service quality ratings:    Education Provided on the Discharge Plan:    Patient/Family in Agreement with the Plan:      Referrals Placed by CM/SW:  TCU  Private pay costs discussed:     Additional Information:  Chart reviewed and plan of care discussed with hospitalist. Pt is medically ready for discharge to TCU.    Calls placed to TCUs with pending referrals:   - [facility] - call placed to Admissions and left message requesting return call   - [facility] - call placed to Admissions and left message requesting return call   - [facility] - call placed to Admissions and left message requesting return call    Additional calls placed to facilities who had previously declined due to bed availability to check updates on availability this week.  Messages left at [facility].    Pt prefers TCU placement in [facility].  Additional referrals sent to [facility].    10:57 AM  Received call from Ginger at [facility]. Provided update to her screening questions. She will continue to review and call writer back.    11:50 AM  Received call from Ginger at [facility]. She can accept pt for admission today. Pt would need to be agreeable with paying $50/day out of pocket for private room. She states writer will need to submit clinical information to "[employer]. She also states when writer receives fax back from "[employer] writer will need to fax this to Ginger at [facility].     Clinical information submitted to [facility] via Epic.    Received call from Becka at [facility] declining pt for admission.    Met with pt and provided updates regarding TCU search. He states he will need to think about it and talk with his daughter before deciding. He states the out of pocket cost is not an issue.    1:45 PM   Met with pt and he states he will accept placement at [facility] TCU. Discussed transportation options and he would like medical transport and is agreeable with potential out of pocket expenses. Pt is currently on Oxygen, which he does not normally use at home, and will need stretcher transport.    Call placed to [employer] and scheduled stretcher transport at 1800. PCS and PAS completed and given to [facility].  Update provided to charge RN and pt's bedside RN.    Spoke with Kyung Barros from [facility]. She confirms documentation is not needed by us to send to TCU. She will contact TCU and provide update to them.    Sandy Moeller RN

## 2022-01-19 NOTE — DISCHARGE INSTRUCTIONS
Josr Pruitt MD    General - Speciality Unknown    [phone]   It is recommended to follow up with your primary care physician 7 days after leaving TCU.  * obtain Referrals via primary care for Pulmonology, neurology, and cardiology if not already established.

## 2022-01-19 NOTE — PROGRESS NOTES
[facility] MEDICINE PROGRESS NOTE      Identification/Summary: Mesfin Lee is a 71 year old male with a past medical history of alcohol abuse came with alcohol intoxication and difficulty ambulation found to have positive COVID.  He is vaccinated against COVID x2.  No booster yet.  Found to have saddle pulmonary embolism with hemodynamically stable RV strain.  Case was evaluated by IR.  Conservative treatment would be acceptable as patient remained hemodynamically stable. Received anticoagulation with IV heparin infusion initially now started Eliquis.  History of PE 1 year ago after rib fractures.  Will continue lifelong anticoagulation treatment.  Treated with Decadron and remdesivir for acute COVID infection.  Requiring 2 L of oxygen at present.  Still  has dyspnea with minimal exertion.  Started on gentle diuresis on 1/18.  Outpatient sleep study to rule out obstructive sleep apnea.  Absolute alcohol cessation is advised.   provided resources for CD treatment.  Started on lisinopril for hypertension.  Has essential tremors.  Follow-up with neurology as outpatient.    Assessment and Plan:  Saddle pulmonary embolism with RV strain  Chest CT-Severe pulmonary emboli including large saddle embolus with findings suggestive of right heart strain. Probable subtle developing pulmonary infarcts right middle lobe and right lower lobe peripherally  Echocardiogram-The right ventricle is mildly dilated. Mildly decreased right ventricular systolic function  Leg ultrasound--Fragments of deep vein thrombosis in the distal left superficial femoral vein and popliteal vein appear to be causing occlusion of vessels in this location. Occlusive deep venous thrombosis in one of the paired right peroneal veins.  Remains hemodynamically stable  Anticoagulation started with IV heparin infusion and then DOAC  Remains hemodynamically stable, conservative treatment would be acceptable  History of  PE 1 year ago after rib fractures  Will be on lifelong anticoagulation treatment  Echocardiogram in 3 months    Acute hypoxic respiratory failure  On 2 L of oxygen at present  Multifactorial etiology including pulmonary embolism, COVID infection, volume load    COVID infection  Vaccinated against COVID x2, no booster yet  Treated with remdesivir and Decadron  Lasix 40 mg daily for few days    Morbid obesity Body mass index is 41.08 kg/m .  Modification of lifestyle  Outpatient sleep study to rule out obstructive sleep apnea    Essential hypertension  Lisinopril 10 mg daily    Thrombocytopenia secondary to chronic alcohol use    Alcohol abuse and dependence  Alcohol intoxication  Absolute alcohol cessation is advised  Folic acid and thiamine supplement    Essential tremor/parkinsonism  Outpatient neurology follow-up    Depression and anxiety  Lexapro 10 mg daily    Gait instability  Generalized weakness and deconditioning  Will be discharging to TCU for more rehabilitation    Code full  DVT prophylaxis  Started on Eliquis  Barrier to discharge  Awaiting for TCU placement.  Anticipated discharge in 1 day    Savana Mcpherson MD  [facility]  [facility]  Phone: #[phone]    Interval History/Subjective:  Resting comfortably.  Dyspnea with minimal exertion.  On 2 L of oxygen at present.  Awaiting for TCU placement.  Afebrile.    Review of system  Rest of the review of system negative except HPI.  Denies headache, chest pain, palpitation, nausea, vomiting, abdominal pain or urinary symptoms.    Physical Exam/Objective:  Temp:  [97.5  F (36.4  C)-98.6  F (37  C)] 97.8  F (36.6  C)  Pulse:  [67-84] 77  Resp:  [19-20] 20  BP: (119-152)/(63-80) 138/80  SpO2:  [91 %-94 %] 93 %  Body mass index is 41.08 kg/m .    GENERAL:  Alert, appears comfortable, in no acute distress, appears stated age, morbidly obese   HEAD:  Normocephalic, without obvious abnormality, atraumatic   EYES:   PERRL, conjunctiva  clear,  EOM's intact   NOSE: Nares normal,  mucosa normal, no drainage   THROAT: Lips, mucosa,  gums normal, mouth moist   NECK: Supple, symmetrical, trachea midline   BACK:   Symmetric, no curvature, ROM normal   LUNGS:   Decreased bibasilar breath sound, no rales, rhonchi, or wheezing, symmetric chest rise on inhalation, respirations unlabored   CHEST WALL:  No tenderness or deformity   HEART:  Regular rate and rhythm, S1 and S2 normal, no murmur   ABDOMEN:   Soft, non-tender, bowel sounds active , no masses, no organomegaly   EXTREMITIES: Trace BLE  edema    SKIN: No rashes   NEURO: Alert, oriented x3, moves all four extremities freely   PSYCH: Cooperative, behavior is appropriate      Data reviewed today: I personally reviewed all new medications, labs, imaging/diagnostics reports over the past 24 hours. Pertinent findings include:    Imaging:   Echocardiogram  1.Left ventricular size, wall motion and function are normal. The ejection fraction is 60-65%.  2.The right ventricle is mildly dilated. Mildly decreased right ventricular systolic function  3.Mild to moderate valvular aortic stenosis. Mean gradient 11 mmHg with peak velocity of 2.1 m/s, however valve not well visualized on the study.  4.Compared to the prior study dated 1/10/2021, there have been no changes.  Both studies of very poor technical quality    Chest CT  1.  Severe pulmonary emboli including large saddle embolus with findings suggestive of right heart strain.  2.  Probable subtle developing pulmonary infarcts right middle lobe and right lower lobe peripherally.    Leg US  1.  Fragments of deep vein thrombosis in the distal left superficial femoral vein and popliteal vein appear to be causing occlusion of vessels in this location.  2.  Occlusive deep venous thrombosis in one of the paired right peroneal veins.    Labs:  Most Recent 3 CBC's:  Recent Labs   Lab Test 01/16/22  1409 01/15/22  1525 01/13/22  0638   WBC 6.8 7.7  9.0   HGB 14.0 14.9 14.3   * 108* 101*   * 214 166     Most Recent 3 BMP's:  Recent Labs   Lab Test 01/19/22  0604 01/18/22  0548 01/17/22  0657 01/16/22  1409 01/14/22  0604 01/13/22  0638   *  --   --  135*  --  137   POTASSIUM 4.3 4.5 4.3 4.1   < > 4.1   CHLORIDE 103  --   --  101  --  104   CO2 27  --   --  25  --  25   BUN 11  --   --  13  --  22   CR 0.80  --   --  0.79  --  0.77   ANIONGAP 5  --   --  9  --  8   CLIF 8.6  --   --  9.0  --  8.9     --   --  127*  --  105    < > = values in this interval not displayed.       Medications:   Personally Reviewed.  Medications     - MEDICATION INSTRUCTIONS -         albuterol  2 puff Inhalation Q6H     apixaban ANTICOAGULANT  10 mg Oral BID    Followed by     [START ON 1/23/2022] apixaban ANTICOAGULANT  5 mg Oral BID     escitalopram  10 mg Oral QPM     folic acid  1 mg Oral Daily     furosemide  40 mg Oral Daily     lisinopril  10 mg Oral Daily     melatonin  5 mg Oral At Bedtime     multivitamin w/minerals  1 tablet Oral Daily     polyethylene glycol  17 g Oral BID     senna-docusate  2 tablet Oral BID     sodium chloride (PF)  3 mL Intracatheter Q8H     thiamine  100 mg Oral Daily

## 2022-01-19 NOTE — PLAN OF CARE
"  Problem: Gas Exchange Impaired  Goal: Optimal Gas Exchange  Outcome: No Change     Problem: Adult Inpatient Plan of Care  Goal: Optimal Comfort and Wellbeing  Outcome: Improving     /70 (BP Location: Right arm)   Pulse 67   Temp 97.5  F (36.4  C) (Oral)   Resp 20   Ht 1.854 m (6' 1\")   Wt 141.3 kg (311 lb 6.4 oz)   SpO2 93%   BMI 41.08 kg/m      PRIMARY DIAGNOSIS: \"GENERIC\" NURSING  OUTPATIENT/OBSERVATION GOALS TO BE MET BEFORE DISCHARGE:  ADLs back to baseline: Yes    Activity and level of assistance: Up with standby assistance.    Pain status: Pain free.    Return to near baseline physical activity: Yes     Discharge Planner Nurse   Safe discharge environment identified: No  Barriers to discharge: No       Entered by: Trae Geiger 01/19/2022 3:52 AM     Please review provider order for any additional goals.   Nurse to notify provider when observation goals have been met and patient is ready for discharge.  "

## 2022-01-20 ENCOUNTER — PATIENT OUTREACH (OUTPATIENT)
Dept: CARE COORDINATION | Facility: CLINIC | Age: 72
End: 2022-01-20
Payer: COMMERCIAL

## 2022-01-20 DIAGNOSIS — Z71.89 OTHER SPECIFIED COUNSELING: ICD-10-CM

## 2022-01-20 NOTE — PROGRESS NOTES
CM received call from Brianne with [employer] notifying of SNF authorization for 1/19-1/28. Number: [id number]

## 2022-01-20 NOTE — PROGRESS NOTES
Clinic Care Coordination Contact    Background: Care Coordination referral placed from hospitals discharge report for reason of patient meeting criteria for a TCM outreach call by [facility] team.    Assessment: Upon chart review, [facility] Team member will cancel/close the referral for TCM outreach due to reason below:    Patient is not established within [facility]. Upon chart review, [facility] Team member noted patient discharged to TCU    Plan: Care Coordination referral for TCM outreach canceled.    Belkis De MA  [facility], [facility]

## 2022-01-24 ENCOUNTER — LAB REQUISITION (OUTPATIENT)
Dept: LAB | Facility: CLINIC | Age: 72
End: 2022-01-24
Payer: COMMERCIAL

## 2022-01-24 DIAGNOSIS — R68.89 OTHER GENERAL SYMPTOMS AND SIGNS: ICD-10-CM

## 2022-01-24 DIAGNOSIS — R94.6 ABNORMAL RESULTS OF THYROID FUNCTION STUDIES: ICD-10-CM

## 2022-01-24 DIAGNOSIS — E61.1 IRON DEFICIENCY: ICD-10-CM

## 2022-01-24 DIAGNOSIS — D52.9 FOLATE DEFICIENCY ANEMIA, UNSPECIFIED: ICD-10-CM

## 2022-01-24 DIAGNOSIS — D51.9 VITAMIN B12 DEFICIENCY ANEMIA, UNSPECIFIED: ICD-10-CM

## 2022-01-24 DIAGNOSIS — D50.0 IRON DEFICIENCY ANEMIA SECONDARY TO BLOOD LOSS (CHRONIC): ICD-10-CM

## 2022-01-24 DIAGNOSIS — R97.0 ELEVATED CARCINOEMBRYONIC ANTIGEN (CEA): ICD-10-CM

## 2022-01-24 DIAGNOSIS — Z13.228 ENCOUNTER FOR SCREENING FOR OTHER METABOLIC DISORDERS: ICD-10-CM

## 2022-01-25 ENCOUNTER — LAB REQUISITION (OUTPATIENT)
Dept: LAB | Facility: CLINIC | Age: 72
End: 2022-01-25
Payer: COMMERCIAL

## 2022-01-25 DIAGNOSIS — I26.09 OTHER PULMONARY EMBOLISM WITH ACUTE COR PULMONALE (H): ICD-10-CM

## 2022-01-25 DIAGNOSIS — D69.6 THROMBOCYTOPENIA, UNSPECIFIED (H): ICD-10-CM

## 2022-01-25 DIAGNOSIS — F10.10 ALCOHOL ABUSE, UNCOMPLICATED: ICD-10-CM

## 2022-01-25 DIAGNOSIS — I82.403 ACUTE EMBOLISM AND THROMBOSIS OF UNSPECIFIED DEEP VEINS OF LOWER EXTREMITY, BILATERAL (H): ICD-10-CM

## 2022-01-25 LAB
ALBUMIN SERPL-MCNC: 3.2 G/DL (ref 3.5–5)
ALP SERPL-CCNC: 78 U/L (ref 45–120)
ALT SERPL W P-5'-P-CCNC: 60 U/L (ref 0–45)
ANION GAP SERPL CALCULATED.3IONS-SCNC: 11 MMOL/L (ref 5–18)
AST SERPL W P-5'-P-CCNC: 41 U/L (ref 0–40)
BILIRUB DIRECT SERPL-MCNC: 0.3 MG/DL
BILIRUB SERPL-MCNC: 0.6 MG/DL (ref 0–1)
BUN SERPL-MCNC: 22 MG/DL (ref 8–28)
CALCIUM SERPL-MCNC: 8.9 MG/DL (ref 8.5–10.5)
CHLORIDE BLD-SCNC: 104 MMOL/L (ref 98–107)
CO2 SERPL-SCNC: 22 MMOL/L (ref 22–31)
CREAT SERPL-MCNC: 1.09 MG/DL (ref 0.7–1.3)
ERYTHROCYTE [DISTWIDTH] IN BLOOD BY AUTOMATED COUNT: 12.6 % (ref 10–15)
ERYTHROCYTE [SEDIMENTATION RATE] IN BLOOD BY WESTERGREN METHOD: 70 MM/HR (ref 0–15)
FERRITIN SERPL-MCNC: 339 NG/ML (ref 27–300)
FOLATE SERPL-MCNC: 10.7 NG/ML
GFR SERPL CREATININE-BSD FRML MDRD: 73 ML/MIN/1.73M2
GLUCOSE BLD-MCNC: 97 MG/DL (ref 70–125)
HBA1C MFR BLD: 5.8 %
HCT VFR BLD AUTO: 39.9 % (ref 40–53)
HGB BLD-MCNC: 13.3 G/DL (ref 13.3–17.7)
IRON SATN MFR SERPL: 27 % (ref 20–50)
IRON SERPL-MCNC: 72 UG/DL (ref 42–175)
IRON SERPL-MCNC: 72 UG/DL (ref 42–175)
MCH RBC QN AUTO: 33.4 PG (ref 26.5–33)
MCHC RBC AUTO-ENTMCNC: 33.3 G/DL (ref 31.5–36.5)
MCV RBC AUTO: 100 FL (ref 78–100)
PLATELET # BLD AUTO: 203 10E3/UL (ref 150–450)
POTASSIUM BLD-SCNC: 4.7 MMOL/L (ref 3.5–5)
PROT SERPL-MCNC: 7.4 G/DL (ref 6–8)
RBC # BLD AUTO: 3.98 10E6/UL (ref 4.4–5.9)
SODIUM SERPL-SCNC: 137 MMOL/L (ref 136–145)
TIBC SERPL-MCNC: 269 UG/DL (ref 313–563)
TOTAL PROTEIN SERUM FOR ELP: 6.9 G/DL (ref 6–8)
TRANSFERRIN SERPL-MCNC: 215 MG/DL (ref 212–360)
TSH SERPL DL<=0.005 MIU/L-ACNC: 2.34 UIU/ML (ref 0.3–5)
VIT B12 SERPL-MCNC: 543 PG/ML (ref 213–816)
WBC # BLD AUTO: 6.5 10E3/UL (ref 4–11)

## 2022-01-25 PROCEDURE — 82728 ASSAY OF FERRITIN: CPT | Mod: ORL | Performed by: INTERNAL MEDICINE

## 2022-01-25 PROCEDURE — 84165 PROTEIN E-PHORESIS SERUM: CPT | Mod: TC,ORL | Performed by: INTERNAL MEDICINE

## 2022-01-25 PROCEDURE — 82746 ASSAY OF FOLIC ACID SERUM: CPT | Mod: ORL | Performed by: INTERNAL MEDICINE

## 2022-01-25 PROCEDURE — 85652 RBC SED RATE AUTOMATED: CPT | Mod: ORL | Performed by: INTERNAL MEDICINE

## 2022-01-25 PROCEDURE — 82248 BILIRUBIN DIRECT: CPT | Mod: ORL | Performed by: INTERNAL MEDICINE

## 2022-01-25 PROCEDURE — 84443 ASSAY THYROID STIM HORMONE: CPT | Mod: ORL | Performed by: INTERNAL MEDICINE

## 2022-01-25 PROCEDURE — 83036 HEMOGLOBIN GLYCOSYLATED A1C: CPT | Mod: ORL | Performed by: INTERNAL MEDICINE

## 2022-01-25 PROCEDURE — 36415 COLL VENOUS BLD VENIPUNCTURE: CPT | Mod: ORL | Performed by: INTERNAL MEDICINE

## 2022-01-25 PROCEDURE — 83540 ASSAY OF IRON: CPT | Mod: ORL | Performed by: INTERNAL MEDICINE

## 2022-01-25 PROCEDURE — 80053 COMPREHEN METABOLIC PANEL: CPT | Mod: ORL | Performed by: INTERNAL MEDICINE

## 2022-01-25 PROCEDURE — 85027 COMPLETE CBC AUTOMATED: CPT | Mod: ORL | Performed by: INTERNAL MEDICINE

## 2022-01-25 PROCEDURE — 82607 VITAMIN B-12: CPT | Mod: ORL | Performed by: INTERNAL MEDICINE

## 2022-01-25 PROCEDURE — 84155 ASSAY OF PROTEIN SERUM: CPT | Mod: ORL | Performed by: INTERNAL MEDICINE

## 2022-01-25 PROCEDURE — P9603 ONE-WAY ALLOW PRORATED MILES: HCPCS | Mod: ORL | Performed by: INTERNAL MEDICINE

## 2022-01-25 NOTE — TELEPHONE ENCOUNTER
Unable to reach patient; LM with direct number for call back to schedule New PH appt. Tana Sharma RN on 1/25/2022 at 8:57 AM    Unable to reach x2; LM with number for call back. Tana Sharma RN on 2/2/2022 at 10:37 AM    Maximum attempts reached; LM with number to schedule new PH appt with labs, 6MWT, and PFTs. Letter sent. Tana Sharma RN on 2/10/2022 at 8:57 AM

## 2022-01-26 LAB
ANION GAP SERPL CALCULATED.3IONS-SCNC: 8 MMOL/L (ref 5–18)
BUN SERPL-MCNC: 27 MG/DL (ref 8–28)
CALCIUM SERPL-MCNC: 9.4 MG/DL (ref 8.5–10.5)
CHLORIDE BLD-SCNC: 104 MMOL/L (ref 98–107)
CO2 SERPL-SCNC: 25 MMOL/L (ref 22–31)
CREAT SERPL-MCNC: 1.28 MG/DL (ref 0.7–1.3)
ERYTHROCYTE [DISTWIDTH] IN BLOOD BY AUTOMATED COUNT: 12.5 % (ref 10–15)
GFR SERPL CREATININE-BSD FRML MDRD: 60 ML/MIN/1.73M2
GLUCOSE BLD-MCNC: 99 MG/DL (ref 70–125)
HCT VFR BLD AUTO: 39.1 % (ref 40–53)
HGB BLD-MCNC: 12.7 G/DL (ref 13.3–17.7)
MCH RBC QN AUTO: 33.3 PG (ref 26.5–33)
MCHC RBC AUTO-ENTMCNC: 32.5 G/DL (ref 31.5–36.5)
MCV RBC AUTO: 103 FL (ref 78–100)
PLATELET # BLD AUTO: 192 10E3/UL (ref 150–450)
POTASSIUM BLD-SCNC: 4.8 MMOL/L (ref 3.5–5)
RBC # BLD AUTO: 3.81 10E6/UL (ref 4.4–5.9)
SODIUM SERPL-SCNC: 137 MMOL/L (ref 136–145)
WBC # BLD AUTO: 6.2 10E3/UL (ref 4–11)

## 2022-01-26 PROCEDURE — 85027 COMPLETE CBC AUTOMATED: CPT | Mod: ORL | Performed by: FAMILY MEDICINE

## 2022-01-26 PROCEDURE — P9603 ONE-WAY ALLOW PRORATED MILES: HCPCS | Mod: ORL | Performed by: FAMILY MEDICINE

## 2022-01-26 PROCEDURE — 36415 COLL VENOUS BLD VENIPUNCTURE: CPT | Mod: ORL | Performed by: FAMILY MEDICINE

## 2022-01-26 PROCEDURE — 80048 BASIC METABOLIC PNL TOTAL CA: CPT | Mod: ORL | Performed by: FAMILY MEDICINE

## 2022-01-27 LAB
ALBUMIN PERCENT: 50 % (ref 51–67)
ALBUMIN SERPL ELPH-MCNC: 3.5 G/DL (ref 3.2–4.7)
ALPHA 1 PERCENT: 3.5 % (ref 2–4)
ALPHA 2 PERCENT: 12 % (ref 5–13)
ALPHA1 GLOB SERPL ELPH-MCNC: 0.2 G/DL (ref 0.1–0.3)
ALPHA2 GLOB SERPL ELPH-MCNC: 0.8 G/DL (ref 0.4–0.9)
B-GLOBULIN SERPL ELPH-MCNC: 1 G/DL (ref 0.7–1.2)
BETA PERCENT: 13.8 % (ref 10–17)
GAMMA GLOB SERPL ELPH-MCNC: 1.4 G/DL (ref 0.6–1.4)
GAMMA GLOBULIN PERCENT: 20.7 % (ref 9–20)
MONOCLONAL PEAK: 0.1 G/DL
PATH ICD:: ABNORMAL
PROT PATTERN SERPL ELPH-IMP: ABNORMAL
REVIEWING PATHOLOGIST: ABNORMAL
TOTAL PROTEIN SERUM FOR ELP (SYNCED VALUE): 6.9 G/DL

## 2022-01-27 PROCEDURE — 84165 PROTEIN E-PHORESIS SERUM: CPT | Mod: 26 | Performed by: PATHOLOGY

## 2023-10-12 ENCOUNTER — TRANSITIONAL CARE UNIT VISIT (OUTPATIENT)
Dept: GERIATRICS | Facility: CLINIC | Age: 73
End: 2023-10-12
Payer: COMMERCIAL

## 2023-10-12 VITALS
HEART RATE: 96 BPM | DIASTOLIC BLOOD PRESSURE: 75 MMHG | HEIGHT: 73 IN | SYSTOLIC BLOOD PRESSURE: 148 MMHG | WEIGHT: 315 LBS | RESPIRATION RATE: 16 BRPM | OXYGEN SATURATION: 94 % | TEMPERATURE: 98 F | BODY MASS INDEX: 41.75 KG/M2

## 2023-10-12 DIAGNOSIS — E66.01 MORBID OBESITY (H): ICD-10-CM

## 2023-10-12 DIAGNOSIS — Z46.6 URINARY CATHETER (FOLEY) CHANGE REQUIRED: ICD-10-CM

## 2023-10-12 DIAGNOSIS — K74.60 CIRRHOSIS OF LIVER WITH ASCITES, UNSPECIFIED HEPATIC CIRRHOSIS TYPE (H): ICD-10-CM

## 2023-10-12 DIAGNOSIS — R18.8 CIRRHOSIS OF LIVER WITH ASCITES, UNSPECIFIED HEPATIC CIRRHOSIS TYPE (H): ICD-10-CM

## 2023-10-12 DIAGNOSIS — I10 PRIMARY HYPERTENSION: ICD-10-CM

## 2023-10-12 DIAGNOSIS — R33.9 URINARY RETENTION: ICD-10-CM

## 2023-10-12 DIAGNOSIS — C18.2 CANCER OF ASCENDING COLON (H): Primary | ICD-10-CM

## 2023-10-12 DIAGNOSIS — Z90.49 S/P LAPAROSCOPIC COLECTOMY: ICD-10-CM

## 2023-10-12 PROBLEM — R25.1 TREMOR: Status: ACTIVE | Noted: 2018-06-21

## 2023-10-12 PROBLEM — I35.0 NONRHEUMATIC AORTIC VALVE STENOSIS: Status: ACTIVE | Noted: 2022-01-24

## 2023-10-12 PROBLEM — K25.7 CHRONIC GASTRIC ULCER WITHOUT HEMORRHAGE OR PERFORATION: Status: ACTIVE | Noted: 2017-09-25

## 2023-10-12 PROBLEM — D50.9 MICROCYTIC ANEMIA: Status: ACTIVE | Noted: 2023-10-03
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
## 2023-10-12 PROBLEM — S22.42XD MULTIPLE FRACTURES OF RIBS, LEFT SIDE, SUBSEQUENT ENCOUNTER FOR FRACTURE WITH ROUTINE HEALING: Status: ACTIVE | Noted: 2021-01-15

## 2023-10-12 PROBLEM — I26.99 PE (PULMONARY THROMBOEMBOLISM) (H): Chronic | Status: RESOLVED | Noted: 2021-01-22 | Resolved: 2023-10-12

## 2023-10-12 PROBLEM — G62.9 PERIPHERAL POLYNEUROPATHY: Status: ACTIVE | Noted: 2022-01-24

## 2023-10-12 PROBLEM — F41.9 ANXIETY: Status: ACTIVE | Noted: 2018-10-27

## 2023-10-12 PROBLEM — T14.8XXD OTHER INJURY OF UNSPECIFIED BODY REGION, SUBSEQUENT ENCOUNTER: Status: ACTIVE | Noted: 2021-01-21

## 2023-10-12 PROBLEM — G25.0 TREMOR, ESSENTIAL: Status: ACTIVE | Noted: 2022-01-24

## 2023-10-12 PROBLEM — F10.920 ALCOHOLIC INTOXICATION WITHOUT COMPLICATION (H): Status: RESOLVED | Noted: 2022-01-01 | Resolved: 2023-10-12

## 2023-10-12 PROBLEM — D69.6 THROMBOCYTOPENIA (H): Status: ACTIVE | Noted: 2017-01-03

## 2023-10-12 PROBLEM — I82.403 LEG DVT (DEEP VENOUS THROMBOEMBOLISM), ACUTE, BILATERAL (H): Status: ACTIVE | Noted: 2022-01-24

## 2023-10-12 PROCEDURE — 99310 SBSQ NF CARE HIGH MDM 45: CPT | Performed by: NURSE PRACTITIONER

## 2023-10-12 RX ORDER — ENOXAPARIN SODIUM 100 MG/ML
40 INJECTION SUBCUTANEOUS EVERY 12 HOURS PRN
COMMUNITY
Start: 2023-10-11 | End: 2023-11-04

## 2023-10-12 RX ORDER — OXYCODONE HYDROCHLORIDE 5 MG/1
2.5 TABLET ORAL EVERY 6 HOURS PRN
COMMUNITY
End: 2023-11-21

## 2023-10-12 RX ORDER — TAMSULOSIN HYDROCHLORIDE 0.4 MG/1
0.4 CAPSULE ORAL DAILY
COMMUNITY
End: 2023-11-21

## 2023-10-12 RX ORDER — ACETAMINOPHEN 325 MG/1
650 TABLET ORAL EVERY 4 HOURS PRN
COMMUNITY

## 2023-10-12 NOTE — LETTER
"    10/12/2023        RE: Mesfin Lee  [address] [id number]                                                                                      [facility]     Code Status:  FULL CODE  Visit Type: Hospital F/U     Facility:   [facility] ([facility]) [[id number]]        History of Present Illness:   Hospital Admission Date: 10/3/2023   Hospital Discharge Date: 10/11/2023      Mesfin Lee is a 73 year old male with a history of chronic hepatitis s/p treatment, cirrhosis, gastric ulcer, anxiety, HTN, obesity.  He was recently hospitalized for operative management of known colon cancer of ascending colon.  He had a right colectomy.  Postoperative course complicated by pain however this improved and he was discharged to the Tcu.     Today, he reports his pain is a \"dull ache\".  He is voicing his wish to discharge to home soon. He would like to go visit his mom this weekend and would like a pass for a few hours.  He does endorse loose stools and states he had an \"accident\" today.     He does have a mata catheter due to failed void trial in the hospital and he is asking to have it removed.      Past Medical History:   Diagnosis Date     Hepatitis C      Past Surgical History:   Procedure Laterality Date     US GUIDED NEEDLE PLACEMENT  1/18/2021     WRIST SURGERY       Family History   Problem Relation Age of Onset     No Known Problems Mother      Dementia Father      No Known Problems Sister      No Known Problems Brother      No Known Problems Daughter      No Known Problems Son      No Known Problems Maternal Aunt      No Known Problems Maternal Uncle      No Known Problems Paternal Aunt      No Known Problems Paternal Uncle      No Known Problems Maternal Grandmother      No Known Problems Maternal Grandfather      No Known Problems Paternal Grandmother      No Known Problems Paternal Grandfather      Social History     Socioeconomic History     Marital status:     "  Spouse name: Not on file     Number of children: Not on file     Years of education: Not on file     Highest education level: Not on file   Occupational History     Not on file   Tobacco Use     Smoking status: Former     Smokeless tobacco: Not on file   Substance and Sexual Activity     Alcohol use: Yes     Comment: Alcoholic Drinks/day: 2/3 days     Drug use: No     Sexual activity: Not on file   Other Topics Concern     Not on file   Social History Narrative     Not on file     Social Determinants of Health     Financial Resource Strain: Not on file   Food Insecurity: Not on file   Transportation Needs: Not on file   Physical Activity: Not on file   Stress: Not on file   Social Connections: Not on file   Interpersonal Safety: Not on file   Housing Stability: Not on file       Current Outpatient Medications   Medication Sig Dispense Refill     acetaminophen (TYLENOL) 325 MG tablet Take 650 mg by mouth every 4 hours as needed for mild pain       enoxaparin ANTICOAGULANT (LOVENOX) 40 MG/0.4ML syringe Inject 40 mg Subcutaneous every 12 hours as needed       escitalopram oxalate (LEXAPRO) 10 MG tablet [ESCITALOPRAM OXALATE (LEXAPRO) 10 MG TABLET] Take 10 mg by mouth every evening.       oxyCODONE (ROXICODONE) 5 MG tablet Take 5 mg by mouth every 4 hours as needed for severe pain       tamsulosin (FLOMAX) 0.4 MG capsule Take 0.4 mg by mouth daily       traZODone (DESYREL) 50 MG tablet Take 50 mg by mouth at bedtime       apixaban ANTICOAGULANT (ELIQUIS) 5 MG tablet Take 2 tablets (10 mg) by mouth 2 times daily Then 5 mg bid (Patient not taking: Reported on 10/12/2023) 12 tablet 0     No Known Allergies  Immunization History   Administered Date(s) Administered     COVID-19 MONOVALENT 12+ (Pfizer) 08/31/2021, 09/22/2021     Flu 65+ Years 12/13/2018     Flu, Unspecified 09/01/2020     Influenza (H1N1) 01/14/2010     Influenza (High Dose) 3 valent vaccine 11/02/2016, 09/26/2017     Influenza (IIV3) PF 09/26/2013,  "12/02/2015     Influenza Vaccine 65+ (FLUAD) 10/04/2023     Pneumo Conj 13-V (2010&after) 12/13/2018     Pneumococcal 20 valent Conjugate (Prevnar 20) 06/05/2023     Td (Adult), Adsorbed 01/31/2013         Post Discharge Medication Reconciliation Status: discharge medications reconciled and changed, per note/orders.    Medications list and allergies in the facility chart have been reviewed.  Please see facility EMR for most up to date list.         Review of Systems   Patient denies fever, chills, headache, lightheadedness, dizziness, rhinorrhea, cough, congestion, shortness of breath, chest pain, palpitations, abdominal pain, n/v, change in appetite, change in sleep pattern, dysuria, frequency, burning or pain with urination.  Other than stated in HPI all other review of systems is negative.       Physical Exam  Vital signs:BP (!) 148/75   Pulse 96   Temp 98  F (36.7  C)   Resp 16   Ht 1.854 m (6' 1\")   Wt 147 kg (324 lb)   SpO2 94%   BMI 42.75 kg/m     GENERAL APPEARANCE: Well developed, obese male,  in no acute distress.  HEENT: normocephalic, atraumatic  sclerae anicteric, conjunctivae clear and moist, EOM intact  LUNGS: Lung sounds CTA, no adventitious sounds, respiratory effort normal.  CARD: RRR, S1, S2, without murmurs, gallops, rubs  ABD: Soft, nondistended and nontender with normal bowel sounds.   MSK: Muscle strength and tone were equal bilaterally. Moves all extremities easily and intentionally.   EXTREMITIES: No cyanosis, clubbing or edema.  NEURO: Alert and oriented x 3.  Face is symmetric.  SKIN: midline abdominal incision and multiple lap incisions of abdomen are well approximated and no s/s of infection.   PSYCH: euthymic        Labs:    Component 10/11/23 10/10/23 10/08/23 10/07/23 10/04/23 09/22/23   eGFR >90  87 Low   >90  >90  81 Low   84 Low     CREATININE 0.90 0.93 0.83 0.74 0.99 0.96     HEMOGLOBIN                13.5 - 17.5 g/dL 8.6 Low    MCV                        80 - 100 fL 69 Low "                Assessment/plan:   Cancer of ascending colon (H)  S/P laparoscopic colectomy  Pain controlled, change oxycodone 5mg every 6 hours prn.  Add apap.  Advised to ambulate often.  Follow up with surgeon as directed.  PT/OT eval and treat.  Advised that if therapy felt he was safe to leave for a few hours this weekend then it would be approved.  On lovenox through 11/4/2023    Cirrhosis of liver with ascites, unspecified hepatic cirrhosis type (H)  Noted, monitor.  Follow with GI    Morbid obesity (H)  Limiting mobility and increases mortality.  Advised patient on slow weight loss for health.     Urinary catheter (Rachel) change required  Urinary retention  Orders to remove cathter and check PVRs.  Counseled patient on process.     Primary hypertension  Diet controlled.  Monitor    Sleep disturbance  Stable decrease trazodone to 50mg at HS    Fortyfive minutes spent in reviewing medical records from [facility], assessing patient and counseling on the above plan of care.     Electronically signed by: Danna Toledo NP      Sincerely,        Danna Toledo NP

## 2023-10-12 NOTE — PROGRESS NOTES
"                                                                              [facility]     Code Status:  FULL CODE  Visit Type: Hospital F/U     Facility:   [facility] ([facility]) [[id number]]        History of Present Illness:   Hospital Admission Date: 10/3/2023   Hospital Discharge Date: 10/11/2023      Mesfin Lee is a 73 year old male with a history of chronic hepatitis s/p treatment, cirrhosis, gastric ulcer, anxiety, HTN, obesity.  He was recently hospitalized for operative management of known colon cancer of ascending colon.  He had a right colectomy.  Postoperative course complicated by pain however this improved and he was discharged to the Tcu.     Today, he reports his pain is a \"dull ache\".  He is voicing his wish to discharge to home soon. He would like to go visit his mom this weekend and would like a pass for a few hours.  He does endorse loose stools and states he had an \"accident\" today.     He does have a mata catheter due to failed void trial in the hospital and he is asking to have it removed.      Past Medical History:   Diagnosis Date    Hepatitis C      Past Surgical History:   Procedure Laterality Date    US GUIDED NEEDLE PLACEMENT  1/18/2021    WRIST SURGERY       Family History   Problem Relation Age of Onset    No Known Problems Mother     Dementia Father     No Known Problems Sister     No Known Problems Brother     No Known Problems Daughter     No Known Problems Son     No Known Problems Maternal Aunt     No Known Problems Maternal Uncle     No Known Problems Paternal Aunt     No Known Problems Paternal Uncle     No Known Problems Maternal Grandmother     No Known Problems Maternal Grandfather     No Known Problems Paternal Grandmother     No Known Problems Paternal Grandfather      Social History     Socioeconomic History    Marital status:      Spouse name: Not on file    Number of children: Not on file    Years of education: Not on file    " Highest education level: Not on file   Occupational History    Not on file   Tobacco Use    Smoking status: Former    Smokeless tobacco: Not on file   Substance and Sexual Activity    Alcohol use: Yes     Comment: Alcoholic Drinks/day: 2/3 days    Drug use: No    Sexual activity: Not on file   Other Topics Concern    Not on file   Social History Narrative    Not on file     Social Determinants of Health     Financial Resource Strain: Not on file   Food Insecurity: Not on file   Transportation Needs: Not on file   Physical Activity: Not on file   Stress: Not on file   Social Connections: Not on file   Interpersonal Safety: Not on file   Housing Stability: Not on file       Current Outpatient Medications   Medication Sig Dispense Refill    acetaminophen (TYLENOL) 325 MG tablet Take 650 mg by mouth every 4 hours as needed for mild pain      enoxaparin ANTICOAGULANT (LOVENOX) 40 MG/0.4ML syringe Inject 40 mg Subcutaneous every 12 hours as needed      escitalopram oxalate (LEXAPRO) 10 MG tablet [ESCITALOPRAM OXALATE (LEXAPRO) 10 MG TABLET] Take 10 mg by mouth every evening.      oxyCODONE (ROXICODONE) 5 MG tablet Take 5 mg by mouth every 4 hours as needed for severe pain      tamsulosin (FLOMAX) 0.4 MG capsule Take 0.4 mg by mouth daily      traZODone (DESYREL) 50 MG tablet Take 50 mg by mouth at bedtime      apixaban ANTICOAGULANT (ELIQUIS) 5 MG tablet Take 2 tablets (10 mg) by mouth 2 times daily Then 5 mg bid (Patient not taking: Reported on 10/12/2023) 12 tablet 0     No Known Allergies  Immunization History   Administered Date(s) Administered    COVID-19 MONOVALENT 12+ (Pfizer) 08/31/2021, 09/22/2021    Flu 65+ Years 12/13/2018    Flu, Unspecified 09/01/2020    Influenza (H1N1) 01/14/2010    Influenza (High Dose) 3 valent vaccine 11/02/2016, 09/26/2017    Influenza (IIV3) PF 09/26/2013, 12/02/2015    Influenza Vaccine 65+ (FLUAD) 10/04/2023    Pneumo Conj 13-V (2010&after) 12/13/2018    Pneumococcal 20 valent  "Conjugate (Prevnar 20) 06/05/2023    Td (Adult), Adsorbed 01/31/2013         Post Discharge Medication Reconciliation Status: discharge medications reconciled and changed, per note/orders.    Medications list and allergies in the facility chart have been reviewed.  Please see facility EMR for most up to date list.         Review of Systems   Patient denies fever, chills, headache, lightheadedness, dizziness, rhinorrhea, cough, congestion, shortness of breath, chest pain, palpitations, abdominal pain, n/v, change in appetite, change in sleep pattern, dysuria, frequency, burning or pain with urination.  Other than stated in HPI all other review of systems is negative.       Physical Exam  Vital signs:BP (!) 148/75   Pulse 96   Temp 98  F (36.7  C)   Resp 16   Ht 1.854 m (6' 1\")   Wt 147 kg (324 lb)   SpO2 94%   BMI 42.75 kg/m     GENERAL APPEARANCE: Well developed, obese male,  in no acute distress.  HEENT: normocephalic, atraumatic  sclerae anicteric, conjunctivae clear and moist, EOM intact  LUNGS: Lung sounds CTA, no adventitious sounds, respiratory effort normal.  CARD: RRR, S1, S2, without murmurs, gallops, rubs  ABD: Soft, nondistended and nontender with normal bowel sounds.   MSK: Muscle strength and tone were equal bilaterally. Moves all extremities easily and intentionally.   EXTREMITIES: No cyanosis, clubbing or edema.  NEURO: Alert and oriented x 3.  Face is symmetric.  SKIN: midline abdominal incision and multiple lap incisions of abdomen are well approximated and no s/s of infection.   PSYCH: euthymic        Labs:    Component 10/11/23 10/10/23 10/08/23 10/07/23 10/04/23 09/22/23   eGFR >90  87 Low   >90  >90  81 Low   84 Low     CREATININE 0.90 0.93 0.83 0.74 0.99 0.96     HEMOGLOBIN                13.5 - 17.5 g/dL 8.6 Low    MCV                        80 - 100 fL 69 Low                Assessment/plan:   Cancer of ascending colon (H)  S/P laparoscopic colectomy  Pain controlled, change oxycodone " 5mg every 6 hours prn.  Add apap.  Advised to ambulate often.  Follow up with surgeon as directed.  PT/OT eval and treat.  Advised that if therapy felt he was safe to leave for a few hours this weekend then it would be approved.  On lovenox through 11/4/2023    Cirrhosis of liver with ascites, unspecified hepatic cirrhosis type (H)  Noted, monitor.  Follow with GI    Morbid obesity (H)  Limiting mobility and increases mortality.  Advised patient on slow weight loss for health.     Urinary catheter (Rachel) change required  Urinary retention  Orders to remove cathter and check PVRs.  Counseled patient on process.     Primary hypertension  Diet controlled.  Monitor    Sleep disturbance  Stable decrease trazodone to 50mg at HS    Fortyfive minutes spent in reviewing medical records from [facility], assessing patient and counseling on the above plan of care.     Electronically signed by: Danna Toledo NP

## 2023-10-16 ENCOUNTER — TRANSITIONAL CARE UNIT VISIT (OUTPATIENT)
Dept: GERIATRICS | Facility: CLINIC | Age: 73
End: 2023-10-16
Payer: COMMERCIAL

## 2023-10-16 VITALS
SYSTOLIC BLOOD PRESSURE: 148 MMHG | HEIGHT: 74 IN | BODY MASS INDEX: 40.43 KG/M2 | TEMPERATURE: 98 F | HEART RATE: 96 BPM | OXYGEN SATURATION: 91 % | WEIGHT: 315 LBS | RESPIRATION RATE: 16 BRPM | DIASTOLIC BLOOD PRESSURE: 75 MMHG

## 2023-10-16 DIAGNOSIS — Z90.49 S/P LAPAROSCOPIC COLECTOMY: ICD-10-CM

## 2023-10-16 DIAGNOSIS — F41.9 ANXIETY: ICD-10-CM

## 2023-10-16 DIAGNOSIS — K74.60 CIRRHOSIS OF LIVER WITH ASCITES, UNSPECIFIED HEPATIC CIRRHOSIS TYPE (H): ICD-10-CM

## 2023-10-16 DIAGNOSIS — R18.8 CIRRHOSIS OF LIVER WITH ASCITES, UNSPECIFIED HEPATIC CIRRHOSIS TYPE (H): ICD-10-CM

## 2023-10-16 DIAGNOSIS — C18.2 CANCER OF ASCENDING COLON (H): Primary | ICD-10-CM

## 2023-10-16 DIAGNOSIS — E66.01 MORBID OBESITY (H): ICD-10-CM

## 2023-10-16 DIAGNOSIS — I10 PRIMARY HYPERTENSION: ICD-10-CM

## 2023-10-16 PROBLEM — K29.80 DUODENITIS: Status: ACTIVE | Noted: 2017-09-27

## 2023-10-16 PROBLEM — K64.9 HEMORRHOIDS: Status: ACTIVE | Noted: 2023-08-23

## 2023-10-16 PROBLEM — K63.5 POLYP OF COLON: Status: ACTIVE | Noted: 2018-09-26

## 2023-10-16 PROBLEM — K57.30 DIVERTICULAR DISEASE OF LARGE INTESTINE: Status: ACTIVE | Noted: 2018-09-26

## 2023-10-16 PROBLEM — Z85.038 HISTORY OF MALIGNANT NEOPLASM OF COLON: Status: ACTIVE | Noted: 2023-08-25

## 2023-10-16 PROBLEM — K31.9 DISORDER OF FUNCTION OF STOMACH: Status: ACTIVE | Noted: 2023-08-23

## 2023-10-16 PROBLEM — K63.9 DISORDER OF INTESTINE: Status: ACTIVE | Noted: 2023-08-23

## 2023-10-16 PROBLEM — B96.89 DISORDER ASSOCIATED WITH HELICOBACTER SPECIES: Status: ACTIVE | Noted: 2017-09-27

## 2023-10-16 PROCEDURE — 99305 1ST NF CARE MODERATE MDM 35: CPT | Performed by: FAMILY MEDICINE

## 2023-10-16 RX ORDER — LANOLIN ALCOHOL/MO/W.PET/CERES
3 CREAM (GRAM) TOPICAL AT BEDTIME
COMMUNITY
End: 2023-11-21

## 2023-10-16 NOTE — LETTER
10/16/2023        RE: Mesfin Lee  [address] [address]        [facility]       Patient Mesfin Lee  MRN: [id number]        Reason for Visit     Chief Complaint   Patient presents with     RECHECK     INITIAL       Code Status     CPR/Full code     Assessment     Colon cancer status post surgical resection on 10/3/2023 with patient undergoing robotic assisted right colectomy  Acute hypoxic resp failure  Urinary retention  Chronic hepatitis C status posttreatment  Cirrhosis of the liver  Hypertension  Obesity  Anxiety disorder  History of gastric ulcer  Generalized weakness  Microcytic anemia  GERD    Plan     Pt is admitted to TCU for strengthening and rehab.  Patient is doing well.  Incision is healing well and continue with incisional cares.  On Lovenox for 24 days for DVT prophylaxis.  Outpatient follow-up with surgery as scheduled.  Pain management reviewed and reporting not much pain so we will decrease oxycodone to 2.5 mg every 6 hours as needed with the aim to wean him off narcotics completely  Reporting good oral intake  Still having some diarrhea but overall improvement reported by patient  Tolerating a regular diet.  Hypoxic respiratory failure seems to be resolving and he is noncompliant with his oxygen   refusing to wear it  Weaning orders written  Rachel catheter has been discontinued and he is currently getting a voiding trial  Discontinue trazodone to as needed  Schedule melatonin 3 mg at bedtime  Discontinue trazodone completely if no use noted in the next week  Monitor mood and cognitive status on Lexapro  Recheck labs  Continue with PT/OT-doing well and reporting that he is ready to go home he lives in a senior apartment by himself   requested to meet with him to discuss discharge planning  He has been recommended to stay on Eliquis lifelong due to history of recurrent PEs and DVT.  Medication was discontinued in the hospital with no restart  date given.  Advise close follow-up with his PCP for discharge to discuss resumption once he is done with Lovenox    History     Patient is a very pleasant 73 year old male who is admitted to TCU  Has a history of colon cancer of the ascending colon robotic assisted right colectomy on 10/3/2023.  Postoperative course complicated by acute on chronic anemia with microcytosis and an MCV of 69.  Currently discharged to the TCU    Past Medical & Surgical History     PAST MEDICAL HISTORY:   Past Medical History:   Diagnosis Date     Hepatitis C       PAST SURGICAL HISTORY:   has a past surgical history that includes Wrist surgery and Us Guided Needle Placement (1/18/2021).      Past Social History     Reviewed,  reports that he has quit smoking. He does not have any smokeless tobacco history on file. He reports current alcohol use. He reports that he does not use drugs.    Family History     Reviewed, and family history includes Dementia in his father; No Known Problems in his brother, daughter, maternal aunt, maternal grandfather, maternal grandmother, maternal uncle, mother, paternal aunt, paternal grandfather, paternal grandmother, paternal uncle, sister, and son.    Medication List     Current Outpatient Medications   Medication     acetaminophen (TYLENOL) 325 MG tablet     enoxaparin ANTICOAGULANT (LOVENOX) 40 MG/0.4ML syringe     escitalopram oxalate (LEXAPRO) 10 MG tablet     oxyCODONE (ROXICODONE) 5 MG tablet     tamsulosin (FLOMAX) 0.4 MG capsule     traZODone (DESYREL) 50 MG tablet     apixaban ANTICOAGULANT (ELIQUIS) 5 MG tablet     No current facility-administered medications for this visit.      MED REC REQUIRED  Post Medication Reconciliation Status: discharge medications reconciled and changed, per note/orders       Allergies     No Known Allergies    Review of Systems   A comprehensive review of 14 systems was done. Pertinent findings noted here and in history of present illness. All the rest  "negative.  Constitutional: Negative.  Negative for fever, chills, he has  activity change, appetite change and fatigue.   HENT: Negative for congestion and facial swelling.    Eyes: Negative for photophobia, redness and visual disturbance.   Respiratory: Negative for cough and chest tightness.   He is unsure why he has been given oxygen and refusing to keep oxygen on  Cardiovascular: Negative for chest pain, palpitations and leg swelling.   Gastrointestinal: Negative for nausea, diarrhea, constipation, blood in stool and abdominal distention.   He was having some loose stools earlier but now reporting an improvement.  Tolerating a regular diet  Genitourinary: Negative.  Rachel has been discontinued and he is currently getting a voiding trial  Musculoskeletal: Negative.    Skin: Negative.    Neurological: Negative for dizziness, tremors, syncope, weakness, light-headedness and headaches.   Hematological: Does not bruise/bleed easily.   Psychiatric/Behavioral: Negative.        Physical Exam   BP (!) 148/75   Pulse 96   Temp 98  F (36.7  C)   Resp 16   Ht 1.88 m (6' 2\")   Wt 147 kg (324 lb)   SpO2 91%   BMI 41.60 kg/m     On 2 L of oxygen  Constitutional: Oriented to person, place, and time and appears well-developed.   HEENT:  Normocephalic and atraumatic.  Eyes: Conjunctivae and EOM are normal. Pupils are equal, round, and reactive to light. No discharge.  No scleral icterus. Nose normal. Mouth/Throat: Oropharynx is clear and moist. No oropharyngeal exudate.    NECK: Normal range of motion. Neck supple. No JVD present. No tracheal deviation present. No thyromegaly present.   CARDIOVASCULAR: Normal rate, regular rhythm and intact distal pulses.  Exam reveals no gallop and no friction rub.  Systolic murmur present.  PULMONARY: Effort normal and breath sounds normal. No respiratory distress.No Wheezing or rales.  ABDOMEN: Soft. Bowel sounds are normal.  There is distension and no mass.  There is no tenderness. " There is no rebound and no guarding. No HSM.  Midline surgical incision is in contact  MUSCULOSKELETAL: Normal range of motion. Mild kyphosis, no tenderness.  LYMPH NODES: Has no cervical, supraclavicular, axillary and groin adenopathy.   NEUROLOGICAL: Alert and oriented to person, place, and time. No cranial nerve deficit.  Normal muscle tone. Coordination normal.   GENITOURINARY: Deferred exam.  SKIN: Skin is warm and dry. No rash noted. No erythema. No pallor.   EXTREMITIES: No cyanosis, no clubbing, no edema. No Deformity.  PSYCHIATRIC: Normal mood, affect and behavior.      Lab Results     Last Comprehensive Metabolic Panel:  Lab Results   Component Value Date     01/26/2022    POTASSIUM 4.8 01/26/2022    CHLORIDE 104 01/26/2022    CO2 25 01/26/2022    ANIONGAP 8 01/26/2022    GLC 99 01/26/2022    BUN 27 01/26/2022    CR 1.28 01/26/2022    GFRESTIMATED 60 (L) 01/26/2022    CLIF 9.4 01/26/2022                  Electronically signed by    Penelope Noel MD                            Sincerely,        OSCAR Arenas

## 2023-10-16 NOTE — PROGRESS NOTES
[facility] GERIATRIC SERVICES       Patient Mesfin Lee  MRN: [id number]        Reason for Visit     Chief Complaint   Patient presents with    RECHECK     INITIAL       Code Status     CPR/Full code     Assessment     Colon cancer status post surgical resection on 10/3/2023 with patient undergoing robotic assisted right colectomy  Acute hypoxic resp failure  Urinary retention  Chronic hepatitis C status posttreatment  Cirrhosis of the liver  Hypertension  Obesity  Anxiety disorder  History of gastric ulcer  Generalized weakness  Microcytic anemia  GERD    Plan     Pt is admitted to TCU for strengthening and rehab.  Patient is doing well.  Incision is healing well and continue with incisional cares.  On Lovenox for 24 days for DVT prophylaxis.  Outpatient follow-up with surgery as scheduled.  Pain management reviewed and reporting not much pain so we will decrease oxycodone to 2.5 mg every 6 hours as needed with the aim to wean him off narcotics completely  Reporting good oral intake  Still having some diarrhea but overall improvement reported by patient  Tolerating a regular diet.  Hypoxic respiratory failure seems to be resolving and he is noncompliant with his oxygen   refusing to wear it  Weaning orders written  Rachel catheter has been discontinued and he is currently getting a voiding trial  Discontinue trazodone to as needed  Schedule melatonin 3 mg at bedtime  Discontinue trazodone completely if no use noted in the next week  Monitor mood and cognitive status on Lexapro  Recheck labs  Continue with PT/OT-doing well and reporting that he is ready to go home he lives in a senior apartment by himself   requested to meet with him to discuss discharge planning  He has been recommended to stay on Eliquis lifelong due to history of recurrent PEs and DVT.  Medication was discontinued in the hospital with no restart date given.  Advise close follow-up with his PCP for discharge to discuss resumption  once he is done with Lovenox    History     Patient is a very pleasant 73 year old male who is admitted to TCU  Has a history of colon cancer of the ascending colon robotic assisted right colectomy on 10/3/2023.  Postoperative course complicated by acute on chronic anemia with microcytosis and an MCV of 69.  Currently discharged to the TCU    Past Medical & Surgical History     PAST MEDICAL HISTORY:   Past Medical History:   Diagnosis Date    Hepatitis C       PAST SURGICAL HISTORY:   has a past surgical history that includes Wrist surgery and Us Guided Needle Placement (1/18/2021).      Past Social History     Reviewed,  reports that he has quit smoking. He does not have any smokeless tobacco history on file. He reports current alcohol use. He reports that he does not use drugs.    Family History     Reviewed, and family history includes Dementia in his father; No Known Problems in his brother, daughter, maternal aunt, maternal grandfather, maternal grandmother, maternal uncle, mother, paternal aunt, paternal grandfather, paternal grandmother, paternal uncle, sister, and son.    Medication List     Current Outpatient Medications   Medication    acetaminophen (TYLENOL) 325 MG tablet    enoxaparin ANTICOAGULANT (LOVENOX) 40 MG/0.4ML syringe    escitalopram oxalate (LEXAPRO) 10 MG tablet    oxyCODONE (ROXICODONE) 5 MG tablet    tamsulosin (FLOMAX) 0.4 MG capsule    traZODone (DESYREL) 50 MG tablet    apixaban ANTICOAGULANT (ELIQUIS) 5 MG tablet     No current facility-administered medications for this visit.      MED REC REQUIRED  Post Medication Reconciliation Status: discharge medications reconciled and changed, per note/orders       Allergies     No Known Allergies    Review of Systems   A comprehensive review of 14 systems was done. Pertinent findings noted here and in history of present illness. All the rest negative.  Constitutional: Negative.  Negative for fever, chills, he has  activity change, appetite change  "and fatigue.   HENT: Negative for congestion and facial swelling.    Eyes: Negative for photophobia, redness and visual disturbance.   Respiratory: Negative for cough and chest tightness.   He is unsure why he has been given oxygen and refusing to keep oxygen on  Cardiovascular: Negative for chest pain, palpitations and leg swelling.   Gastrointestinal: Negative for nausea, diarrhea, constipation, blood in stool and abdominal distention.   He was having some loose stools earlier but now reporting an improvement.  Tolerating a regular diet  Genitourinary: Negative.  Rachel has been discontinued and he is currently getting a voiding trial  Musculoskeletal: Negative.    Skin: Negative.    Neurological: Negative for dizziness, tremors, syncope, weakness, light-headedness and headaches.   Hematological: Does not bruise/bleed easily.   Psychiatric/Behavioral: Negative.        Physical Exam   BP (!) 148/75   Pulse 96   Temp 98  F (36.7  C)   Resp 16   Ht 1.88 m (6' 2\")   Wt 147 kg (324 lb)   SpO2 91%   BMI 41.60 kg/m     On 2 L of oxygen  Constitutional: Oriented to person, place, and time and appears well-developed.   HEENT:  Normocephalic and atraumatic.  Eyes: Conjunctivae and EOM are normal. Pupils are equal, round, and reactive to light. No discharge.  No scleral icterus. Nose normal. Mouth/Throat: Oropharynx is clear and moist. No oropharyngeal exudate.    NECK: Normal range of motion. Neck supple. No JVD present. No tracheal deviation present. No thyromegaly present.   CARDIOVASCULAR: Normal rate, regular rhythm and intact distal pulses.  Exam reveals no gallop and no friction rub.  Systolic murmur present.  PULMONARY: Effort normal and breath sounds normal. No respiratory distress.No Wheezing or rales.  ABDOMEN: Soft. Bowel sounds are normal.  There is distension and no mass.  There is no tenderness. There is no rebound and no guarding. No HSM.  Midline surgical incision is in contact  MUSCULOSKELETAL: Normal " range of motion. Mild kyphosis, no tenderness.  LYMPH NODES: Has no cervical, supraclavicular, axillary and groin adenopathy.   NEUROLOGICAL: Alert and oriented to person, place, and time. No cranial nerve deficit.  Normal muscle tone. Coordination normal.   GENITOURINARY: Deferred exam.  SKIN: Skin is warm and dry. No rash noted. No erythema. No pallor.   EXTREMITIES: No cyanosis, no clubbing, no edema. No Deformity.  PSYCHIATRIC: Normal mood, affect and behavior.      Lab Results     Last Comprehensive Metabolic Panel:  Lab Results   Component Value Date     01/26/2022    POTASSIUM 4.8 01/26/2022    CHLORIDE 104 01/26/2022    CO2 25 01/26/2022    ANIONGAP 8 01/26/2022    GLC 99 01/26/2022    BUN 27 01/26/2022    CR 1.28 01/26/2022    GFRESTIMATED 60 (L) 01/26/2022    CLIF 9.4 01/26/2022                  Electronically signed by    Penelope Noel MD

## 2023-10-17 ENCOUNTER — TRANSITIONAL CARE UNIT VISIT (OUTPATIENT)
Dept: GERIATRICS | Facility: CLINIC | Age: 73
End: 2023-10-17
Payer: COMMERCIAL

## 2023-10-17 VITALS
HEART RATE: 79 BPM | DIASTOLIC BLOOD PRESSURE: 73 MMHG | WEIGHT: 315 LBS | TEMPERATURE: 98 F | HEIGHT: 74 IN | BODY MASS INDEX: 40.43 KG/M2 | SYSTOLIC BLOOD PRESSURE: 147 MMHG | RESPIRATION RATE: 18 BRPM | OXYGEN SATURATION: 90 %

## 2023-10-17 DIAGNOSIS — G62.9 PERIPHERAL POLYNEUROPATHY: ICD-10-CM

## 2023-10-17 DIAGNOSIS — Z86.718 PERSONAL HISTORY OF DVT (DEEP VEIN THROMBOSIS): ICD-10-CM

## 2023-10-17 DIAGNOSIS — K74.60 CIRRHOSIS OF LIVER WITH ASCITES, UNSPECIFIED HEPATIC CIRRHOSIS TYPE (H): ICD-10-CM

## 2023-10-17 DIAGNOSIS — R33.9 URINARY RETENTION: ICD-10-CM

## 2023-10-17 DIAGNOSIS — R18.8 CIRRHOSIS OF LIVER WITH ASCITES, UNSPECIFIED HEPATIC CIRRHOSIS TYPE (H): ICD-10-CM

## 2023-10-17 DIAGNOSIS — C18.2 CANCER OF ASCENDING COLON (H): Primary | ICD-10-CM

## 2023-10-17 PROBLEM — D69.6 THROMBOCYTOPENIA (H): Status: RESOLVED | Noted: 2017-01-03 | Resolved: 2023-10-17

## 2023-10-17 PROBLEM — I82.403 LEG DVT (DEEP VENOUS THROMBOEMBOLISM), ACUTE, BILATERAL (H): Status: RESOLVED | Noted: 2022-01-24 | Resolved: 2023-10-17

## 2023-10-17 PROCEDURE — 99316 NF DSCHRG MGMT 30 MIN+: CPT | Performed by: NURSE PRACTITIONER

## 2023-10-17 NOTE — PROGRESS NOTES
[facility] GERIATRICS      Code Status:  FULL CODE  Visit Type: RECHECK     Facility:   [facility] ([facility]) [[id number]]  PCP:  Josr Pruitt  [phone]       Admission Date to our Facility: 10/11/2023  Discharge Date from our Facility: 10/18/2023    Discharge Diagnosis:    Cancer of ascending colon (H)  Cirrhosis of liver with ascites, unspecified hepatic cirrhosis type (H)  Personal history of DVT (deep vein thrombosis)  Peripheral polyneuropathy  Urinary Retention      History of Present Illness: Mesfin Lee is a 73 year old male with a history of chronic hepatitis s/p treatment, cirrhosis, gastric ulcer, anxiety, HTN, obesity.  He was recently hospitalized for operative management of known colon cancer of ascending colon.  He had a right colectomy.  Postoperative course complicated by pain however this improved and he was discharged to the Tcu.     Skilled Nursing Facility Course:  While in the TCU, he improved his strength and endurance.  He is ambulating independently with a walker.     Cancer of ascending colon (H)  S/P laparoscopic colectomy  Pain controlled, oxycodone taken minimally.  Oxycodone was decreased yesterday.  Advised patient to wean off oxycodone in the next week.  Advised to ambulate often.  Follow up with surgeon as directed. Continue with Lovenox through 11/4.  Nursing to teach lovenox injections.      Cirrhosis of liver with ascites, unspecified hepatic cirrhosis type (H)  Noted, monitor.  Follow with GI     Urinary catheter (Rachel) change required  Urinary retention  Successful Void trial and urinating on his own.      Primary hypertension  Diet controlled.  Monitor     Sleep disturbance  Stable decrease trazodone to 50mg at HS       Discharge Plan:  Stable to discharge to home with home care services.  Advised to follow up with PCP in the next 2 weeks.     Discharge  "Medications:   Current Outpatient Medications   Medication Sig Dispense Refill    acetaminophen (TYLENOL) 325 MG tablet Take 650 mg by mouth every 4 hours as needed for mild pain      enoxaparin ANTICOAGULANT (LOVENOX) 40 MG/0.4ML syringe Inject 40 mg Subcutaneous every 12 hours as needed      escitalopram oxalate (LEXAPRO) 10 MG tablet [ESCITALOPRAM OXALATE (LEXAPRO) 10 MG TABLET] Take 10 mg by mouth every evening.      melatonin 3 MG tablet Take 3 mg by mouth at bedtime      oxyCODONE (ROXICODONE) 5 MG tablet Take 2.5 mg by mouth every 6 hours as needed for severe pain      tamsulosin (FLOMAX) 0.4 MG capsule Take 0.4 mg by mouth daily      traZODone (DESYREL) 50 MG tablet Take 50 mg by mouth nightly as needed         Review of Systems   Patient denies fever, chills, headache, lightheadedness, dizziness, rhinorrhea, cough, congestion, shortness of breath, chest pain, palpitations, abdominal pain, n/v, diarrhea, constipation, change in appetite, change in sleep pattern, dysuria, frequency, burning or pain with urination.  Other than stated in HPI all other review of systems is negative.       Physical Exam  Vital signs:BP (!) 147/73   Pulse 79   Temp 98  F (36.7  C)   Resp 18   Ht 1.88 m (6' 2\")   Wt 147 kg (324 lb)   SpO2 90%   BMI 41.60 kg/m     GENERAL APPEARANCE: Well developed, well nourished, in no acute distress.  HEENT: normocephalic, atraumatic  sclerae anicteric, conjunctivae clear and moist, EOM intact  LUNGS: Lung sounds CTA, no adventitious sounds, respiratory effort normal.  CARD: RRR, S1, S2, without murmurs, gallops, rubs  ABD: Soft, nondistended and nontender with normal bowel sounds.   MSK: Muscle strength and tone were equal bilaterally. Moves all extremities easily and intentionally.   EXTREMITIES: No cyanosis, clubbing or edema.  NEURO: Alert and oriented x 3. Face is symmetric.  SKIN: Inspection of the skin reveals no rashes, ulcerations or petechiae.  PSYCH: euthymic        Labs:  "     Component 10/11/23 10/10/23 10/08/23 10/07/23 10/04/23 09/22/23   eGFR >90  87 Low   >90  >90  81 Low   84 Low     CREATININE 0.90 0.93 0.83 0.74 0.99 0.96      HEMOGLOBIN                13.5 - 17.5 g/dL 8.6 Low    MCV                        80 - 100 fL 69 Low             MEDICAL EQUIPMENT NEEDS:  NA      DISCHARGE PLAN/FACE TO FACE:  I certify that services are/were furnished while this patient was under the care of a physician and that a physician or an allowed non-physician practitioner (NPP), had a face-to-face encounter that meets the physician face-to-face encounter requirements. The encounter was in whole, or in part, related to the primary reason for home health. The patient is confined to his/her home and needs intermittent skilled nursing, physical therapy, speech-language pathology, or the continued need for occupational therapy. A plan of care has been established by a physician and is periodically reviewed by a physician.    I certify that this patient is under my care and that I, or a nurse practitioner or physician's assistant working with me, had a face-to-face encounter that meets the physician face-to-face encounter requirements with this patient.   Date of Face-to-Face Encounter: 10/17/2023    I certify that, based on my findings, the following services are medically necessary home health services: RN, PT/OT    My clinical findings support the need for the above skilled services because:  RN for medication education, PT/OT for ongoing strengthening and endurance    This patient is homebound because:  He requires maximum effort in order to get out into the community on a regular basis.     The patient is, or has been, under my care and I have initiated the establishment of the plan of care. This patient will be followed by a physician who will periodically review the plan of care.    30 total minutes spent with patient and SW in coordinating his discharge plan of care.     Electronically signed  by: Danna Toledo, NP

## 2023-10-17 NOTE — LETTER
10/17/2023        RE: Mesfin Lee  [address]  [facility] [address]                                                                                                            [facility] GERIATRICS      Code Status:  FULL CODE  Visit Type: RECHECK     Facility:   [facility] ([facility]) [[id number]]  PCP:  Josr Pruitt  [phone]       Admission Date to our Facility: 10/11/2023  Discharge Date from our Facility: 10/18/2023    Discharge Diagnosis:    Cancer of ascending colon (H)  Cirrhosis of liver with ascites, unspecified hepatic cirrhosis type (H)  Personal history of DVT (deep vein thrombosis)  Peripheral polyneuropathy  Urinary Retention      History of Present Illness: Mesfin Lee is a 73 year old male with a history of chronic hepatitis s/p treatment, cirrhosis, gastric ulcer, anxiety, HTN, obesity.  He was recently hospitalized for operative management of known colon cancer of ascending colon.  He had a right colectomy.  Postoperative course complicated by pain however this improved and he was discharged to the Tcu.     Skilled Nursing Facility Course:  While in the TCU, he improved his strength and endurance.  He is ambulating independently with a walker.     Cancer of ascending colon (H)  S/P laparoscopic colectomy  Pain controlled, oxycodone taken minimally.  Oxycodone was decreased yesterday.  Advised patient to wean off oxycodone in the next week.  Advised to ambulate often.  Follow up with surgeon as directed. Continue with Lovenox through 11/4.  Nursing to teach lovenox injections.      Cirrhosis of liver with ascites, unspecified hepatic cirrhosis type (H)  Noted, monitor.  Follow with GI     Urinary catheter (Rachel) change required  Urinary retention  Successful Void trial and urinating on his own.      Primary hypertension  Diet controlled.  Monitor     Sleep disturbance  Stable decrease trazodone to 50mg at HS       Discharge Plan:  Stable to discharge to home  "with home care services.  Advised to follow up with PCP in the next 2 weeks.     Discharge Medications:   Current Outpatient Medications   Medication Sig Dispense Refill     acetaminophen (TYLENOL) 325 MG tablet Take 650 mg by mouth every 4 hours as needed for mild pain       enoxaparin ANTICOAGULANT (LOVENOX) 40 MG/0.4ML syringe Inject 40 mg Subcutaneous every 12 hours as needed       escitalopram oxalate (LEXAPRO) 10 MG tablet [ESCITALOPRAM OXALATE (LEXAPRO) 10 MG TABLET] Take 10 mg by mouth every evening.       melatonin 3 MG tablet Take 3 mg by mouth at bedtime       oxyCODONE (ROXICODONE) 5 MG tablet Take 2.5 mg by mouth every 6 hours as needed for severe pain       tamsulosin (FLOMAX) 0.4 MG capsule Take 0.4 mg by mouth daily       traZODone (DESYREL) 50 MG tablet Take 50 mg by mouth nightly as needed         Review of Systems   Patient denies fever, chills, headache, lightheadedness, dizziness, rhinorrhea, cough, congestion, shortness of breath, chest pain, palpitations, abdominal pain, n/v, diarrhea, constipation, change in appetite, change in sleep pattern, dysuria, frequency, burning or pain with urination.  Other than stated in HPI all other review of systems is negative.       Physical Exam  Vital signs:BP (!) 147/73   Pulse 79   Temp 98  F (36.7  C)   Resp 18   Ht 1.88 m (6' 2\")   Wt 147 kg (324 lb)   SpO2 90%   BMI 41.60 kg/m     GENERAL APPEARANCE: Well developed, well nourished, in no acute distress.  HEENT: normocephalic, atraumatic  sclerae anicteric, conjunctivae clear and moist, EOM intact  LUNGS: Lung sounds CTA, no adventitious sounds, respiratory effort normal.  CARD: RRR, S1, S2, without murmurs, gallops, rubs  ABD: Soft, nondistended and nontender with normal bowel sounds.   MSK: Muscle strength and tone were equal bilaterally. Moves all extremities easily and intentionally.   EXTREMITIES: No cyanosis, clubbing or edema.  NEURO: Alert and oriented x 3. Face is symmetric.  SKIN: " Inspection of the skin reveals no rashes, ulcerations or petechiae.  PSYCH: euthymic        Labs:      Component 10/11/23 10/10/23 10/08/23 10/07/23 10/04/23 09/22/23   eGFR >90  87 Low   >90  >90  81 Low   84 Low     CREATININE 0.90 0.93 0.83 0.74 0.99 0.96      HEMOGLOBIN                13.5 - 17.5 g/dL 8.6 Low    MCV                        80 - 100 fL 69 Low             MEDICAL EQUIPMENT NEEDS:  NA      DISCHARGE PLAN/FACE TO FACE:  I certify that services are/were furnished while this patient was under the care of a physician and that a physician or an allowed non-physician practitioner (NPP), had a face-to-face encounter that meets the physician face-to-face encounter requirements. The encounter was in whole, or in part, related to the primary reason for home health. The patient is confined to his/her home and needs intermittent skilled nursing, physical therapy, speech-language pathology, or the continued need for occupational therapy. A plan of care has been established by a physician and is periodically reviewed by a physician.    I certify that this patient is under my care and that I, or a nurse practitioner or physician's assistant working with me, had a face-to-face encounter that meets the physician face-to-face encounter requirements with this patient.   Date of Face-to-Face Encounter: 10/17/2023    I certify that, based on my findings, the following services are medically necessary home health services: RN, PT/OT    My clinical findings support the need for the above skilled services because:  RN for medication education, PT/OT for ongoing strengthening and endurance    This patient is homebound because:  He requires maximum effort in order to get out into the community on a regular basis.     The patient is, or has been, under my care and I have initiated the establishment of the plan of care. This patient will be followed by a physician who will periodically review the plan of care.    30 total  minutes spent with patient and SW in coordinating his discharge plan of care.     Electronically signed by: Danna Toledo NP      Sincerely,        Danna Toledo, NP

## 2023-10-18 ENCOUNTER — LAB REQUISITION (OUTPATIENT)
Dept: LAB | Facility: CLINIC | Age: 73
End: 2023-10-18
Payer: COMMERCIAL

## 2023-10-18 DIAGNOSIS — D64.9 ANEMIA, UNSPECIFIED: ICD-10-CM

## 2023-10-18 DIAGNOSIS — I10 ESSENTIAL (PRIMARY) HYPERTENSION: ICD-10-CM

## 2023-11-21 ENCOUNTER — APPOINTMENT (OUTPATIENT)
Dept: CT IMAGING | Facility: CLINIC | Age: 73
DRG: 175 | End: 2023-11-21
Attending: PHYSICIAN ASSISTANT
Payer: COMMERCIAL

## 2023-11-21 ENCOUNTER — HOSPITAL ENCOUNTER (INPATIENT)
Facility: CLINIC | Age: 73
LOS: 7 days | Discharge: HOME | DRG: 175 | End: 2023-11-28
Attending: EMERGENCY MEDICINE | Admitting: STUDENT IN AN ORGANIZED HEALTH CARE EDUCATION/TRAINING PROGRAM
Payer: COMMERCIAL

## 2023-11-21 ENCOUNTER — APPOINTMENT (OUTPATIENT)
Dept: ULTRASOUND IMAGING | Facility: CLINIC | Age: 73
DRG: 175 | End: 2023-11-21
Attending: EMERGENCY MEDICINE
Payer: COMMERCIAL

## 2023-11-21 DIAGNOSIS — I82.4Y1 ACUTE DEEP VEIN THROMBOSIS (DVT) OF PROXIMAL VEIN OF RIGHT LOWER EXTREMITY (H): ICD-10-CM

## 2023-11-21 DIAGNOSIS — R09.02 HYPOXIA: ICD-10-CM

## 2023-11-21 DIAGNOSIS — I26.99 BILATERAL PULMONARY EMBOLISM (H): Primary | ICD-10-CM

## 2023-11-21 LAB
ANION GAP SERPL CALCULATED.3IONS-SCNC: 9 MMOL/L (ref 7–15)
APTT PPP: 26 SECONDS (ref 22–38)
ATRIAL RATE - MUSE: 83 BPM
BASOPHILS # BLD AUTO: 0 10E3/UL (ref 0–0.2)
BASOPHILS NFR BLD AUTO: 0 %
BUN SERPL-MCNC: 14.3 MG/DL (ref 8–23)
CALCIUM SERPL-MCNC: 9.1 MG/DL (ref 8.8–10.2)
CHLORIDE SERPL-SCNC: 103 MMOL/L (ref 98–107)
CREAT SERPL-MCNC: 0.95 MG/DL (ref 0.67–1.17)
DEPRECATED HCO3 PLAS-SCNC: 27 MMOL/L (ref 22–29)
DIASTOLIC BLOOD PRESSURE - MUSE: 93 MMHG
EGFRCR SERPLBLD CKD-EPI 2021: 85 ML/MIN/1.73M2
EOSINOPHIL # BLD AUTO: 0.2 10E3/UL (ref 0–0.7)
EOSINOPHIL NFR BLD AUTO: 3 %
ERYTHROCYTE [DISTWIDTH] IN BLOOD BY AUTOMATED COUNT: 21.2 % (ref 10–15)
FLUAV RNA SPEC QL NAA+PROBE: NEGATIVE
FLUBV RNA RESP QL NAA+PROBE: NEGATIVE
GLUCOSE SERPL-MCNC: 114 MG/DL (ref 70–99)
HCT VFR BLD AUTO: 31.8 % (ref 40–53)
HGB BLD-MCNC: 9.1 G/DL (ref 13.3–17.7)
IMM GRANULOCYTES # BLD: 0 10E3/UL
IMM GRANULOCYTES NFR BLD: 0 %
INR PPP: 1.17 (ref 0.85–1.15)
INTERPRETATION ECG - MUSE: NORMAL
LYMPHOCYTES # BLD AUTO: 3.9 10E3/UL (ref 0.8–5.3)
LYMPHOCYTES NFR BLD AUTO: 42 %
MCH RBC QN AUTO: 20.1 PG (ref 26.5–33)
MCHC RBC AUTO-ENTMCNC: 28.6 G/DL (ref 31.5–36.5)
MCV RBC AUTO: 70 FL (ref 78–100)
MONOCYTES # BLD AUTO: 0.9 10E3/UL (ref 0–1.3)
MONOCYTES NFR BLD AUTO: 9 %
NEUTROPHILS # BLD AUTO: 4.2 10E3/UL (ref 1.6–8.3)
NEUTROPHILS NFR BLD AUTO: 46 %
NRBC # BLD AUTO: 0 10E3/UL
NRBC BLD AUTO-RTO: 0 /100
NT-PROBNP SERPL-MCNC: 311 PG/ML (ref 0–900)
P AXIS - MUSE: 70 DEGREES
PLATELET # BLD AUTO: 252 10E3/UL (ref 150–450)
POTASSIUM SERPL-SCNC: 4.5 MMOL/L (ref 3.4–5.3)
PR INTERVAL - MUSE: 212 MS
QRS DURATION - MUSE: 148 MS
QT - MUSE: 454 MS
QTC - MUSE: 533 MS
R AXIS - MUSE: 2 DEGREES
RADIOLOGIST FLAGS: ABNORMAL
RBC # BLD AUTO: 4.53 10E6/UL (ref 4.4–5.9)
RSV RNA SPEC NAA+PROBE: NEGATIVE
SARS-COV-2 RNA RESP QL NAA+PROBE: NEGATIVE
SODIUM SERPL-SCNC: 139 MMOL/L (ref 135–145)
SYSTOLIC BLOOD PRESSURE - MUSE: 144 MMHG
T AXIS - MUSE: 18 DEGREES
TROPONIN T SERPL HS-MCNC: 19 NG/L
VENTRICULAR RATE- MUSE: 83 BPM
WBC # BLD AUTO: 9.3 10E3/UL (ref 4–11)

## 2023-11-21 PROCEDURE — 85730 THROMBOPLASTIN TIME PARTIAL: CPT | Performed by: EMERGENCY MEDICINE

## 2023-11-21 PROCEDURE — 250N000013 HC RX MED GY IP 250 OP 250 PS 637: Performed by: STUDENT IN AN ORGANIZED HEALTH CARE EDUCATION/TRAINING PROGRAM

## 2023-11-21 PROCEDURE — 250N000011 HC RX IP 250 OP 636: Mod: JZ | Performed by: EMERGENCY MEDICINE

## 2023-11-21 PROCEDURE — 36415 COLL VENOUS BLD VENIPUNCTURE: CPT | Performed by: EMERGENCY MEDICINE

## 2023-11-21 PROCEDURE — 85610 PROTHROMBIN TIME: CPT | Performed by: EMERGENCY MEDICINE

## 2023-11-21 PROCEDURE — 99285 EMERGENCY DEPT VISIT HI MDM: CPT | Mod: 25

## 2023-11-21 PROCEDURE — 83880 ASSAY OF NATRIURETIC PEPTIDE: CPT | Performed by: EMERGENCY MEDICINE

## 2023-11-21 PROCEDURE — 93005 ELECTROCARDIOGRAM TRACING: CPT | Performed by: EMERGENCY MEDICINE

## 2023-11-21 PROCEDURE — 200N000001 HC R&B ICU

## 2023-11-21 PROCEDURE — 80048 BASIC METABOLIC PNL TOTAL CA: CPT | Performed by: EMERGENCY MEDICINE

## 2023-11-21 PROCEDURE — 93971 EXTREMITY STUDY: CPT | Mod: RT

## 2023-11-21 PROCEDURE — 84484 ASSAY OF TROPONIN QUANT: CPT | Performed by: EMERGENCY MEDICINE

## 2023-11-21 PROCEDURE — 99223 1ST HOSP IP/OBS HIGH 75: CPT | Performed by: STUDENT IN AN ORGANIZED HEALTH CARE EDUCATION/TRAINING PROGRAM

## 2023-11-21 PROCEDURE — 87637 SARSCOV2&INF A&B&RSV AMP PRB: CPT | Performed by: PHYSICIAN ASSISTANT

## 2023-11-21 PROCEDURE — 96374 THER/PROPH/DIAG INJ IV PUSH: CPT | Mod: 59

## 2023-11-21 PROCEDURE — 71275 CT ANGIOGRAPHY CHEST: CPT

## 2023-11-21 PROCEDURE — 85025 COMPLETE CBC W/AUTO DIFF WBC: CPT | Performed by: EMERGENCY MEDICINE

## 2023-11-21 RX ORDER — AMOXICILLIN 250 MG
1 CAPSULE ORAL 2 TIMES DAILY PRN
Status: DISCONTINUED | OUTPATIENT
Start: 2023-11-21 | End: 2023-11-28 | Stop reason: HOSPADM

## 2023-11-21 RX ORDER — PHENOL 1.4 %
10 AEROSOL, SPRAY (ML) MUCOUS MEMBRANE AT BEDTIME
COMMUNITY

## 2023-11-21 RX ORDER — HYDROMORPHONE HYDROCHLORIDE 1 MG/ML
0.5 INJECTION, SOLUTION INTRAMUSCULAR; INTRAVENOUS; SUBCUTANEOUS EVERY 30 MIN PRN
Status: COMPLETED | OUTPATIENT
Start: 2023-11-21 | End: 2023-11-22

## 2023-11-21 RX ORDER — LANOLIN ALCOHOL/MO/W.PET/CERES
3 CREAM (GRAM) TOPICAL
Status: DISCONTINUED | OUTPATIENT
Start: 2023-11-21 | End: 2023-11-28 | Stop reason: HOSPADM

## 2023-11-21 RX ORDER — CALCIUM CARBONATE 500 MG/1
1000 TABLET, CHEWABLE ORAL 4 TIMES DAILY PRN
Status: DISCONTINUED | OUTPATIENT
Start: 2023-11-21 | End: 2023-11-28 | Stop reason: HOSPADM

## 2023-11-21 RX ORDER — AMOXICILLIN 250 MG
2 CAPSULE ORAL 2 TIMES DAILY PRN
Status: DISCONTINUED | OUTPATIENT
Start: 2023-11-21 | End: 2023-11-28 | Stop reason: HOSPADM

## 2023-11-21 RX ORDER — LIDOCAINE 40 MG/G
CREAM TOPICAL
Status: DISCONTINUED | OUTPATIENT
Start: 2023-11-21 | End: 2023-11-28 | Stop reason: HOSPADM

## 2023-11-21 RX ORDER — PROCHLORPERAZINE MALEATE 10 MG
10 TABLET ORAL EVERY 6 HOURS PRN
Status: DISCONTINUED | OUTPATIENT
Start: 2023-11-21 | End: 2023-11-28 | Stop reason: HOSPADM

## 2023-11-21 RX ORDER — HEPARIN SODIUM 10000 [USP'U]/100ML
0-5000 INJECTION, SOLUTION INTRAVENOUS CONTINUOUS
Status: DISPENSED | OUTPATIENT
Start: 2023-11-21 | End: 2023-11-26

## 2023-11-21 RX ORDER — IOPAMIDOL 755 MG/ML
75 INJECTION, SOLUTION INTRAVASCULAR ONCE
Status: COMPLETED | OUTPATIENT
Start: 2023-11-21 | End: 2023-11-21

## 2023-11-21 RX ORDER — PROCHLORPERAZINE MALEATE 10 MG
10 TABLET ORAL EVERY 6 HOURS PRN
COMMUNITY
Start: 2023-11-14

## 2023-11-21 RX ORDER — TRAZODONE HYDROCHLORIDE 100 MG/1
100 TABLET ORAL AT BEDTIME
Status: DISCONTINUED | OUTPATIENT
Start: 2023-11-21 | End: 2023-11-28 | Stop reason: HOSPADM

## 2023-11-21 RX ORDER — ONDANSETRON 2 MG/ML
4 INJECTION INTRAMUSCULAR; INTRAVENOUS EVERY 6 HOURS PRN
Status: DISCONTINUED | OUTPATIENT
Start: 2023-11-21 | End: 2023-11-28 | Stop reason: HOSPADM

## 2023-11-21 RX ORDER — OXYCODONE HYDROCHLORIDE 5 MG/1
5 TABLET ORAL EVERY 4 HOURS PRN
Status: DISCONTINUED | OUTPATIENT
Start: 2023-11-21 | End: 2023-11-28 | Stop reason: HOSPADM

## 2023-11-21 RX ORDER — ESCITALOPRAM OXALATE 10 MG/1
10 TABLET ORAL EVERY EVENING
Status: DISCONTINUED | OUTPATIENT
Start: 2023-11-21 | End: 2023-11-28 | Stop reason: HOSPADM

## 2023-11-21 RX ORDER — ONDANSETRON 4 MG/1
4 TABLET, ORALLY DISINTEGRATING ORAL EVERY 6 HOURS PRN
Status: DISCONTINUED | OUTPATIENT
Start: 2023-11-21 | End: 2023-11-28 | Stop reason: HOSPADM

## 2023-11-21 RX ORDER — ACETAMINOPHEN 325 MG/1
650 TABLET ORAL EVERY 4 HOURS PRN
Status: DISCONTINUED | OUTPATIENT
Start: 2023-11-21 | End: 2023-11-28 | Stop reason: HOSPADM

## 2023-11-21 RX ADMIN — TRAZODONE HYDROCHLORIDE 100 MG: 100 TABLET ORAL at 22:33

## 2023-11-21 RX ADMIN — OXYCODONE HYDROCHLORIDE 2.5 MG: 5 TABLET ORAL at 23:21

## 2023-11-21 RX ADMIN — ESCITALOPRAM OXALATE 10 MG: 10 TABLET ORAL at 23:19

## 2023-11-21 RX ADMIN — HEPARIN SODIUM 1800 UNITS/HR: 10000 INJECTION, SOLUTION INTRAVENOUS at 20:20

## 2023-11-21 RX ADMIN — HYDROMORPHONE HYDROCHLORIDE 0.5 MG: 1 INJECTION, SOLUTION INTRAMUSCULAR; INTRAVENOUS; SUBCUTANEOUS at 21:16

## 2023-11-21 RX ADMIN — IOPAMIDOL 75 ML: 755 INJECTION, SOLUTION INTRAVENOUS at 18:52

## 2023-11-21 ASSESSMENT — ACTIVITIES OF DAILY LIVING (ADL)
ADLS_ACUITY_SCORE: 35

## 2023-11-21 NOTE — ED NOTES
Pt back from US and sats 87% RA, pt denies any shortness of breath, does not appear in any distress. Pt placed on 3L nasal canula to get to 93%, PA updated.

## 2023-11-21 NOTE — ED TRIAGE NOTES
Patient reports right calf pain a couple days ago. Pain now to right thigh as well. Swelling to right leg. Hx. Of blood clots while hospitalized but not currently on blood thinners.

## 2023-11-21 NOTE — ED PROVIDER NOTES
EMERGENCY DEPARTMENT ENCOUNTER      NAME: Mesfin Lee  AGE: 73 year old male  YOB: 1950  MRN: [id number]  EVALUATION DATE & TIME: 11/21/2023  4:03 PM    PCP: Ryan Gallagher    ED PROVIDER: La Solis PA-C      Chief Complaint   Patient presents with    Leg Pain    Leg Swelling       FINAL IMPRESSION:  1. Acute deep vein thrombosis (DVT) of proximal vein of right lower extremity (H)    2. Hypoxia        MEDICAL DECISION MAKING:    Pertinent Labs & Imaging studies reviewed. (See chart for details)  73 year old male with a h/o colon cancer, status post resection 7 weeks ago with planned oral chemo starting tomorrow, small bowel obstruction, hypertension, previous DVT provoked from hospitalization not currently on anticoagulation presents to the Emergency Department for evaluation of progressive right leg pain over the last several days which feels similar to his previous DVTs.  On exam he is alert, nontoxic-appearing and in no acute distress.  Vital signs notable for elevated blood pressure of 171/81, remainder WNL.  At the right leg there is edema of the lower leg with tenderness to palpation of the calf.  Lower leg slightly more warm when compared to unaffected left side.  He does have tenderness of the inner right thigh as well.     Differential diagnosis includes DVT, claudication, PAD, muscle strain.  Patient also notes that he has been short of breath which is not new to him, he has not had a cough or any acute illness, however RN staff notified me that he became hypoxic into the high 80s on room air while awake, he was placed on 3 L O2.  Ultrasound of the right lower extremity does reveal nonocclusive to occlusive DVT right femoral-popliteal-calf veins, due to hypoxia and evidence of DVT will proceed with CT PE.  Additional COVID/flu/RSV testing is pending.      At the end of my shift, patient is signed out to Dr. Yessenia Aranda pending CT PE and determination of  anticoagulation. Please see her notes for further findings and course.     Medical Decision Making    History:  Supplemental history from: Documented in chart, if applicable and Family Member/Significant Other daughter aaron  External Record(s) reviewed: Documented in chart, if applicable. and Inpatient Record: admission 10/3 -10/11/23    Work Up:  Chart documentation includes differential considered and any EKGs or imaging independently interpreted by provider, where specified.  In additional to work up documented, I considered the following work up: Documented in chart, if applicable.    External consultation:  Discussion of management with another provider: Documented in chart, if applicable    Complicating factors:  Care impacted by chronic illness: Cancer/Chemotherapy, Hypertension, and Other: cirrhosis  Care affected by social determinants of health: N/A    Disposition considerations: Admission considered. Patient was signed out to the oncoming physician, disposition pending.        CRITICAL CARE: None    ED COURSE  3:30 PM  Met and evaluated patient in triage. Discussed ED plan.   5:00 Pm RN notified me patient has new hypoxia, 87% on RA with good waveform while awake, placed on 3L to maintain sats 93%  5:30 PM Call from radiology reports acute nonocclusive to occlusive DVT femoral-popliteal- and calf veins  6:00 PM Staffed the patient with Dr. Yessenia Aranda  6:15 PM At the end of my shift, patient signed out to Dr. Aranda. Please see her notes for further findings.    MEDICATIONS GIVEN IN THE EMERGENCY:  Medications - No data to display    NEW PRESCRIPTIONS STARTED AT TODAY'S ER VISIT  New Prescriptions    No medications on file        =================================================================    HPI    Patient information was obtained from: Patient, daughter Aaron    Use of Intrepreter: N/A     Mesfin Lee is a 73 year old male who presents for evaluation of right-sided lower leg pain  "progressive over the last 3 to 4 days.  States that he initially thought maybe he had just pulled a muscle as he had a dull ache but this progressed to throbbing which increases with any movement.  He often has to sit and \"jiggle\" the leg to help improve his discomfort.  He feels that the leg is more swollen than his unaffected side.  Today, pain now has traveled from the calf up to the inner thigh.  He does have a history of a blood clot which was apparently provoked by hospitalization in the past and states that this feels similar.  He is not currently on any anticoagulation.  He did have right colectomy for colon cancer 7 weeks ago. Planned to start oral chemo tomorrow.  He has not had any other symptoms of illness such as fever, sore throat, cough.  He does state that he has unchanged shortness of breath which has been ongoing.  He does not use any oxygen at home.  No known ill contacts.      REVIEW OF SYSTEMS   As noted in HPI. All other systems negative.    PAST MEDICAL HISTORY:  Past Medical History:   Diagnosis Date    Hepatitis C        PAST SURGICAL HISTORY:  Past Surgical History:   Procedure Laterality Date    US GUIDED NEEDLE PLACEMENT  1/18/2021    WRIST SURGERY             CURRENT MEDICATIONS:    acetaminophen (TYLENOL) 325 MG tablet  escitalopram oxalate (LEXAPRO) 10 MG tablet  melatonin 3 MG tablet  oxyCODONE (ROXICODONE) 5 MG tablet  tamsulosin (FLOMAX) 0.4 MG capsule  traZODone (DESYREL) 50 MG tablet        ALLERGIES:  No Known Allergies    FAMILY HISTORY:  Family History   Problem Relation Age of Onset    No Known Problems Mother     Dementia Father     No Known Problems Sister     No Known Problems Brother     No Known Problems Daughter     No Known Problems Son     No Known Problems Maternal Aunt     No Known Problems Maternal Uncle     No Known Problems Paternal Aunt     No Known Problems Paternal Uncle     No Known Problems Maternal Grandmother     No Known Problems Maternal Grandfather     " "No Known Problems Paternal Grandmother     No Known Problems Paternal Grandfather        SOCIAL HISTORY:   Social History     Socioeconomic History    Marital status:      Spouse name: Not on file    Number of children: Not on file    Years of education: Not on file    Highest education level: Not on file   Occupational History    Not on file   Tobacco Use    Smoking status: Former    Smokeless tobacco: Not on file   Substance and Sexual Activity    Alcohol use: Yes     Comment: Alcoholic Drinks/day: 2/3 days    Drug use: No    Sexual activity: Not on file   Other Topics Concern    Not on file   Social History Narrative    Not on file     Social Determinants of Health     Financial Resource Strain: Not on file   Food Insecurity: Not on file   Transportation Needs: Not on file   Physical Activity: Not on file   Stress: Not on file   Social Connections: Not on file   Interpersonal Safety: Not on file   Housing Stability: Not on file         VITALS:  Patient Vitals for the past 24 hrs:   BP Temp Temp src Pulse Resp SpO2 Height Weight   11/21/23 1800 (!) 157/74 -- -- 80 -- 99 % -- --   11/21/23 1730 -- -- -- 80 -- 99 % -- --   11/21/23 1719 -- -- -- -- -- 93 % -- --   11/21/23 1716 -- -- -- -- -- (!) 87 % -- --   11/21/23 1715 (!) 171/81 -- -- 87 -- 93 % -- --   11/21/23 1645 126/75 -- -- 81 -- 91 % -- --   11/21/23 1615 (!) 162/80 -- -- 88 -- 93 % -- --   11/21/23 1316 (!) 140/61 97.1  F (36.2  C) Temporal 91 20 94 % 1.854 m (6' 1\") 131.5 kg (290 lb)       PHYSICAL EXAM    Physical Exam  Vitals reviewed.   Constitutional:       General: He is not in acute distress.     Appearance: Normal appearance. He is obese. He is ill-appearing (chronically). He is not toxic-appearing.   Cardiovascular:      Rate and Rhythm: Normal rate.      Pulses: Normal pulses.   Pulmonary:      Effort: Pulmonary effort is normal. No respiratory distress.      Breath sounds: No wheezing or rales.   Musculoskeletal:         General: " Swelling and tenderness present.      Comments: Right leg with calf non pitting edema, warmth as compared to non affected left. No palpable cord. There is tenderness with palpation.    Skin:     General: Skin is warm.      Capillary Refill: Capillary refill takes less than 2 seconds.      Findings: No erythema or lesion.   Neurological:      Mental Status: He is alert.            LAB:  All pertinent labs reviewed and interpreted.    Labs Ordered and Resulted from Time of ED Arrival to Time of ED Departure   BASIC METABOLIC PANEL - Abnormal       Result Value    Sodium 139      Potassium 4.5      Chloride 103      Carbon Dioxide (CO2) 27      Anion Gap 9      Urea Nitrogen 14.3      Creatinine 0.95      GFR Estimate 85      Calcium 9.1      Glucose 114 (*)    INR - Abnormal    INR 1.17 (*)    CBC WITH PLATELETS AND DIFFERENTIAL - Abnormal    WBC Count 9.3      RBC Count 4.53      Hemoglobin 9.1 (*)     Hematocrit 31.8 (*)     MCV 70 (*)     MCH 20.1 (*)     MCHC 28.6 (*)     RDW 21.2 (*)     Platelet Count 252      % Neutrophils 46      % Lymphocytes 42      % Monocytes 9      % Eosinophils 3      % Basophils 0      % Immature Granulocytes 0      NRBCs per 100 WBC 0      Absolute Neutrophils 4.2      Absolute Lymphocytes 3.9      Absolute Monocytes 0.9      Absolute Eosinophils 0.2      Absolute Basophils 0.0      Absolute Immature Granulocytes 0.0      Absolute NRBCs 0.0     PARTIAL THROMBOPLASTIN TIME - Normal    aPTT 26     INFLUENZA A/B, RSV, & SARS-COV2 PCR - Normal    Influenza A PCR Negative      Influenza B PCR Negative      RSV PCR Negative      SARS CoV2 PCR Negative           RADIOLOGY:  Reviewed all pertinent imaging. Please see official radiology report    US Lower Extremity Venous Duplex Right   Final Result   IMPRESSION:   Nonocclusive to occlusive DVT extending from the mid right femoral vein into the popliteal and calf veins.      Findings discussed with La Solis PA-C, of the ED at 1733  hours.       CT Chest Pulmonary Embolism w Contrast    (Results Pending)           La Solis PA-C  Emergency Medicine  [facility] EMERGENCY ROOM  [address] [address] [phone]  Dept: [phone]    This note has in part been created with speech recognition technology and may create an occasional, unintended word/grammar substitution. Errors are generally corrected in real time. Please message me via [employer] In Basket if you note any errors requiring clarification.       La Solis PA-C  11/21/23 4565

## 2023-11-22 ENCOUNTER — APPOINTMENT (OUTPATIENT)
Dept: CARDIOLOGY | Facility: CLINIC | Age: 73
DRG: 175 | End: 2023-11-22
Attending: STUDENT IN AN ORGANIZED HEALTH CARE EDUCATION/TRAINING PROGRAM
Payer: COMMERCIAL

## 2023-11-22 LAB
ANION GAP SERPL CALCULATED.3IONS-SCNC: 7 MMOL/L (ref 7–15)
BUN SERPL-MCNC: 13.7 MG/DL (ref 8–23)
CALCIUM SERPL-MCNC: 8.3 MG/DL (ref 8.8–10.2)
CHLORIDE SERPL-SCNC: 102 MMOL/L (ref 98–107)
CREAT SERPL-MCNC: 0.93 MG/DL (ref 0.67–1.17)
DEPRECATED HCO3 PLAS-SCNC: 27 MMOL/L (ref 22–29)
EGFRCR SERPLBLD CKD-EPI 2021: 87 ML/MIN/1.73M2
ERYTHROCYTE [DISTWIDTH] IN BLOOD BY AUTOMATED COUNT: 21 % (ref 10–15)
GLUCOSE SERPL-MCNC: 104 MG/DL (ref 70–99)
HCT VFR BLD AUTO: 30.5 % (ref 40–53)
HGB BLD-MCNC: 7.9 G/DL (ref 13.3–17.7)
HGB BLD-MCNC: 8.1 G/DL (ref 13.3–17.7)
HGB BLD-MCNC: 8.5 G/DL (ref 13.3–17.7)
LVEF ECHO: NORMAL
MCH RBC QN AUTO: 19.8 PG (ref 26.5–33)
MCHC RBC AUTO-ENTMCNC: 27.9 G/DL (ref 31.5–36.5)
MCV RBC AUTO: 71 FL (ref 78–100)
PLATELET # BLD AUTO: 221 10E3/UL (ref 150–450)
POTASSIUM SERPL-SCNC: 4.5 MMOL/L (ref 3.4–5.3)
RBC # BLD AUTO: 4.3 10E6/UL (ref 4.4–5.9)
SODIUM SERPL-SCNC: 136 MMOL/L (ref 135–145)
UFH PPP CHRO-ACNC: 0.19 IU/ML
UFH PPP CHRO-ACNC: 0.23 IU/ML
UFH PPP CHRO-ACNC: 0.24 IU/ML
UFH PPP CHRO-ACNC: 0.3 IU/ML
WBC # BLD AUTO: 6.5 10E3/UL (ref 4–11)

## 2023-11-22 PROCEDURE — 99233 SBSQ HOSP IP/OBS HIGH 50: CPT | Performed by: STUDENT IN AN ORGANIZED HEALTH CARE EDUCATION/TRAINING PROGRAM

## 2023-11-22 PROCEDURE — 85520 HEPARIN ASSAY: CPT | Performed by: EMERGENCY MEDICINE

## 2023-11-22 PROCEDURE — 255N000002 HC RX 255 OP 636: Performed by: STUDENT IN AN ORGANIZED HEALTH CARE EDUCATION/TRAINING PROGRAM

## 2023-11-22 PROCEDURE — 85520 HEPARIN ASSAY: CPT | Performed by: STUDENT IN AN ORGANIZED HEALTH CARE EDUCATION/TRAINING PROGRAM

## 2023-11-22 PROCEDURE — 94640 AIRWAY INHALATION TREATMENT: CPT | Mod: 76

## 2023-11-22 PROCEDURE — 250N000011 HC RX IP 250 OP 636: Performed by: EMERGENCY MEDICINE

## 2023-11-22 PROCEDURE — 999N000208 ECHOCARDIOGRAM COMPLETE

## 2023-11-22 PROCEDURE — 85018 HEMOGLOBIN: CPT | Performed by: STUDENT IN AN ORGANIZED HEALTH CARE EDUCATION/TRAINING PROGRAM

## 2023-11-22 PROCEDURE — 250N000013 HC RX MED GY IP 250 OP 250 PS 637: Performed by: STUDENT IN AN ORGANIZED HEALTH CARE EDUCATION/TRAINING PROGRAM

## 2023-11-22 PROCEDURE — 80048 BASIC METABOLIC PNL TOTAL CA: CPT | Performed by: STUDENT IN AN ORGANIZED HEALTH CARE EDUCATION/TRAINING PROGRAM

## 2023-11-22 PROCEDURE — 999N000157 HC STATISTIC RCP TIME EA 10 MIN

## 2023-11-22 PROCEDURE — 250N000009 HC RX 250: Performed by: STUDENT IN AN ORGANIZED HEALTH CARE EDUCATION/TRAINING PROGRAM

## 2023-11-22 PROCEDURE — 36415 COLL VENOUS BLD VENIPUNCTURE: CPT | Performed by: EMERGENCY MEDICINE

## 2023-11-22 PROCEDURE — 93306 TTE W/DOPPLER COMPLETE: CPT | Mod: 26 | Performed by: INTERNAL MEDICINE

## 2023-11-22 PROCEDURE — 36415 COLL VENOUS BLD VENIPUNCTURE: CPT | Performed by: STUDENT IN AN ORGANIZED HEALTH CARE EDUCATION/TRAINING PROGRAM

## 2023-11-22 PROCEDURE — 85014 HEMATOCRIT: CPT | Performed by: STUDENT IN AN ORGANIZED HEALTH CARE EDUCATION/TRAINING PROGRAM

## 2023-11-22 PROCEDURE — 94640 AIRWAY INHALATION TREATMENT: CPT

## 2023-11-22 PROCEDURE — 120N000013 HC R&B IMCU

## 2023-11-22 RX ORDER — IPRATROPIUM BROMIDE AND ALBUTEROL SULFATE 2.5; .5 MG/3ML; MG/3ML
3 SOLUTION RESPIRATORY (INHALATION)
Status: DISCONTINUED | OUTPATIENT
Start: 2023-11-22 | End: 2023-11-23

## 2023-11-22 RX ADMIN — SENNOSIDES AND DOCUSATE SODIUM 1 TABLET: 8.6; 5 TABLET ORAL at 21:17

## 2023-11-22 RX ADMIN — HEPARIN SODIUM 2100 UNITS/HR: 10000 INJECTION, SOLUTION INTRAVENOUS at 16:52

## 2023-11-22 RX ADMIN — IPRATROPIUM BROMIDE AND ALBUTEROL SULFATE 3 ML: .5; 3 SOLUTION RESPIRATORY (INHALATION) at 09:01

## 2023-11-22 RX ADMIN — TRAZODONE HYDROCHLORIDE 100 MG: 100 TABLET ORAL at 21:17

## 2023-11-22 RX ADMIN — HYDROMORPHONE HYDROCHLORIDE 0.5 MG: 1 INJECTION, SOLUTION INTRAMUSCULAR; INTRAVENOUS; SUBCUTANEOUS at 03:00

## 2023-11-22 RX ADMIN — IPRATROPIUM BROMIDE AND ALBUTEROL SULFATE 3 ML: .5; 3 SOLUTION RESPIRATORY (INHALATION) at 11:57

## 2023-11-22 RX ADMIN — ESCITALOPRAM OXALATE 10 MG: 10 TABLET ORAL at 21:17

## 2023-11-22 RX ADMIN — HEPARIN SODIUM 1800 UNITS/HR: 10000 INJECTION, SOLUTION INTRAVENOUS at 06:16

## 2023-11-22 RX ADMIN — OXYCODONE HYDROCHLORIDE 5 MG: 5 TABLET ORAL at 13:34

## 2023-11-22 RX ADMIN — PERFLUTREN 1.5 ML: 6.52 INJECTION, SUSPENSION INTRAVENOUS at 11:15

## 2023-11-22 RX ADMIN — Medication 3 MG: at 21:17

## 2023-11-22 RX ADMIN — IPRATROPIUM BROMIDE AND ALBUTEROL SULFATE 3 ML: .5; 3 SOLUTION RESPIRATORY (INHALATION) at 15:41

## 2023-11-22 RX ADMIN — OXYCODONE HYDROCHLORIDE 5 MG: 5 TABLET ORAL at 21:17

## 2023-11-22 RX ADMIN — OXYCODONE HYDROCHLORIDE 5 MG: 5 TABLET ORAL at 07:35

## 2023-11-22 RX ADMIN — IPRATROPIUM BROMIDE AND ALBUTEROL SULFATE 3 ML: .5; 3 SOLUTION RESPIRATORY (INHALATION) at 20:31

## 2023-11-22 ASSESSMENT — ACTIVITIES OF DAILY LIVING (ADL)
TOILETING_ISSUES: NO
ADLS_ACUITY_SCORE: 35
ADLS_ACUITY_SCORE: 35
ADLS_ACUITY_SCORE: 37
CHANGE_IN_FUNCTIONAL_STATUS_SINCE_ONSET_OF_CURRENT_ILLNESS/INJURY: YES
WALKING_OR_CLIMBING_STAIRS: AMBULATION DIFFICULTY, REQUIRES EQUIPMENT;STAIR CLIMBING DIFFICULTY, REQUIRES EQUIPMENT;TRANSFERRING DIFFICULTY, REQUIRES EQUIPMENT
ADLS_ACUITY_SCORE: 35
ADLS_ACUITY_SCORE: 37
FALL_HISTORY_WITHIN_LAST_SIX_MONTHS: NO
ADLS_ACUITY_SCORE: 37
ADLS_ACUITY_SCORE: 37
WEAR_GLASSES_OR_BLIND: YES
ADLS_ACUITY_SCORE: 35
ADLS_ACUITY_SCORE: 24
DOING_ERRANDS_INDEPENDENTLY_DIFFICULTY: NO
DIFFICULTY_EATING/SWALLOWING: NO
EQUIPMENT_CURRENTLY_USED_AT_HOME: CANE, STRAIGHT
CONCENTRATING,_REMEMBERING_OR_MAKING_DECISIONS_DIFFICULTY: NO
DRESSING/BATHING_DIFFICULTY: NO
ADLS_ACUITY_SCORE: 37
DIFFICULTY_COMMUNICATING: NO
HEARING_DIFFICULTY_OR_DEAF: NO
WALKING_OR_CLIMBING_STAIRS_DIFFICULTY: YES

## 2023-11-22 NOTE — H&P
[facility]    History and Physical - Hospitalist Service       Date of Admission:  11/21/2023    Assessment & Plan      Mesfin Lee is a 73 year old male admitted on 11/21/2023. He has a pmhx of mdd, insomnia, htn, prior clots, noted w/ RBBB, cirrhotic liver, s/p right colectomy for colon cancer earlier this year; who is admitted 11/21/2023 on a heparin drip after being found with Nonocclusive to occlusive DVT extending from the mid right femoral vein into the popliteal and calf veins and subsequently Acute bilateral pulmonary emboli with secondary signs of right heart strain. On admission pt is stable, though he is having frequent PVCs but EKG appears stable.   ----    Bilateral PE and dvt in right femoral; acute respiratory failure w/ hypoxia 2/2 PE  Right heart strain, known rbbb on EKG; frequent pvcs  A- on heparin drip  P:  - admit to hospital  -continue heparin  - monitor clinically on heparin, eventually transition to po regimen  - consider heme/onc input  - echocardiogram, increased sensitivity for right heart strain and follow up on strain  - prn oxycodone and tylenol    Mdd  Insomnia  A/p- stable, cont lexapro and trazodone, add prn melatonin    Htn  A/p- stable, appropriate for age range; would consider hydralazine if elevated overnight    cirrhotic liver  A/p- noted, follow clinically    s/p right colectomy for colon cancer  A/p- noted, could be contributory to clot risk stratification           Diet: Combination Diet Low Saturated Fat Na <2400mg Diet, No Caffeine Diet   DVT Prophylaxis: Pneumatic Compression Devices, Ambulate every shift, and heparin drip ongoing  Rachel Catheter: Not present  Lines: None     Cardiac Monitoring: ACTIVE order. Indication: Tachyarrhythmias, acute (48 hours)  Code Status: Full Code     Clinically Significant Risk Factors Present on Admission                # Coagulation Defect: INR = 1.17 (Ref range: 0.85 - 1.15) and/or PTT = 26 Seconds (Ref  "range: 22 - 38 Seconds), will monitor for bleeding    # Hypertension: Noted on problem list      # Obesity: Estimated body mass index is 38.26 kg/m  as calculated from the following:    Height as of this encounter: 1.854 m (6' 1\").    Weight as of this encounter: 131.5 kg (290 lb).              Disposition Plan      Expected Discharge Date: 11/23/2023                  Haider Davis MD  Hospitalist Service  [facility]  Securely message with Cynny (more info)  Text page via [employer] Paging/Directory     ______________________________________________________________________    Chief Complaint   Leg pain in right     History is obtained from the patient    History of Present Illness   Mesfin Lee is a 73 year old male who as a pmhx of mdd, insomnia, htn, prior clots, noted w/ RBBB, cirrhotic liver, s/p right colectomy for colon cancer earlier this year; who is admitted 11/21/2023 on a heparin drip after being found with Nonocclusive to occlusive DVT extending from the mid right femoral vein into the popliteal and calf veins and subsequently Acute bilateral pulmonary emboli with secondary signs of right heart strain. On admission pt is stable, though he is having frequent PVCs but EKG appears stable.     On interview, the patient confirms the aforementioned and also reports primary issue bringing him in was the leg pain and that he has had prior clots and this felt like that 5 years ago; reports no other symptoms including no headaches, fevers, chills, chest pain or shortness of breath including tachypnea dyspnea or coughs, no abdominal pain, no diarrhea or constipation, no dysuria, no neurologic changes or sensory changes. The pt is full code.        Past Medical History    Past Medical History:   Diagnosis Date    Hepatitis C        Past Surgical History   Past Surgical History:   Procedure Laterality Date    US GUIDED NEEDLE PLACEMENT  1/18/2021    WRIST SURGERY         Prior to Admission " Medications   Prior to Admission Medications   Prescriptions Last Dose Informant Patient Reported? Taking?   Melatonin 10 MG TABS tablet 11/20/2023 at PM  Yes Yes   Sig: Take 10 mg by mouth at bedtime   acetaminophen (TYLENOL) 325 MG tablet Unknown  Yes Yes   Sig: Take 650 mg by mouth every 4 hours as needed for mild pain   escitalopram oxalate (LEXAPRO) 10 MG tablet 11/20/2023 at PM  Yes Yes   Sig: [ESCITALOPRAM OXALATE (LEXAPRO) 10 MG TABLET] Take 10 mg by mouth every evening.   prochlorperazine (COMPAZINE) 10 MG tablet Unknown at prn  Yes Yes   Sig: Take 10 mg by mouth every 6 hours as needed for nausea   traZODone (DESYREL) 50 MG tablet 11/20/2023 at PM  Yes Yes   Sig: Take 100 mg by mouth at bedtime      Facility-Administered Medications: None        Review of Systems    The 10 point Review of Systems is negative other than noted in the HPI or here.      Social History   I have reviewed this patient's social history and updated it with pertinent information if needed.  Social History     Tobacco Use    Smoking status: Former   Substance Use Topics    Alcohol use: Yes     Comment: Alcoholic Drinks/day: 2/3 days    Drug use: No         Allergies   No Known Allergies     Physical Exam   Vital Signs: Temp: 97.1  F (36.2  C) Temp src: Temporal BP: (!) 146/65 Pulse: 85   Resp: 21 SpO2: 91 % O2 Device: Nasal cannula Oxygen Delivery: 2 LPM  Weight: 290 lbs 0 oz    GENERAL:  Alert, appears comfortable, in no acute distress, appears stated age   HEAD:  Normocephalic, without obvious abnormality, atraumatic   EYES:  PERRL, conjunctiva/corneas clear, no scleral icterus, EOM's intact   NOSE: Nares normal, septum midline, mucosa normal, no drainage   THROAT: Lips, mucosa, and tongue normal; teeth and gums normal, mouth moist   NECK: Supple, symmetrical, trachea midline   BACK:   Symmetric, no curvature, ROM normal   LUNGS:   Clear to auscultation bilaterally, no rales, rhonchi, or wheezing, symmetric chest rise on  inhalation, respirations unlabored, on 2L NC   CHEST WALL:  No tenderness or conspicuous deformity   HEART:  Regular rate and rhythm, S1 and S2 normal, no murmur, rub, or gallop, no conspicuous jugular venous distention noted, peripheral pulses intact    ABDOMEN:   Soft, non-tender, distended, ascitic, bowel sounds auscultated in all four quadrants, no rebound or guarding   EXTREMITIES: Extremities normal, atraumatic, no cyanosis or edema    SKIN: Dry to touch, no exanthems in the visualized areas   NEURO: Alert, oriented x3, moves all four extremities of their own volition    PSYCH: Cooperative and behavior is appropriate        Medical Decision Making       82 MINUTES SPENT BY ME on the date of service doing chart review, history, exam, documentation & further activities per the note.      Data   ------------------------- PAST 24 HR DATA REVIEWED -----------------------------------------------    I have personally reviewed the following data over the past 24 hrs:    9.3  \   9.1 (L)   / 252     139 103 14.3 /  114 (H)   4.5 27 0.95 \     Trop: 19 BNP: 311     INR:  1.17 (H) PTT:  26   D-dimer:  N/A Fibrinogen:  N/A       Imaging results reviewed over the past 24 hrs:   Recent Results (from the past 24 hour(s))   US Lower Extremity Venous Duplex Right    Narrative    EXAM: US LOWER EXTREMITY VENOUS DUPLEX RIGHT  LOCATION: [facility]  DATE: 11/21/2023    INDICATION: History of DVT; increasing swelling right leg last few days  COMPARISON: 01/16/2022  TECHNIQUE: Venous Duplex ultrasound of the right lower extremity with and without compression, augmentation and duplex. Color flow and spectral Doppler with waveform analysis performed.    FINDINGS: Exam includes the common femoral, femoral, popliteal, and contralateral common femoral veins as well as segmentally visualized deep calf veins and greater saphenous vein.     RIGHT: Nonocclusive to occlusive DVT extending from the mid right femoral  vein into the popliteal and calf veins. No superficial thrombophlebitis. No popliteal cyst.      Impression    IMPRESSION:  Nonocclusive to occlusive DVT extending from the mid right femoral vein into the popliteal and calf veins.    Findings discussed with La Solis PA-C, of the ED at 1733 hours.    CT Chest Pulmonary Embolism w Contrast   Result Value    Radiologist flags New diagnosis of pulmonary embolism (AA)    Narrative    EXAM: CT CHEST PULMONARY EMBOLISM W CONTRAST  LOCATION: [facility]  DATE: 11/21/2023    INDICATION: shortness of breath, new hypoxia 87  room air  COMPARISON: 1/15/2022  TECHNIQUE: CT chest pulmonary angiogram during arterial phase injection of IV contrast. Multiplanar reformats and MIP reconstructions were performed. Dose reduction techniques were used.   CONTRAST: Isovue 370 75mL    FINDINGS:  ANGIOGRAM CHEST: Extensive filling defects within the lobar, segmental, and subsegmental branches of the right pulmonary arteries, as well as segmental and subsegmental branches in the left lower lobe. Thoracic aorta is negative for dissection. RV/LV   ratio abnormal, greater than 1.0.    LUNGS AND PLEURA: Normal.    MEDIASTINUM/AXILLAE: Normal.    CORONARY ARTERY CALCIFICATION: Mild.    UPPER ABDOMEN: Cirrhotic morphology of the liver.    MUSCULOSKELETAL: Unchanged mild compression deformities in the lower thoracic spine.      Impression    IMPRESSION:  Acute bilateral pulmonary emboli with secondary signs of right heart strain.    [Critical Result: New diagnosis of pulmonary embolism]    Finding was identified on 11/21/2023 7:17 PM CST.     Dr. Aranda was contacted by me on 11/21/2023 7:27 PM CST and verbalized understanding of the critical result.

## 2023-11-22 NOTE — ED NOTES
AIDET performed, white board updated for rounding. Patient updated on plan of care. Patient's pain assessed. Call light within reach, bed in low position, side rails up. Visitor at bedside: none

## 2023-11-22 NOTE — PROGRESS NOTES
Pt is currently on a 2L NC.  Sating 94-95%, HR 67-68, RR 20-22, BBS- Clear/Diminished.  Duoneb ordered QID.  RT will continue to monitor.

## 2023-11-22 NOTE — ED PROVIDER NOTES
"History:  I have seen the patient with La Solis PA-C and agree with their assessment, physical, and plan.  I was present for key parts of the physical and any procedures performed.  I personally saw the patient and performed a substantive portion of the visit including all aspects of the medical decision making.  My additional impression on seeing the patient is that Mesfin Lee is a 73 year old  who presents for evaluation of right-sided lower leg pain progressive over the last 3 to 4 days.      States that he initially thought maybe he had just pulled a muscle as he had a dull ache but this progressed to throbbing which increases with any movement. He often has to sit and \"jiggle\" the leg to help improve his discomfort. He feels that the leg is more swollen than his unaffected side. Today, pain now has traveled from the calf up to the inner thigh. He does have a history of a blood clot which was apparently provoked by hospitalization in the past and states that this feels similar.  He is not currently on any anticoagulation. He did have right colectomy for colon cancer 7 weeks ago. Planned to start oral chemo tomorrow.  He has not had any other symptoms of illness such as fever, sore throat, cough. He does state that he has unchanged shortness of breath which has been ongoing. He does not use any oxygen at home. No known ill contacts. He reports that he quit drinking 3 years ago and no longer uses tobacco.     Of note, the patient reports taking several medications before sleeping at night including Lexapro and melatonin. He denies use of a CPAP machine.     Medical Decision Making:  ED Course as of 11/21/23 2121 Tue Nov 21, 2023 1927 Patient has extensive pe throughout r lung, lobar segmental and subsegmental with signs of r heart strain on ct.  Liver is cirrhotic.      I was consulted on this patient by our physician assistant after his initial ultrasound of his leg showed DVT and he was " found to be hypoxic.  He is not in any respiratory distress at all and his blood pressure is if anything elevated.  He does have possible other causes to have had hypoxia but now that we have the CT result we know that this is due to PE.  As there is signs of right heart strain and he has cirrhotic liver will plan to start him for now on heparin and admit to the hospital.   2020 Patient admitted to hospitalist on heparin.  His EKG has not yet been done and I did add on troponin and BNP to continue to follow the degree of cardiac strain that may or may not be present.  Otherwise he is vitally stable at this time.  He denies cp but was sob since last night.  Would like something for his leg pain.  Dilaudid ordered.         Medications:  Medications   heparin 25,000 units in 0.45% NaCl 250 mL ANTICOAGULANT infusion (1,800 Units/hr Intravenous $New Bag 11/21/23 2020)   HYDROmorphone (PF) (DILAUDID) injection 0.5 mg (0.5 mg Intravenous $Given 11/21/23 2116)   iopamidol (ISOVUE-370) solution 75 mL (75 mLs Intravenous $Given 11/21/23 1852)   heparin ANTICOAGULANT loading dose for  HIGH INTENSITY TREATMENT* Give BEFORE starting heparin infusion (8,000 Units Intravenous $Given 11/21/23 2019)       Additional ROS:no chest pain.   .  All other systems are otherwise negative.    Additional PE: Constitutional:   in nad obese male diffuse tremor sitting back in bd   HENT:  Normocephalic, posterior pharynx wnl, mucous membranes moist and dark pink   Eyes:  PERRL, EOMI, Conjunctiva normal, No discharge, no scleral icterus.  Respiratory:  Breathing easily, cta decreased b bases, no accessory muscle usage, tachypneic   Cardiovascular:  rrr nl s1s2 0 murmurs, rubs, or gallops.  Peripheral pulses dp, pt, and radial are wnl.  Has woody chronic appearing peripheral edema   GI:  Bowel sounds normal, Soft, No tenderness, protuberant abd.  :No CVA tenderness.   Musculoskeletal:  Moves all extremities.  No erythematous or swollen major  joints,   Integument:  dry skin  Lymphatic:  No cervical lymphadenopathy  Neurologic:  Alert & oriented x 3, Normal motor function, Normal sensory function, No focal deficits noted. Normal speech.  Psychiatric:  Affect normal, Judgment normal, Mood normal.     Results for orders placed or performed during the hospital encounter of 11/21/23   US Lower Extremity Venous Duplex Right     Status: None    Narrative    EXAM: US LOWER EXTREMITY VENOUS DUPLEX RIGHT  LOCATION: [facility]  DATE: 11/21/2023    INDICATION: History of DVT; increasing swelling right leg last few days  COMPARISON: 01/16/2022  TECHNIQUE: Venous Duplex ultrasound of the right lower extremity with and without compression, augmentation and duplex. Color flow and spectral Doppler with waveform analysis performed.    FINDINGS: Exam includes the common femoral, femoral, popliteal, and contralateral common femoral veins as well as segmentally visualized deep calf veins and greater saphenous vein.     RIGHT: Nonocclusive to occlusive DVT extending from the mid right femoral vein into the popliteal and calf veins. No superficial thrombophlebitis. No popliteal cyst.      Impression    IMPRESSION:  Nonocclusive to occlusive DVT extending from the mid right femoral vein into the popliteal and calf veins.    Findings discussed with La Solis PA-C, of the ED at 1733 hours.    CT Chest Pulmonary Embolism w Contrast     Status: Abnormal   Result Value Ref Range    Radiologist flags New diagnosis of pulmonary embolism (AA)     Narrative    EXAM: CT CHEST PULMONARY EMBOLISM W CONTRAST  LOCATION: [facility]  DATE: 11/21/2023    INDICATION: shortness of breath, new hypoxia 87  room air  COMPARISON: 1/15/2022  TECHNIQUE: CT chest pulmonary angiogram during arterial phase injection of IV contrast. Multiplanar reformats and MIP reconstructions were performed. Dose reduction techniques were used.   CONTRAST: Isovue  370 75mL    FINDINGS:  ANGIOGRAM CHEST: Extensive filling defects within the lobar, segmental, and subsegmental branches of the right pulmonary arteries, as well as segmental and subsegmental branches in the left lower lobe. Thoracic aorta is negative for dissection. RV/LV   ratio abnormal, greater than 1.0.    LUNGS AND PLEURA: Normal.    MEDIASTINUM/AXILLAE: Normal.    CORONARY ARTERY CALCIFICATION: Mild.    UPPER ABDOMEN: Cirrhotic morphology of the liver.    MUSCULOSKELETAL: Unchanged mild compression deformities in the lower thoracic spine.      Impression    IMPRESSION:  Acute bilateral pulmonary emboli with secondary signs of right heart strain.    [Critical Result: New diagnosis of pulmonary embolism]    Finding was identified on 11/21/2023 7:17 PM CST.     Dr. Aranda was contacted by me on 11/21/2023 7:27 PM CST and verbalized understanding of the critical result.   Basic metabolic panel     Status: Abnormal   Result Value Ref Range    Sodium 139 135 - 145 mmol/L    Potassium 4.5 3.4 - 5.3 mmol/L    Chloride 103 98 - 107 mmol/L    Carbon Dioxide (CO2) 27 22 - 29 mmol/L    Anion Gap 9 7 - 15 mmol/L    Urea Nitrogen 14.3 8.0 - 23.0 mg/dL    Creatinine 0.95 0.67 - 1.17 mg/dL    GFR Estimate 85 >60 mL/min/1.73m2    Calcium 9.1 8.8 - 10.2 mg/dL    Glucose 114 (H) 70 - 99 mg/dL   INR     Status: Abnormal   Result Value Ref Range    INR 1.17 (H) 0.85 - 1.15   Partial thromboplastin time     Status: Normal   Result Value Ref Range    aPTT 26 22 - 38 Seconds   CBC with platelets and differential     Status: Abnormal   Result Value Ref Range    WBC Count 9.3 4.0 - 11.0 10e3/uL    RBC Count 4.53 4.40 - 5.90 10e6/uL    Hemoglobin 9.1 (L) 13.3 - 17.7 g/dL    Hematocrit 31.8 (L) 40.0 - 53.0 %    MCV 70 (L) 78 - 100 fL    MCH 20.1 (L) 26.5 - 33.0 pg    MCHC 28.6 (L) 31.5 - 36.5 g/dL    RDW 21.2 (H) 10.0 - 15.0 %    Platelet Count 252 150 - 450 10e3/uL    % Neutrophils 46 %    % Lymphocytes 42 %    % Monocytes 9 %    %  Eosinophils 3 %    % Basophils 0 %    % Immature Granulocytes 0 %    NRBCs per 100 WBC 0 <1 /100    Absolute Neutrophils 4.2 1.6 - 8.3 10e3/uL    Absolute Lymphocytes 3.9 0.8 - 5.3 10e3/uL    Absolute Monocytes 0.9 0.0 - 1.3 10e3/uL    Absolute Eosinophils 0.2 0.0 - 0.7 10e3/uL    Absolute Basophils 0.0 0.0 - 0.2 10e3/uL    Absolute Immature Granulocytes 0.0 <=0.4 10e3/uL    Absolute NRBCs 0.0 10e3/uL   Symptomatic Influenza A/B, RSV, & SARS-CoV2 PCR (COVID-19) Nasopharyngeal     Status: Normal    Specimen: Nasopharyngeal; Swab   Result Value Ref Range    Influenza A PCR Negative Negative    Influenza B PCR Negative Negative    RSV PCR Negative Negative    SARS CoV2 PCR Negative Negative    Narrative    Testing was performed using the Xpert Xpress CoV2/Flu/RSV Assay on the [employer] GeneXpert Instrument. This test should be ordered for the detection of SARS-CoV-2, influenza, and RSV viruses in individuals who meet clinical and/or epidemiological criteria. Test performance is unknown in asymptomatic patients. This test is for in vitro diagnostic use under the FDA EUA for laboratories certified under CLIA to perform high or moderate complexity testing. This test has not been FDA cleared or approved. A negative result does not rule out the presence of PCR inhibitors in the specimen or target RNA in concentration below the limit of detection for the assay. If only one viral target is positive but coinfection with multiple targets is suspected, the sample should be re-tested with another FDA cleared, approved, or authorized test, if coinfection would change clinical management. This test was validated by the [facility]. These laboratories are certified under the Clinical Laboratory Improvement Amendments of 1988 (CLIA-88) as qualified to perform high complexity laboratory testing.   CBC with platelets differential     Status: Abnormal    Narrative    The following orders were created for panel order CBC  with platelets differential.  Procedure                               Abnormality         Status                     ---------                               -----------         ------                     CBC with platelets and d...[[id number]]  Abnormal            Final result                 Please view results for these tests on the individual orders.         EKG:  Performed at 2102;  Impression:  nsr with first degree av block, rbbb, rate of 83.  Pr 212, qrs 148ms, qtc 533ms, prt axes 70 2 18.  Compared to 1/15/22 pvc's are no longer present, rbbb is not significantly changed.      I have independently reviewed and interpreted the EKG(s) documented above.          Diagnosis:    1. Bilateral pulmonary embolism (H)    2. Acute deep vein thrombosis (DVT) of proximal vein of right lower extremity (H)    3. Hypoxia          I, Bette Barrera, am serving as a scribe to document services personally performed by Yessenia Aranda MD, based on my observations and the provider's statements to me. I, Yessenia Aranda MD, attest that Bette Barrera is acting in a scribe capacity, has observed my performance of the services and has documented them in accordance with my direction.         Yessenia Aranda MD  11/21/23 2121

## 2023-11-22 NOTE — ED NOTES
Patient reports feeling uncomfortable. Asking for pain medication for right leg throbbing. Denies chest pain. Still feeling short of breath. On 2 LPM nasal canula.  ER team updated.

## 2023-11-22 NOTE — PLAN OF CARE
Problem: VTE (Venous Thromboembolism)  Goal: Tissue Perfusion  Outcome: Progressing     Alert and oriented x 4. Lung sounds diminished, endorses shortness of breath but no chest discomfort. Here, patient is using 2L nasal canula, not on O2 at baseline. Patient stands up at the side of the bed to use the urinal. Next anti xa level will be drawn at 07:50am, in the meantime, heparin going at 1800 units (18mL/hr).  Has gotten medication for intermittent R leg throbbing and it has helped with pain relief.

## 2023-11-22 NOTE — PHARMACY-ADMISSION MEDICATION HISTORY
Pharmacist Admission Medication History    Admission medication history is complete. The information provided in this note is only as accurate as the sources available at the time of the update.    Information Source(s): Patient and CareEverywhere/SureScripts via in-person    Pertinent Information: N/A    Changes made to PTA medication list:  Added: Compazine  Deleted: oxycodone, flomax  Changed: melatonin dose, trazodone dose    Medication Affordability:  Not including over the counter (OTC) medications, was there a time in the past 3 months when you did not take your medications as prescribed because of cost?: No    Allergies reviewed with patient and updates made in EHR: yes    Medication History Completed By: Butch Viramontes [facility] 11/21/2023 8:27 PM    PTA Med List   Medication Sig Last Dose    acetaminophen (TYLENOL) 325 MG tablet Take 650 mg by mouth every 4 hours as needed for mild pain Unknown    escitalopram oxalate (LEXAPRO) 10 MG tablet [ESCITALOPRAM OXALATE (LEXAPRO) 10 MG TABLET] Take 10 mg by mouth every evening. 11/20/2023 at PM    Melatonin 10 MG TABS tablet Take 10 mg by mouth at bedtime 11/20/2023 at PM    prochlorperazine (COMPAZINE) 10 MG tablet Take 10 mg by mouth every 6 hours as needed for nausea Unknown at prn    traZODone (DESYREL) 50 MG tablet Take 100 mg by mouth at bedtime 11/20/2023 at PM

## 2023-11-22 NOTE — PROGRESS NOTES
"/67 (BP Location: Right arm)   Pulse 78   Temp 98.1  F (36.7  C) (Oral)   Resp 22   Ht 1.854 m (6' 1\")   Wt 138.2 kg (304 lb 9.6 oz)   SpO2 92%   BMI 40.19 kg/m      Pt was on 2 lpm NC when last seen. BS were diminished pre and post neb. Pt had a non productive cough. Rt will continue to follow.   "

## 2023-11-22 NOTE — PROGRESS NOTES
[facility]    Medicine Progress Note - Hospitalist Service    Date of Admission:  11/21/2023    Assessment & Plan    Mesfin Lee is a 73 year old male admitted on 11/21/2023. He has a pmhx of mdd, insomnia, htn, prior clots, noted w/ RBBB, cirrhotic liver, s/p right colectomy for colon cancer earlier this year in October; who is admitted 11/21/2023 on a heparin drip after being found with Nonocclusive to occlusive DVT extending from the mid right femoral vein into the popliteal and calf veins and also Acute bilateral pulmonary emboli with secondary signs of right heart strain. On admission pt was hypoxic but stable on 2L of 02 via NC, also noted with frequent PVCs but EKG appears stable. Notably, received lovenox x28 days which completed this past weekend.     Echo TAPSE is normal, which is consistent with normal right ventricular systolic function. With ongoing hypoxia, adding duonebs for symptoms support. Given a potential question of anticoagulation medication prophylaxis failure, will consult Hematology/Oncology to review case. Pt remains on heparin and hypoxic.    ----    Bilateral PE and dvt in right femoral; acute respiratory failure w/ hypoxia 2/2 PE  Right heart strain, known rbbb on EKG; frequent pvcs  s/p right colectomy for colon cancer in October, discharged w/ lovenox   Anemia  A- on heparin drip. See direclty above, asking heme/onc to see. In theory he was on prophylactic lovenox per colorectal surgery but still developed multiple clots.   P:  -continue heparin w/ labs to adjust rate  - monitor clinically on heparin, eventually transition to po regimen   - however, given potential question of anticoagulation medication prophylaxis failure, will ask specialist to see  - Consult to heme/onc 11/22/2023   - echocardiogram, increased sensitivity for right heart strain and follow up on strain   - it showed right ventricle is not well visualized. However, TAPSE is normal, which  "is consistent with normal right ventricular systolic function  - prn oxycodone and tylenol  - start duonebs QID w/ RT to adjust   - hgb downtrending so monitor for signs/symptoms of bleed while on blood thinner   - transfuse if < 7   - recheck in afternoon; still downtrending so recheck tonight 6pm and again in AM  Mdd  Insomnia  A/p- stable, cont lexapro and trazodone, added prn melatonin    Htn  A/p- stable, appropriate for age range; would consider hydralazine if elevated and symptomatic (ie headaches or vision changes)    cirrhotic liver  A/p- noted, follow clinically         Diet: Combination Diet Low Saturated Fat Na <2400mg Diet, No Caffeine Diet    DVT Prophylaxis: Pneumatic Compression Devices, Ambulate every shift, and he is on heparin drip  Rachel Catheter: Not present  Lines: None     Cardiac Monitoring: ACTIVE order. Indication: Tachyarrhythmias, acute (48 hours)  Code Status: Full Code      Clinically Significant Risk Factors Present on Admission                # Coagulation Defect: INR = 1.17 (Ref range: 0.85 - 1.15) and/or PTT = 26 Seconds (Ref range: 22 - 38 Seconds), will monitor for bleeding    # Hypertension: Noted on problem list      # Obesity: Estimated body mass index is 38.26 kg/m  as calculated from the following:    Height as of this encounter: 1.854 m (6' 1\").    Weight as of this encounter: 131.5 kg (290 lb).              Disposition Plan      Expected Discharge Date: 11/23/2023                    Haider Davis MD  Hospitalist Service  [facility]  Securely message with [employer] (more info)  Text page via [employer] Paging/Directory   ______________________________________________________________________    Interval History   - no acute events ov  -  however today pt is feeling more short of breath, no chest pain  - still on 2L but subjectively feels harder to breathe  - discussed duonebs and having RT see  - discussed w/ bedside RN     Physical Exam   Vital Signs: " Temp: 98.2  F (36.8  C) Temp src: Oral BP: 114/57 Pulse: 67   Resp: 20 SpO2: 95 % O2 Device: Nasal cannula Oxygen Delivery: 2 LPM  Weight: 290 lbs 0 oz    General: alert, oriented, and in no acute distress  Pulmonary: coarse breath sounds, decreased at bases, no rales or wheezes; on 2L but increased work of breathing speaking  CVS: regular rate and rhythm though tele shows PVCs, no murmurs, rubs, or gallops; no blatant jugular venous distention; no extremity edema and extremities are warm to the touch  GI: soft, nontender, BS+, no rebound or guarding, no conspicuous organomegaly   Neuro: nonfocal, moves all extremities of own volition  Psych: appropriate     Medical Decision Making       53 MINUTES SPENT BY ME on the date of service doing chart review, history, exam, documentation & further activities per the note.      Data   ------------------------- PAST 24 HR DATA REVIEWED -----------------------------------------------    I have personally reviewed the following data over the past 24 hrs:    6.5  \   7.9 (L)   / 221     136 102 13.7 /  104 (H)   4.5 27 0.93 \     Trop: 19 BNP: 311     INR:  1.17 (H) PTT:  26   D-dimer:  N/A Fibrinogen:  N/A       Imaging results reviewed over the past 24 hrs:   Recent Results (from the past 24 hour(s))   US Lower Extremity Venous Duplex Right    Narrative    EXAM: US LOWER EXTREMITY VENOUS DUPLEX RIGHT  LOCATION: [facility]  DATE: 11/21/2023    INDICATION: History of DVT; increasing swelling right leg last few days  COMPARISON: 01/16/2022  TECHNIQUE: Venous Duplex ultrasound of the right lower extremity with and without compression, augmentation and duplex. Color flow and spectral Doppler with waveform analysis performed.    FINDINGS: Exam includes the common femoral, femoral, popliteal, and contralateral common femoral veins as well as segmentally visualized deep calf veins and greater saphenous vein.     RIGHT: Nonocclusive to occlusive DVT extending  from the mid right femoral vein into the popliteal and calf veins. No superficial thrombophlebitis. No popliteal cyst.      Impression    IMPRESSION:  Nonocclusive to occlusive DVT extending from the mid right femoral vein into the popliteal and calf veins.    Findings discussed with aL Solis PA-C, of the ED at 1733 hours.    CT Chest Pulmonary Embolism w Contrast   Result Value    Radiologist flags New diagnosis of pulmonary embolism (AA)    Narrative    EXAM: CT CHEST PULMONARY EMBOLISM W CONTRAST  LOCATION: [facility]  DATE: 11/21/2023    INDICATION: shortness of breath, new hypoxia 87  room air  COMPARISON: 1/15/2022  TECHNIQUE: CT chest pulmonary angiogram during arterial phase injection of IV contrast. Multiplanar reformats and MIP reconstructions were performed. Dose reduction techniques were used.   CONTRAST: Isovue 370 75mL    FINDINGS:  ANGIOGRAM CHEST: Extensive filling defects within the lobar, segmental, and subsegmental branches of the right pulmonary arteries, as well as segmental and subsegmental branches in the left lower lobe. Thoracic aorta is negative for dissection. RV/LV   ratio abnormal, greater than 1.0.    LUNGS AND PLEURA: Normal.    MEDIASTINUM/AXILLAE: Normal.    CORONARY ARTERY CALCIFICATION: Mild.    UPPER ABDOMEN: Cirrhotic morphology of the liver.    MUSCULOSKELETAL: Unchanged mild compression deformities in the lower thoracic spine.      Impression    IMPRESSION:  Acute bilateral pulmonary emboli with secondary signs of right heart strain.    [Critical Result: New diagnosis of pulmonary embolism]    Finding was identified on 11/21/2023 7:17 PM CST.     Dr. Aranda was contacted by me on 11/21/2023 7:27 PM CST and verbalized understanding of the critical result.   Echocardiogram Complete   Result Value    LVEF  60-65%    Narrative    [id number]  [id number]  [id number]  [id number]^JOSELUIS^JOSUE^[place] [address]      Name: HIRA JERONIMO  MRN: [id number]  : 1950  Study Date: 2023 11:08 AM  Age: 73 yrs  Gender: Male  Patient Location: [place]  Reason For Study: Pulmonary Emboli  Ordering Physician: JOSUE HUGGINS  Performed By: CHANEL     BSA: 2.5 m2  Height: 73 in  Weight: 290 lb  HR: 80  BP: 157/74 mmHg  ______________________________________________________________________________  Procedure  Complete Portable Echo Adult. Definity (NDC #72540-917) given intravenously.  Technically difficult study.Extremely poor acoustic windows. Compared to prior  study, there is no significant change.  ______________________________________________________________________________  Interpretation Summary     Technically difficult study.Extremely poor acoustic windows. Most cardiac  structures are not well visualized.  Left ventricular function is normal.The ejection fraction is 60-65%.  The right ventricle is not well visualized. TAPSE is normal, which is  consistent with normal right ventricular systolic function.  Mild valvular aortic stenosis.  Compared to prior study, there is likely no significant change.     ______________________________________________________________________________  Left Ventricle  The left ventricle is normal in size. Left ventricular function is normal.The  ejection fraction is 60-65%. Left ventricular diastolic function is normal. No  regional wall motion abnormalities noted.     Right Ventricle  The right ventricle is not well visualized. TAPSE is normal, which is  consistent with normal right ventricular systolic function.     Atria  The left atrium is not well visualized. Right atrium not well visualized.     Mitral Valve  The mitral valve is not well visualized. There is no mitral regurgitation  noted. There is no mitral valve stenosis.     Tricuspid Valve  The tricuspid valve is not well visualized. Right ventricular systolic  pressure could not be approximated due to inadequate tricuspid  regurgitation.     Aortic Valve  The aortic valve is not well visualized. Mild valvular aortic stenosis.     Pulmonic Valve  The pulmonic valve is not well visualized.     Vessels  IVC diameter <2.1 cm collapsing >50% with sniff suggests a normal RA pressure  of 3 mmHg.     Pericardium  There is no pericardial effusion.     Rhythm  Sinus rhythm was noted.  ______________________________________________________________________________  MMode/2D Measurements & Calculations     TAPSE: 2.5 cm     Time Measurements  MM HR: 67.0 BPM     Doppler Measurements & Calculations  MV E max adolfo: 84.9 cm/sec  MV A max adolfo: 92.0 cm/sec  MV E/A: 0.92  MV dec slope: 339.0 cm/sec2  MV dec time: 0.25 sec  Ao V2 max: 263.0 cm/sec  Ao max P.0 mmHg  Ao V2 mean: 185.0 cm/sec  Ao mean P.0 mmHg  Ao V2 VTI: 62.1 cm  LV V1 max PG: 3.4 mmHg  LV V1 max: 92.7 cm/sec  LV V1 VTI: 22.3 cm  AV Adolfo Ratio (DI): 0.35  E/E' avg: 10.5  Lateral E/e': 10.0  Medial E/e': 11.0  RV S Adolfo: 10.7 cm/sec     ______________________________________________________________________________  Report approved by: Ryan Ruiz 2023 01:56 PM

## 2023-11-23 LAB
ERYTHROCYTE [DISTWIDTH] IN BLOOD BY AUTOMATED COUNT: 20.6 % (ref 10–15)
HCT VFR BLD AUTO: 27 % (ref 40–53)
HGB BLD-MCNC: 7.8 G/DL (ref 13.3–17.7)
MCH RBC QN AUTO: 20.2 PG (ref 26.5–33)
MCHC RBC AUTO-ENTMCNC: 28.9 G/DL (ref 31.5–36.5)
MCV RBC AUTO: 70 FL (ref 78–100)
NT-PROBNP SERPL-MCNC: 153 PG/ML (ref 0–900)
PLATELET # BLD AUTO: 222 10E3/UL (ref 150–450)
RBC # BLD AUTO: 3.87 10E6/UL (ref 4.4–5.9)
UFH PPP CHRO-ACNC: 0.41 IU/ML
UFH PPP CHRO-ACNC: 0.46 IU/ML
WBC # BLD AUTO: 7.9 10E3/UL (ref 4–11)

## 2023-11-23 PROCEDURE — 94640 AIRWAY INHALATION TREATMENT: CPT | Mod: 76

## 2023-11-23 PROCEDURE — 250N000012 HC RX MED GY IP 250 OP 636 PS 637: Performed by: STUDENT IN AN ORGANIZED HEALTH CARE EDUCATION/TRAINING PROGRAM

## 2023-11-23 PROCEDURE — 250N000013 HC RX MED GY IP 250 OP 250 PS 637: Performed by: STUDENT IN AN ORGANIZED HEALTH CARE EDUCATION/TRAINING PROGRAM

## 2023-11-23 PROCEDURE — 85520 HEPARIN ASSAY: CPT | Performed by: STUDENT IN AN ORGANIZED HEALTH CARE EDUCATION/TRAINING PROGRAM

## 2023-11-23 PROCEDURE — 83880 ASSAY OF NATRIURETIC PEPTIDE: CPT | Performed by: STUDENT IN AN ORGANIZED HEALTH CARE EDUCATION/TRAINING PROGRAM

## 2023-11-23 PROCEDURE — 36415 COLL VENOUS BLD VENIPUNCTURE: CPT | Performed by: STUDENT IN AN ORGANIZED HEALTH CARE EDUCATION/TRAINING PROGRAM

## 2023-11-23 PROCEDURE — 250N000009 HC RX 250: Performed by: STUDENT IN AN ORGANIZED HEALTH CARE EDUCATION/TRAINING PROGRAM

## 2023-11-23 PROCEDURE — 999N000156 HC STATISTIC RCP CONSULT EA 30 MIN

## 2023-11-23 PROCEDURE — 250N000011 HC RX IP 250 OP 636: Mod: JZ | Performed by: EMERGENCY MEDICINE

## 2023-11-23 PROCEDURE — 210N000002 HC R&B HEART CARE

## 2023-11-23 PROCEDURE — 94640 AIRWAY INHALATION TREATMENT: CPT

## 2023-11-23 PROCEDURE — 85014 HEMATOCRIT: CPT | Performed by: STUDENT IN AN ORGANIZED HEALTH CARE EDUCATION/TRAINING PROGRAM

## 2023-11-23 PROCEDURE — 999N000157 HC STATISTIC RCP TIME EA 10 MIN

## 2023-11-23 PROCEDURE — 99233 SBSQ HOSP IP/OBS HIGH 50: CPT | Performed by: STUDENT IN AN ORGANIZED HEALTH CARE EDUCATION/TRAINING PROGRAM

## 2023-11-23 RX ORDER — NALOXONE HYDROCHLORIDE 0.4 MG/ML
0.2 INJECTION, SOLUTION INTRAMUSCULAR; INTRAVENOUS; SUBCUTANEOUS
Status: DISCONTINUED | OUTPATIENT
Start: 2023-11-23 | End: 2023-11-28 | Stop reason: HOSPADM

## 2023-11-23 RX ORDER — PREDNISONE 20 MG/1
40 TABLET ORAL ONCE
Status: COMPLETED | OUTPATIENT
Start: 2023-11-23 | End: 2023-11-23

## 2023-11-23 RX ORDER — NALOXONE HYDROCHLORIDE 0.4 MG/ML
0.4 INJECTION, SOLUTION INTRAMUSCULAR; INTRAVENOUS; SUBCUTANEOUS
Status: DISCONTINUED | OUTPATIENT
Start: 2023-11-23 | End: 2023-11-28 | Stop reason: HOSPADM

## 2023-11-23 RX ORDER — IPRATROPIUM BROMIDE AND ALBUTEROL SULFATE 2.5; .5 MG/3ML; MG/3ML
3 SOLUTION RESPIRATORY (INHALATION)
Status: DISCONTINUED | OUTPATIENT
Start: 2023-11-23 | End: 2023-11-28 | Stop reason: HOSPADM

## 2023-11-23 RX ADMIN — HEPARIN SODIUM 2250 UNITS/HR: 10000 INJECTION, SOLUTION INTRAVENOUS at 13:13

## 2023-11-23 RX ADMIN — PREDNISONE 40 MG: 20 TABLET ORAL at 08:49

## 2023-11-23 RX ADMIN — SENNOSIDES AND DOCUSATE SODIUM 1 TABLET: 8.6; 5 TABLET ORAL at 13:19

## 2023-11-23 RX ADMIN — OXYCODONE HYDROCHLORIDE 5 MG: 5 TABLET ORAL at 22:51

## 2023-11-23 RX ADMIN — IPRATROPIUM BROMIDE AND ALBUTEROL SULFATE 3 ML: .5; 3 SOLUTION RESPIRATORY (INHALATION) at 07:33

## 2023-11-23 RX ADMIN — ESCITALOPRAM OXALATE 10 MG: 10 TABLET ORAL at 19:32

## 2023-11-23 RX ADMIN — Medication 3 MG: at 21:24

## 2023-11-23 RX ADMIN — HEPARIN SODIUM 2250 UNITS/HR: 10000 INJECTION, SOLUTION INTRAVENOUS at 02:56

## 2023-11-23 RX ADMIN — IPRATROPIUM BROMIDE AND ALBUTEROL SULFATE 3 ML: .5; 3 SOLUTION RESPIRATORY (INHALATION) at 21:33

## 2023-11-23 RX ADMIN — TRAZODONE HYDROCHLORIDE 100 MG: 100 TABLET ORAL at 21:24

## 2023-11-23 RX ADMIN — OXYCODONE HYDROCHLORIDE 5 MG: 5 TABLET ORAL at 15:28

## 2023-11-23 RX ADMIN — SENNOSIDES AND DOCUSATE SODIUM 2 TABLET: 8.6; 5 TABLET ORAL at 16:01

## 2023-11-23 RX ADMIN — OXYCODONE HYDROCHLORIDE 5 MG: 5 TABLET ORAL at 01:25

## 2023-11-23 ASSESSMENT — ACTIVITIES OF DAILY LIVING (ADL)
ADLS_ACUITY_SCORE: 24
DEPENDENT_IADLS:: INDEPENDENT
ADLS_ACUITY_SCORE: 24

## 2023-11-23 NOTE — PLAN OF CARE
Goal Outcome Evaluation:      Plan of Care Reviewed With: patient  Patient is alert and oriented and able to communicate his needs to staff. He has been resting in bed for most of the shift and c/o throbbing discomfort in his right lower leg. Medicated 1 time with Oxycodone 5 mg for pain relief. Patient slept after receiving pain medication. Heparin currently at 2250 units or 22.5 ml hour. Patient c/o constipation and feceived 1 Senna Tablet. Will continue to monitor.

## 2023-11-23 NOTE — H&P
[facility]      History and Physical -   Primary Oncologist - Dr Tafoya        Date of Admission:  11/21/2023  Date of Progress note: 11/23/2023    Assessment & Plan      Mesfin Lee is a 73 year old male admitted on 11/21/2023. He has a pmhx of mdd, insomnia, htn, prior clots, noted w/ RBBB, cirrhotic liver, s/p right colectomy for colon cancer earlier this year; who is admitted 11/21/2023 on a heparin drip after being found with Nonocclusive to occlusive DVT extending from the mid right femoral vein into the popliteal and calf veins and subsequently Acute bilateral pulmonary emboli with secondary signs of right heart strain.    History of recurrent colorectal cancer and was planning on starting Capecitabine as adjuvant therapy. Was to start next month.  He has been recovering well after his colon surgery. He has a previous history of DVT around 5 years ago after a similar surgery.     Bilateral PE and dvt in right femoral; acute respiratory failure w/ hypoxia 2/2 PE  Right heart strain, known rbbb on EKG; frequent pvcs    A/P:    DVT/PE - Would plan on continuing IV heparin for 48-72 hours and follow. Consider switching to long term anticoagulation with Eloquis or Xarelto for long term treatment after symptoms improve.   Follow cbc platelet count while on heparin. CBC on admission reviewed   Would likely delay any adjuvant chemotherapy for 4-6 weeks  Will follow     Lazaro Calle MD   [facility]   [phone]        ___________________________________________________________    Chief Complaint   Leg pain in right     History is obtained from the patient and chart review    Reviewed Radiology reports     History of Present Illness   Mesfin Lee is a 73 year old male who as a pmhx of mdd, insomnia, htn, prior clots, noted w/ RBBB, cirrhotic liver, s/p right colectomy for colon cancer earlier this year; who is admitted 11/21/2023 on a heparin drip after being found with Nonocclusive to occlusive DVT  extending from the mid right femoral vein into the popliteal and calf veins and subsequently Acute bilateral pulmonary emboli with secondary signs of right heart strain. On admission pt is stable, though he is having frequent PVCs but EKG appears stable.      -  reports primary issue bringing him in was the leg pain and that he has had prior clots and this felt like that 5 years ago; reports no other symptoms including no headaches, fevers, chills, chest pain or shortness of breath including tachypnea dyspnea or coughs, no abdominal pain, no diarrhea or constipation, no dysuria, no neurologic changes or sensory changes. The pt is full code.        Past Medical History    Past Medical History:   Diagnosis Date    Hepatitis C        Past Surgical History   Past Surgical History:   Procedure Laterality Date    US GUIDED NEEDLE PLACEMENT  1/18/2021    WRIST SURGERY         Prior to Admission Medications   Prior to Admission Medications   Prescriptions Last Dose Informant Patient Reported? Taking?   Melatonin 10 MG TABS tablet 11/20/2023 at PM  Yes Yes   Sig: Take 10 mg by mouth at bedtime   acetaminophen (TYLENOL) 325 MG tablet Unknown  Yes Yes   Sig: Take 650 mg by mouth every 4 hours as needed for mild pain   escitalopram oxalate (LEXAPRO) 10 MG tablet 11/20/2023 at PM  Yes Yes   Sig: [ESCITALOPRAM OXALATE (LEXAPRO) 10 MG TABLET] Take 10 mg by mouth every evening.   prochlorperazine (COMPAZINE) 10 MG tablet Unknown at prn  Yes Yes   Sig: Take 10 mg by mouth every 6 hours as needed for nausea   traZODone (DESYREL) 50 MG tablet 11/20/2023 at PM  Yes Yes   Sig: Take 100 mg by mouth at bedtime      Facility-Administered Medications: None        Review of Systems    The 10 point Review of Systems is negative other than noted in the HPI or here.      Social History   I have reviewed this patient's social history and updated it with pertinent information if needed.  Social History     Tobacco Use    Smoking status: Former    Substance Use Topics    Alcohol use: Yes     Comment: Alcoholic Drinks/day: 2/3 days    Drug use: No         Allergies   No Known Allergies     Physical Exam   Vital Signs: Temp: 98  F (36.7  C) Temp src: Oral BP: 130/73 Pulse: 79   Resp: 19 SpO2: 98 % O2 Device: Oxymask Oxygen Delivery: 1 LPM  Weight: 306 lbs 6.4 oz    GENERAL:  Alert, appears comfortable, in no acute distress, appears stated age O2 by FM    HEAD:  Normocephalic, without obvious abnormality, atraumatic   EYES:  PERRL, conjunctiva/corneas clear, no scleral icterus, EOM's intact   NOSE: Nares normal, septum midline, mucosa normal, no drainage   THROAT: Lips, mucosa, and tongue normal; teeth and gums normal, mouth moist   NECK: Supple, symmetrical, trachea midline   BACK:   Symmetric, no curvature, ROM normal   LUNGS:   Clear to auscultation bilaterally, no rales, rhonchi, or wheezing, symmetric chest rise on inhalation, respirations unlabored, on 2L NC   CHEST WALL:  No tenderness or conspicuous deformity   HEART:  Regular rate and rhythm, S1 and S2 normal, no murmur, rub, or gallop, no conspicuous jugular venous distention noted, peripheral pulses intact    ABDOMEN:   Soft, non-tender, distended, ascitic, bowel sounds auscultated in all four quadrants, no rebound or guarding   EXTREMITIES: Extremities normal, atraumatic, no cyanosis or edema    SKIN: Dry to touch, no exanthems in the visualized areas   NEURO: Alert, oriented x3, moves all four extremities of their own volition    PSYCH: Cooperative and behavior is appropriate        Medical Decision Making       82 MINUTES SPENT BY ME on the date of service doing chart review, history, exam, documentation & further activities per the note.      Data   ------------------------- PAST 24 HR DATA REVIEWED -----------------------------------------------    I have personally reviewed the following data over the past 24 hrs:    7.9  \   7.8 (L)   / 222     N/A N/A N/A /  N/A   N/A N/A N/A \       Imaging  results reviewed over the past 24 hrs:   Recent Results (from the past 24 hour(s))   Echocardiogram Complete   Result Value    LVEF  60-65%    Narrative    [id number]  [id number]  [id number]  [id number]^JOSELUIS^JOSUE^[place] [address]     Name: HIRA JERONIMO  MRN: [id number]  : 1950  Study Date: 2023 11:08 AM  Age: 73 yrs  Gender: Male  Patient Location: [facility]  Reason For Study: Pulmonary Emboli  Ordering Physician: JOSUE HUGGINS  Performed By: CHANEL     BSA: 2.5 m2  Height: 73 in  Weight: 290 lb  HR: 80  BP: 157/74 mmHg  ______________________________________________________________________________  Procedure  Complete Portable Echo Adult. Definity (NDC #08492-577) given intravenously.  Technically difficult study.Extremely poor acoustic windows. Compared to prior  study, there is no significant change.  ______________________________________________________________________________  Interpretation Summary     Technically difficult study.Extremely poor acoustic windows. Most cardiac  structures are not well visualized.  Left ventricular function is normal.The ejection fraction is 60-65%.  The right ventricle is not well visualized. TAPSE is normal, which is  consistent with normal right ventricular systolic function.  Mild valvular aortic stenosis.  Compared to prior study, there is likely no significant change.     ______________________________________________________________________________  Left Ventricle  The left ventricle is normal in size. Left ventricular function is normal.The  ejection fraction is 60-65%. Left ventricular diastolic function is normal. No  regional wall motion abnormalities noted.     Right Ventricle  The right ventricle is not well visualized. TAPSE is normal, which is  consistent with normal right ventricular systolic function.     Atria  The left atrium is not well visualized. Right atrium not well visualized.     Mitral Valve  The  mitral valve is not well visualized. There is no mitral regurgitation  noted. There is no mitral valve stenosis.     Tricuspid Valve  The tricuspid valve is not well visualized. Right ventricular systolic  pressure could not be approximated due to inadequate tricuspid regurgitation.     Aortic Valve  The aortic valve is not well visualized. Mild valvular aortic stenosis.     Pulmonic Valve  The pulmonic valve is not well visualized.     Vessels  IVC diameter <2.1 cm collapsing >50% with sniff suggests a normal RA pressure  of 3 mmHg.     Pericardium  There is no pericardial effusion.     Rhythm  Sinus rhythm was noted.  ______________________________________________________________________________  MMode/2D Measurements & Calculations     TAPSE: 2.5 cm     Time Measurements  MM HR: 67.0 BPM     Doppler Measurements & Calculations  MV E max adolfo: 84.9 cm/sec  MV A max adolfo: 92.0 cm/sec  MV E/A: 0.92  MV dec slope: 339.0 cm/sec2  MV dec time: 0.25 sec  Ao V2 max: 263.0 cm/sec  Ao max P.0 mmHg  Ao V2 mean: 185.0 cm/sec  Ao mean P.0 mmHg  Ao V2 VTI: 62.1 cm  LV V1 max PG: 3.4 mmHg  LV V1 max: 92.7 cm/sec  LV V1 VTI: 22.3 cm  AV Adolfo Ratio (DI): 0.35  E/E' avg: 10.5  Lateral E/e': 10.0  Medial E/e': 11.0  RV S Adolfo: 10.7 cm/sec     ______________________________________________________________________________  Report approved by: Ryan Ruiz 2023 01:56 PM

## 2023-11-23 NOTE — PLAN OF CARE
Problem: Gas Exchange Impaired  Goal: Optimal Gas Exchange    Problem: VTE (Venous Thromboembolism)  Goal: Tissue Perfusion    Problem: Fall Injury Risk  Goal: Absence of Fall and Fall-Related Injury    Goal Outcome Evaluation:  VSS, afebrile. Remains on 2L via NC to keep sats in the low 90's. Required oxymask while sleeping. Tele SR w/BBB. RLE w/+2 edema, warmth and pain. PRN oxy w/min relief. Heparin infusing.

## 2023-11-23 NOTE — PROVIDER NOTIFICATION
11/23/23 0734   RCAT Assessment   Reason for Assessment   (Hypoxia)   Pulmonary Status 0   Surgical Status 0   Chest X-ray 0  (no recent cxr)   Respiratory Pattern 0   Mental Status 0   Breath Sounds 1   Cough Effectiveness 0   Level of Activity 0   O2 Required for SpO2>=92% 1  (will attempt to wean today)   Acuity Level (points) 2   Acuity Level  5   Aerosol Therapy (SVN)   Daily Review of Necessity (SVN) completed   Respiratory Treatment Status (SVN) given   Patient Position HOB elevated   Posttreatment Assessment (SVN) breath sounds unchanged     Pt states he does not use oxygen, nebs, inhalers, or CPAP at home. BS clear, diminished, strong non productive cough. This am Sp02 98% on 3L oxymask while asleep. Plan today to wean oxygen to RA as tolerated and encourage pt to get out of bed, deep breathing and cough. Will continue to monitor

## 2023-11-23 NOTE — CONSULTS
Care Management Initial Consult    General Information  Assessment completed with: Patient,    Type of CM/SW Visit: Initial Assessment    Primary Care Provider verified and updated as needed: Yes   Readmission within the last 30 days: previous discharge plan unsuccessful   Return Category: New Diagnosis  Reason for Consult: discharge planning  Advance Care Planning: Advance Care Planning Reviewed: no concerns identified          Communication Assessment  Patient's communication style: spoken language (English or Bilingual)    Hearing Difficulty or Deaf: no   Wear Glasses or Blind: yes    Cognitive  Cognitive/Neuro/Behavioral: WDL                      Living Environment:   People in home: alone     Current living Arrangements: apartment      Able to return to prior arrangements: yes  Living Arrangement Comments:  (Independent living [facility] in [place])    Family/Social Support:  Care provided by: self, child(leticia)  Provides care for: no one, unable/limited ability to care for self  Marital Status:   Children          Description of Support System: Supportive, Involved    Support Assessment: Adequate family and caregiver support, Adequate social supports    Current Resources:   Patient receiving home care services: No     Community Resources:    Equipment currently used at home: cane, straight  Supplies currently used at home:      Employment/Financial:  Employment Status: retired        Financial Concerns:             Does the patient's insurance plan have a 3 day qualifying hospital stay waiver?  Yes     Which insurance plan 3 day waiver is available? Alternative insurance waiver    Will the waiver be used for post-acute placement? No    Lifestyle & Psychosocial Needs:  Social Determinants of Health     Food Insecurity: Not on file   Depression: Not on file   Housing Stability: Not on file   Tobacco Use: Medium Risk (10/17/2023)    Patient History     Smoking Tobacco Use: Former     Smokeless  Tobacco Use: Unknown     Passive Exposure: Not on file   Financial Resource Strain: Not on file   Alcohol Use: Not on file   Transportation Needs: Not on file   Physical Activity: Not on file   Interpersonal Safety: Not on file   Stress: Not on file   Social Connections: Not on file       Functional Status:  Prior to admission patient needed assistance:   Dependent ADLs:: Independent  Dependent IADLs:: Independent       Mental Health Status:  Mental Health Status: No Current Concerns       Chemical Dependency Status:  Chemical Dependency Status: No Current Concerns             Values/Beliefs:  Spiritual, Cultural Beliefs, Cheondoism Practices, Values that affect care: no               Additional Information:  Writer met with patient who reports had bowel resection for colon CA 11/3, patient was to discharge with Lovenox but patient reports some type of miscommunication and patient never received Lovenox.  Patient returned with R leg DVT and PE-currently on heparin drip and reports colon cancer has returned-was to start PO chemo meds but did not receive meds prior to this hospital admission.    Adult son and daughter provide assistance with food/errands. Plans to return to independent living [facility].        Brit Scott CM

## 2023-11-23 NOTE — PROGRESS NOTES
"[facility]    Medicine Progress Note - Hospitalist Service    Date of Admission:  11/21/2023    Assessment & Plan    Mesfin Lee is a 73 year old male admitted on 11/21/2023. He has a pmhx of mdd, insomnia, htn, prior clots, noted w/ RBBB, cirrhotic liver, s/p right colectomy for colon cancer earlier this year in October; who is admitted 11/21/2023 on a heparin drip after being found with Nonocclusive to occlusive DVT extending from the mid right femoral vein into the popliteal and calf veins and also Acute bilateral pulmonary emboli with secondary signs of right heart strain. On admission pt was hypoxic but stable on 2L of 02 via NC, also noted with frequent PVCs but EKG appears stable. Notably, received lovenox x28 days which completed this past weekend.     Echo TAPSE is normal, which is consistent with normal right ventricular systolic function. With ongoing hypoxia, adding duonebs for symptoms support. Given a potential question of anticoagulation medication prophylaxis failure, will consult Hematology/Oncology to review case. Pt remains on heparin and hypoxic. Checking BNP; has a documentation of both \"copd\" and \"no copd\" but will trial prednisone 40mg po x1. Pt confirms he never started lovenox prophylaxis (despite outpatient follow ups), as he was \"waiting for pharmacy to call.\" Appreciate Heme/onc seeing for further guidance.    ----  Bilateral PE and dvt in right femoral; acute respiratory failure w/ hypoxia 2/2 PE (hx of documented copd and also no-copd)  Smoking hx of 25 yrs  Right heart strain, known rbbb on EKG; frequent pvcs  s/p right colectomy for colon cancer in October, discharged w/ lovenox   Anemia  A- on heparin drip. See direclty above, asking heme/onc to see. See above in bold- he actually did NOT take the instructed prophylactic lovenox. Will check bnp and trial empiric prednisone ofr his hypoxia. Question if he has underlying lung dx but would need PFTs as an " "outpt.  P:  -continue heparin w/ labs to adjust rate  - monitor clinically on heparin, eventually transition to po regimen   - however, given potential question of anticoagulation medication prophylaxis failure, will ask specialist to see  - Consult to heme/onc 11/22/2023   - echocardiogram, increased sensitivity for right heart strain and follow up on strain   - it showed right ventricle is not well visualized. However, TAPSE is normal, which is consistent with normal right ventricular systolic function  - prn oxycodone and tylenol  - start duonebs QID w/ RT to adjust   - hgb downtrending so monitor for signs/symptoms of bleed while on blood thinner   - transfuse if < 7    Mdd  Insomnia  A/p- stable, cont lexapro and trazodone, added prn melatonin    Htn  A/p- stable, appropriate for age range; would consider hydralazine if elevated and symptomatic (ie headaches or vision changes)    cirrhotic liver  A/p- noted, follow clinically         Diet: Regular Diet Adult    DVT Prophylaxis: Pneumatic Compression Devices, Ambulate every shift, and he is on heparin drip  Rachel Catheter: Not present  Lines: None     Cardiac Monitoring: ACTIVE order. Indication: Tachyarrhythmias, acute (48 hours)  Code Status: Full Code      Clinically Significant Risk Factors                # Coagulation Defect: INR = 1.17 (Ref range: 0.85 - 1.15) and/or PTT = 26 Seconds (Ref range: 22 - 38 Seconds), will monitor for bleeding    # Hypertension: Noted on problem list        # Obesity: Estimated body mass index is 35.41 kg/m  as calculated from the following:    Height as of this encounter: 1.981 m (6' 6\").    Weight as of this encounter: 139 kg (306 lb 6.4 oz)., PRESENT ON ADMISSION            Disposition Plan      Expected Discharge Date: 11/27/2023        Discharge Comments: extensive DVT & PE. on heparin gtt hem/onc to see re: coagulopathy            Haider Davis MD  Hospitalist Service  [facility]  Securely " "message with Johnson (more info)  Text page via [employer] Paging/Directory   ______________________________________________________________________    Interval History   - no acute events ov  - still on 2L and short of breath  - pain meds helped with pain spikes  - reveals  he never started lovenox prophylaxis (despite outpatient follow ups), as he was \"waiting for pharmacy to call.\"  - discussed again w/ bedside RN     Physical Exam   Vital Signs: Temp: 98  F (36.7  C) Temp src: Oral BP: 130/73 Pulse: 79   Resp: 19 SpO2: 98 % O2 Device: Oxymask Oxygen Delivery: 1 LPM  Weight: 306 lbs 6.4 oz    General: alert, oriented, and in no acute distress  Pulmonary: coarse breath sounds, decreased at bases, no rales or wheezes; on 2L but increased work of breathing speaking  CVS: regular rate and rhythm though tele shows PVCs, no murmurs, rubs, or gallops; no blatant jugular venous distention; no extremity edema and extremities are warm to the touch  GI: soft, nontender, BS+, no rebound or guarding, no conspicuous organomegaly   Neuro: nonfocal, moves all extremities of own volition  Psych: appropriate     Medical Decision Making       50 MINUTES SPENT BY ME on the date of service doing chart review, history, exam, documentation & further activities per the note.      Data   ------------------------- PAST 24 HR DATA REVIEWED -----------------------------------------------    I have personally reviewed the following data over the past 24 hrs:    7.9  \   7.8 (L)   / 222     N/A N/A N/A /  N/A   N/A N/A N/A \       Imaging results reviewed over the past 24 hrs:   Recent Results (from the past 24 hour(s))   Echocardiogram Complete   Result Value    LVEF  60-65%    Narrative    [id number]  [id number]  [id number]  [id number]^JOSELUIS^JOSUE^[place] [address]     Name: HIRA JERONIMO  MRN: [id number]  : 1950  Study Date: 2023 11:08 AM  Age: 73 yrs  Gender: Male  Patient Location: " [facility]  Reason For Study: Pulmonary Emboli  Ordering Physician: JOSUE HUGGINS  Performed By: CHANEL     BSA: 2.5 m2  Height: 73 in  Weight: 290 lb  HR: 80  BP: 157/74 mmHg  ______________________________________________________________________________  Procedure  Complete Portable Echo Adult. Definity (NDC #04510-735) given intravenously.  Technically difficult study.Extremely poor acoustic windows. Compared to prior  study, there is no significant change.  ______________________________________________________________________________  Interpretation Summary     Technically difficult study.Extremely poor acoustic windows. Most cardiac  structures are not well visualized.  Left ventricular function is normal.The ejection fraction is 60-65%.  The right ventricle is not well visualized. TAPSE is normal, which is  consistent with normal right ventricular systolic function.  Mild valvular aortic stenosis.  Compared to prior study, there is likely no significant change.     ______________________________________________________________________________  Left Ventricle  The left ventricle is normal in size. Left ventricular function is normal.The  ejection fraction is 60-65%. Left ventricular diastolic function is normal. No  regional wall motion abnormalities noted.     Right Ventricle  The right ventricle is not well visualized. TAPSE is normal, which is  consistent with normal right ventricular systolic function.     Atria  The left atrium is not well visualized. Right atrium not well visualized.     Mitral Valve  The mitral valve is not well visualized. There is no mitral regurgitation  noted. There is no mitral valve stenosis.     Tricuspid Valve  The tricuspid valve is not well visualized. Right ventricular systolic  pressure could not be approximated due to inadequate tricuspid regurgitation.     Aortic Valve  The aortic valve is not well visualized. Mild valvular aortic stenosis.     Pulmonic Valve  The pulmonic valve  is not well visualized.     Vessels  IVC diameter <2.1 cm collapsing >50% with sniff suggests a normal RA pressure  of 3 mmHg.     Pericardium  There is no pericardial effusion.     Rhythm  Sinus rhythm was noted.  ______________________________________________________________________________  MMode/2D Measurements & Calculations     TAPSE: 2.5 cm     Time Measurements  MM HR: 67.0 BPM     Doppler Measurements & Calculations  MV E max adolfo: 84.9 cm/sec  MV A max adolfo: 92.0 cm/sec  MV E/A: 0.92  MV dec slope: 339.0 cm/sec2  MV dec time: 0.25 sec  Ao V2 max: 263.0 cm/sec  Ao max P.0 mmHg  Ao V2 mean: 185.0 cm/sec  Ao mean P.0 mmHg  Ao V2 VTI: 62.1 cm  LV V1 max PG: 3.4 mmHg  LV V1 max: 92.7 cm/sec  LV V1 VTI: 22.3 cm  AV Adolfo Ratio (DI): 0.35  E/E' avg: 10.5  Lateral E/e': 10.0  Medial E/e': 11.0  RV S Adolfo: 10.7 cm/sec     ______________________________________________________________________________  Report approved by: Ryan Ruiz 2023 01:56 PM

## 2023-11-24 LAB
ERYTHROCYTE [DISTWIDTH] IN BLOOD BY AUTOMATED COUNT: 20.5 % (ref 10–15)
HCT VFR BLD AUTO: 28.3 % (ref 40–53)
HGB BLD-MCNC: 8.2 G/DL (ref 13.3–17.7)
MCH RBC QN AUTO: 20.4 PG (ref 26.5–33)
MCHC RBC AUTO-ENTMCNC: 29 G/DL (ref 31.5–36.5)
MCV RBC AUTO: 71 FL (ref 78–100)
PLATELET # BLD AUTO: 228 10E3/UL (ref 150–450)
RBC # BLD AUTO: 4.01 10E6/UL (ref 4.4–5.9)
UFH PPP CHRO-ACNC: 0.43 IU/ML
WBC # BLD AUTO: 9.2 10E3/UL (ref 4–11)

## 2023-11-24 PROCEDURE — 250N000011 HC RX IP 250 OP 636: Mod: JZ | Performed by: EMERGENCY MEDICINE

## 2023-11-24 PROCEDURE — 999N000157 HC STATISTIC RCP TIME EA 10 MIN

## 2023-11-24 PROCEDURE — 85014 HEMATOCRIT: CPT | Performed by: EMERGENCY MEDICINE

## 2023-11-24 PROCEDURE — 250N000013 HC RX MED GY IP 250 OP 250 PS 637: Performed by: STUDENT IN AN ORGANIZED HEALTH CARE EDUCATION/TRAINING PROGRAM

## 2023-11-24 PROCEDURE — 94640 AIRWAY INHALATION TREATMENT: CPT

## 2023-11-24 PROCEDURE — 250N000009 HC RX 250: Performed by: STUDENT IN AN ORGANIZED HEALTH CARE EDUCATION/TRAINING PROGRAM

## 2023-11-24 PROCEDURE — 99233 SBSQ HOSP IP/OBS HIGH 50: CPT | Performed by: STUDENT IN AN ORGANIZED HEALTH CARE EDUCATION/TRAINING PROGRAM

## 2023-11-24 PROCEDURE — 250N000012 HC RX MED GY IP 250 OP 636 PS 637: Performed by: STUDENT IN AN ORGANIZED HEALTH CARE EDUCATION/TRAINING PROGRAM

## 2023-11-24 PROCEDURE — 94640 AIRWAY INHALATION TREATMENT: CPT | Mod: 76

## 2023-11-24 PROCEDURE — 85520 HEPARIN ASSAY: CPT | Performed by: HOSPITALIST

## 2023-11-24 PROCEDURE — 36415 COLL VENOUS BLD VENIPUNCTURE: CPT | Performed by: HOSPITALIST

## 2023-11-24 PROCEDURE — 210N000002 HC R&B HEART CARE

## 2023-11-24 RX ORDER — PREDNISONE 20 MG/1
40 TABLET ORAL DAILY
Status: COMPLETED | OUTPATIENT
Start: 2023-11-24 | End: 2023-11-27

## 2023-11-24 RX ADMIN — IPRATROPIUM BROMIDE AND ALBUTEROL SULFATE 3 ML: .5; 3 SOLUTION RESPIRATORY (INHALATION) at 08:26

## 2023-11-24 RX ADMIN — HEPARIN SODIUM 2250 UNITS/HR: 10000 INJECTION, SOLUTION INTRAVENOUS at 22:35

## 2023-11-24 RX ADMIN — OXYCODONE HYDROCHLORIDE 5 MG: 5 TABLET ORAL at 21:16

## 2023-11-24 RX ADMIN — OXYCODONE HYDROCHLORIDE 5 MG: 5 TABLET ORAL at 15:48

## 2023-11-24 RX ADMIN — IPRATROPIUM BROMIDE AND ALBUTEROL SULFATE 3 ML: .5; 3 SOLUTION RESPIRATORY (INHALATION) at 19:14

## 2023-11-24 RX ADMIN — HEPARIN SODIUM 2250 UNITS/HR: 10000 INJECTION, SOLUTION INTRAVENOUS at 00:15

## 2023-11-24 RX ADMIN — HEPARIN SODIUM 2250 UNITS/HR: 10000 INJECTION, SOLUTION INTRAVENOUS at 12:11

## 2023-11-24 RX ADMIN — PREDNISONE 40 MG: 20 TABLET ORAL at 11:46

## 2023-11-24 RX ADMIN — ESCITALOPRAM OXALATE 10 MG: 10 TABLET ORAL at 20:44

## 2023-11-24 RX ADMIN — TRAZODONE HYDROCHLORIDE 100 MG: 100 TABLET ORAL at 21:17

## 2023-11-24 ASSESSMENT — ACTIVITIES OF DAILY LIVING (ADL)
ADLS_ACUITY_SCORE: 24

## 2023-11-24 NOTE — PLAN OF CARE
Goal Outcome Evaluation:    Patient VSS, RA, A&O. Reporting moderate pain, managed with PRN oxy, rested with eyes closed. Heparin running. SR w/1st degree AV block and BBB on tele. No significant occurrences overnight.     Problem: Gas Exchange Impaired  Goal: Optimal Gas Exchange  Outcome: Progressing  Intervention: Optimize Oxygenation and Ventilation  Recent Flowsheet Documentation  Taken 11/24/2023 0400 by Abhijeet Ramos, RN  Head of Bed (HOB) Positioning: HOB at 20-30 degrees  Taken 11/23/2023 2345 by Abhijeet Ramos, RN  Head of Bed (HOB) Positioning: HOB at 20-30 degrees     Problem: VTE (Venous Thromboembolism)  Goal: Tissue Perfusion  Outcome: Progressing

## 2023-11-24 NOTE — PROGRESS NOTES
"CLINICAL NUTRITION SERVICES - ASSESSMENT NOTE     Nutrition Prescription    RECOMMENDATIONS FOR MDs/PROVIDERS TO ORDER:  None    Malnutrition Status:    Patient does not meet two of the established criteria necessary for diagnosing malnutrition    Recommendations already ordered by Registered Dietitian (RD):  - Nutrition education for nutrition relationship to health/disease - importance of protein for increasing strength/mobility  - Medical food supplement therapy - Ensure BID    Future/Additional Recommendations:  Monitor po intake, ONS tolerance/intake     REASON FOR ASSESSMENT  Mesfin Lee is a/an 73 year old male assessed by the dietitian for Admission Nutrition Risk Screen for positive: unsure wt loss    NUTRITION HISTORY  Dx: Nonocclusive to occlusive DVT extending from the mid right femoral vein into the popliteal and calf veins and also Acute bilateral pulmonary emboli with secondary signs of right heart strain.   PMH: mdd, insomnia, htn, prior clots, noted w/ RBBB, cirrhotic liver, s/p right colectomy for colon cancer earlier this year in October     Met with pt in room this afternoon. Pt reports his recent wt loss (22 lbs) was intentional, as he's trying to increase his mobility and doesn't want to end up \"dependent on a walker\". Writer reminded pt to prioritize meeting his protein needs, and prioritizing protein with meals, as he should maintain/increase his muscle mass to help with mobility. Pt plans to purchase protein supplements to help with this. Pt was agreeable to receiving Ensure BID, as these will help improve his intake, and so he can get used to taking them.    CURRENT NUTRITION ORDERS  Diet: Regular  Intake/Tolerance: Pt ordering 2-3 meals/day and consuming 100% of them per flow sheets and [employer]    LABS  Labs reviewed    MEDICATIONS  Medications reviewed  Prednisone  Senna    GI:   LBM on 11/23 x2    ANTHROPOMETRICS  Height: 198.1 cm (6' 6\")  Most Recent Weight: 139 kg (306 lb 6.4 " oz)    IBW: 96.8 kg   BMI: Obesity Grade II BMI 35-39.9  Weight History:   11/23/23 : 139 kg (306 lb 6.4 oz)   10/11/23 : 146.8 kg (323 lb 11.2 oz)  09/22/23 : 149.1 kg (328 lb 12.8 oz)   01/18/22 : 141.3 kg (311 lb 6.4 oz)   10/26/17 : 140.8 kg (310 lb 4.8 oz)   5% wt loss in 1.5 months    Dosing Weight: 107 kg    ASSESSED NUTRITION NEEDS  Estimated Energy Needs: 6944-8394 kcals/day (25 - 30 kcals/kg)  Justification: Maintenance  Estimated Protein Needs: 107-128 grams protein/day (1 - 1.2 grams of pro/kg)  Justification: Maintenance, cirrhosis  Estimated Fluid Needs: 1587-0577 mL/day (1 mL/kcal)   Justification: Maintenance    PHYSICAL FINDINGS  See malnutrition section below.    MALNUTRITION  % Intake: Decreased intake does not meet criteria  % Weight Loss: 5% in 1 month (moderate)  Subcutaneous Fat Loss: None observed  Muscle Loss: None observed  Fluid Accumulation/Edema: None noted  Malnutrition Diagnosis: Patient does not meet two of the established criteria necessary for diagnosing malnutrition    NUTRITION DIAGNOSIS  No nutrition diagnosis at this time    INTERVENTIONS  Implementation  - Nutrition education for nutrition relationship to health/disease - importance of protein for increasing strength/mobility  - Medical food supplement therapy - Ensure BID    Goals  Patient to consume % of nutritionally adequate meal trays TID, or the equivalent with supplements/snacks.     Monitoring/Evaluation  Progress toward goals will be monitored and evaluated per protocol.

## 2023-11-24 NOTE — PLAN OF CARE
[id number]    VSS on 1L O2. A&O. SR w/ 1st degree AVB & BBB on tele. Pt reports RLE pain well managed w/ use of PRN Oxy. Heparin gtt @2550 unit(s)/hr in left PIV. Pt c/o constipation, PRN 2 tablets senna given. Visited by family. Care plan discussed. Pt appears to be resting comfortably in bed.

## 2023-11-24 NOTE — PROGRESS NOTES
"[facility]    Medicine Progress Note - Hospitalist Service    Date of Admission:  11/21/2023    Assessment & Plan    Mesfin Lee is a 73 year old male admitted on 11/21/2023. He has a pmhx of mdd, insomnia, htn, prior clots, noted w/ RBBB, cirrhotic liver, s/p right colectomy for colon cancer earlier this year in October; who is admitted 11/21/2023 on a heparin drip after being found with Nonocclusive to occlusive DVT extending from the mid right femoral vein into the popliteal and calf veins and also Acute bilateral pulmonary emboli with secondary signs of right heart strain. On admission pt was hypoxic but stable on 2L of 02 via NC, also noted with frequent PVCs but EKG appears stable. Notably, received lovenox x28 days which completed this past weekend.     Echo TAPSE is normal, which is consistent with normal right ventricular systolic function. With ongoing hypoxia, adding duonebs for symptoms support. Given a potential question of anticoagulation medication prophylaxis failure, will consult Hematology/Oncology to review case. Pt remains on heparin and hypoxic. Checking BNP; has a documentation of both \"copd\" and \"no copd\" but will trial prednisone 40mg po x1. Pt confirms he never started lovenox prophylaxis (despite outpatient follow ups), as he was \"waiting for pharmacy to call.\"     Heme/onc recommended 48-72 more hours on heparin drip before transitioning to Eliquis or Xarelto. Plan to transition to PO Sunday likely discharge Monday. Pt is aware on in agreement with plans. Hgb stable.     ----  Bilateral PE and dvt in right femoral; acute respiratory failure w/ hypoxia 2/2 PE (hx of documented copd and also no-copd)  Smoking hx of 25 yrs  Right heart strain, known rbbb on EKG; frequent pvcs  s/p right colectomy for colon cancer in October, discharged w/ lovenox   Anemia  A- on heparin drip. See direclty above, asking heme/onc to see. See above in bold- he actually did NOT take " "the instructed prophylactic lovenox. Likely has underlying lung dx but would need PFTs as an outpt to confirm.   P:  -continue heparin w/ labs to adjust rate  - monitor clinically on heparin, eventually transition to po regimen - this SUNDAY  - Consult to heme/onc greatly appreciated  - echocardiogram obtained as it has higher sensitivity for right heart strain and follow up on strain   - it showed right ventricle is not well visualized. However, TAPSE is normal, which is consistent with normal right ventricular systolic function  - prn oxycodone and tylenol  - duonebs QID w/ RT adjusts  - hgb downtrending so monitor for signs/symptoms of bleed while on blood thinner   - transfuse if < 7   - continue prednisone 40mg daily through 11/27    Mdd  Insomnia  A/p- stable, cont lexapro and trazodone, added prn melatonin    Htn  A/p- stable, appropriate for age range; would consider hydralazine if elevated and symptomatic (ie headaches or vision changes)    cirrhotic liver  A/p- noted, follow clinically         Diet: Regular Diet Adult    DVT Prophylaxis: Pneumatic Compression Devices, Ambulate every shift, and he is on heparin drip  Rachel Catheter: Not present  Lines: None     Cardiac Monitoring: ACTIVE order. Indication: Tachyarrhythmias, acute (48 hours)  Code Status: Full Code      Clinically Significant Risk Factors                   # Hypertension: Noted on problem list        # Obesity: Estimated body mass index is 35.41 kg/m  as calculated from the following:    Height as of this encounter: 1.981 m (6' 6\").    Weight as of this encounter: 139 kg (306 lb 6.4 oz)., PRESENT ON ADMISSION            Disposition Plan     Expected Discharge Date: 11/27/2023      Destination: home  Discharge Comments: extensive DVT & PE. on heparin gtt hem/onc to see re: coagulopathy            Haider Davis MD  Hospitalist Service  [facility]  Securely message with [employer] (more info)  Text page via [employer] " "[employer]/Directory   ______________________________________________________________________    Interval History   - no acute events ov  - still on 2L and short of breath but prednisone possibly helped and discussed cotinuation  - discussed heparin and transition on Sunday and he agrees; we discussed following hgb   - pain stable   - reveals  he never started lovenox prophylaxis (despite outpatient follow ups), as he was \"waiting for pharmacy to call.\"  - discussed again w/ bedside RN     Physical Exam   Vital Signs: Temp: 97.8  F (36.6  C) Temp src: Oral BP: (!) 160/84 Pulse: 57   Resp: 16 SpO2: 99 % O2 Device: Nasal cannula Oxygen Delivery: 2 LPM  Weight: 306 lbs 6.4 oz    General: alert, oriented, and in no acute distress  Pulmonary: coarse breath sounds, no rales or wheezes; remains on 2L but speaking with evidence of accessory muscle use   CVS: regular rate and rhythm though tele shows PVCs, no murmurs, rubs, or gallops; no blatant jugular venous distention; no extremity edema and extremities are warm to the touch  GI: soft, nontender, BS+, no rebound or guarding, no conspicuous organomegaly   Neuro: nonfocal, moves all extremities of own volition  Psych: appropriate     Medical Decision Making       48 MINUTES SPENT BY ME on the date of service doing chart review, history, exam, documentation & further activities per the note.      Data   ------------------------- PAST 24 HR DATA REVIEWED -----------------------------------------------    I have personally reviewed the following data over the past 24 hrs:    9.2  \   8.2 (L)   / 228     N/A N/A N/A /  N/A   N/A N/A N/A \     Trop: N/A BNP: N/A       Imaging results reviewed over the past 24 hrs:   No results found for this or any previous visit (from the past 24 hour(s)).    "

## 2023-11-24 NOTE — PROGRESS NOTES
Care Management Follow Up    Length of Stay (days): 3    Expected Discharge Date: 11/27/2023     Concerns to be Addressed: discharge planning       Patient plan of care discussed at interdisciplinary rounds: Yes    Anticipated Discharge Disposition: Home     Anticipated Discharge Services: None    Anticipated Discharge DME: None    Education Provided on the Discharge Plan: Yes (AVS per bedside RN)    Patient/Family in Agreement with the Plan: yes        Additional Information:  CM reviewed chart. No CM needs identified or requested. Anticipate family will provide transportation home at discharge.     Katrina Benson RN

## 2023-11-24 NOTE — PROGRESS NOTES
Pt last seen on 2L NC sating high 90's. Scheduled neb treatment given. BS clear;diminished. RT will continue to follow.

## 2023-11-24 NOTE — PROGRESS NOTES
Pt received neb treatment and tolerated well. Pt on 1L NC with sat in the mid 90'.     Pallavi Cortez, RT

## 2023-11-24 NOTE — PROGRESS NOTES
Oncology Progress Note:    DOS:  2023    Primary Oncologist:  Donis Tafoya MD  [facility] [place], [phone]    Assessment/Plan:    1)  DVT/PE:  Continue IV heparin until  morning and covert to Eliquis or Xarelto.  Continue hospitalization for observation and plan on discharge on Monday morning.  Symptoms are slowly improving.  Started on Prednisone today.    2)  Stage IIB colon cancer:  Patient was supposed to start capecitabine 2000 mg twice daily this week.  Did not receive pills as scheduled on 2023 and then was admitted to [facility] on 2023.  Capecitabine has been delivered to his senior residence and are in his home. Start of treatment is on hold until he can be evaluated in our offices following hospitalization.  Currently, he has an appointment scheduled with Dr. Tafoya on .  Based on his hospitalization, we will determine if he should come in next week or if he is stable enough to wait until .  Will have Dr. Tafoya's nurse reach out to him to confirm the plan for follow up.    3.  Anemia:  Transfuse if Hgb <7.0 or if patient is symptomatic.      Subjective:  Patient resting in bed with O2 per nasal cannula at 1 liter.  Talkative.  Notes that he had blood clots following his first colon surgery 5 years ago.      Objective:  Afebrile, VSS, O2 at 1 L per nasal cannula.  General:  Alert, oriented.  No respiratory distress at rest.     Labs:  Hgb = 8.2, WBC = 8.2, platelets = 228,000    Imagin2023:  CT of chest PE Protocol done 2023 shows acute bilateral pulmonary emboli with secondary signs of right heart strain.   2023:  US of right leg shows nonocclusive to occlusive DVT extending from the mid right femoral vein into the popliteal and calf veins.     Please contact the on call oncologist over the weekend with any questions or concerns at [phone]..      Rosa Cochran, RN, MS, CNP  [facility]  Office  [phone]  Cell:  [phone] (Tuesday - Friday, 8:30 am to 5 pm)

## 2023-11-25 LAB
HGB BLD-MCNC: 7.7 G/DL (ref 13.3–17.7)
UFH PPP CHRO-ACNC: 0.42 IU/ML

## 2023-11-25 PROCEDURE — 250N000013 HC RX MED GY IP 250 OP 250 PS 637: Performed by: STUDENT IN AN ORGANIZED HEALTH CARE EDUCATION/TRAINING PROGRAM

## 2023-11-25 PROCEDURE — 999N000157 HC STATISTIC RCP TIME EA 10 MIN

## 2023-11-25 PROCEDURE — 85520 HEPARIN ASSAY: CPT | Performed by: STUDENT IN AN ORGANIZED HEALTH CARE EDUCATION/TRAINING PROGRAM

## 2023-11-25 PROCEDURE — 250N000011 HC RX IP 250 OP 636: Mod: JZ | Performed by: EMERGENCY MEDICINE

## 2023-11-25 PROCEDURE — 94640 AIRWAY INHALATION TREATMENT: CPT | Mod: 76

## 2023-11-25 PROCEDURE — 250N000012 HC RX MED GY IP 250 OP 636 PS 637: Performed by: STUDENT IN AN ORGANIZED HEALTH CARE EDUCATION/TRAINING PROGRAM

## 2023-11-25 PROCEDURE — 99233 SBSQ HOSP IP/OBS HIGH 50: CPT | Performed by: STUDENT IN AN ORGANIZED HEALTH CARE EDUCATION/TRAINING PROGRAM

## 2023-11-25 PROCEDURE — 210N000002 HC R&B HEART CARE

## 2023-11-25 PROCEDURE — 36415 COLL VENOUS BLD VENIPUNCTURE: CPT | Performed by: STUDENT IN AN ORGANIZED HEALTH CARE EDUCATION/TRAINING PROGRAM

## 2023-11-25 PROCEDURE — 94640 AIRWAY INHALATION TREATMENT: CPT

## 2023-11-25 PROCEDURE — 85018 HEMOGLOBIN: CPT | Performed by: STUDENT IN AN ORGANIZED HEALTH CARE EDUCATION/TRAINING PROGRAM

## 2023-11-25 PROCEDURE — 250N000009 HC RX 250: Performed by: STUDENT IN AN ORGANIZED HEALTH CARE EDUCATION/TRAINING PROGRAM

## 2023-11-25 RX ADMIN — ESCITALOPRAM OXALATE 10 MG: 10 TABLET ORAL at 20:20

## 2023-11-25 RX ADMIN — HEPARIN SODIUM 2250 UNITS/HR: 10000 INJECTION, SOLUTION INTRAVENOUS at 10:03

## 2023-11-25 RX ADMIN — OXYCODONE HYDROCHLORIDE 5 MG: 5 TABLET ORAL at 23:46

## 2023-11-25 RX ADMIN — IPRATROPIUM BROMIDE AND ALBUTEROL SULFATE 3 ML: .5; 3 SOLUTION RESPIRATORY (INHALATION) at 07:33

## 2023-11-25 RX ADMIN — TRAZODONE HYDROCHLORIDE 100 MG: 100 TABLET ORAL at 21:31

## 2023-11-25 RX ADMIN — HEPARIN SODIUM 2250 UNITS/HR: 10000 INJECTION, SOLUTION INTRAVENOUS at 20:20

## 2023-11-25 RX ADMIN — IPRATROPIUM BROMIDE AND ALBUTEROL SULFATE 3 ML: .5; 3 SOLUTION RESPIRATORY (INHALATION) at 19:12

## 2023-11-25 RX ADMIN — PREDNISONE 40 MG: 20 TABLET ORAL at 08:13

## 2023-11-25 ASSESSMENT — ACTIVITIES OF DAILY LIVING (ADL)
ADLS_ACUITY_SCORE: 24

## 2023-11-25 NOTE — PROGRESS NOTES
RT Note:    Scheduled BID DuoNeb given per order. Pt on 2L with clear;diminished breath sounds. Will continue to monitor and assess pt.

## 2023-11-25 NOTE — PROGRESS NOTES
"[facility]    Medicine Progress Note - Hospitalist Service    Date of Admission:  11/21/2023    Assessment & Plan   Mesfin Lee is a 73 year old male admitted on 11/21/2023. He has a pmhx of mdd, insomnia, htn, prior clots, noted w/ RBBB, cirrhotic liver, s/p right colectomy for colon cancer earlier this year in October; who is admitted 11/21/2023 on a heparin drip after being found with Nonocclusive to occlusive DVT extending from the mid right femoral vein into the popliteal and calf veins and also Acute bilateral pulmonary emboli with secondary signs of right heart strain. On admission pt was hypoxic but stable on 2L of 02 via NC, also noted with frequent PVCs but EKG appears stable. Notably, received lovenox x28 days after the colectomy, but pt did not ever start as he was 'waiting for pharmacy to call.'    Echo TAPSE was normal, which is consistent with normal right ventricular systolic function. With ongoing hypoxia, adding duonebs for symptoms support. Consulted Hematology/Oncology. Pt remains on heparin and hypoxia improving. Checked BNP; has a documentation of both \"copd\" and \"no copd\" but trial prednisone 40mg clinically helping, now on 1L NC.    Heme/onc recommended 48-72 more hours on heparin drip on their consult, before transitioning to Eliquis or Xarelto. Plan to transition to PO on 11/26 and hopefully discharge Monday. Hgb a bit labile so monitor closely -- now 7.7.      ----  Bilateral PE and dvt in right femoral; acute respiratory failure w/ hypoxia 2/2 PE (hx of documented copd and also no-copd)  Smoking hx of 25 yrs  Right heart strain, known rbbb on EKG; frequent pvcs  s/p right colectomy for colon cancer in October, discharged w/ lovenox   Anemia  A- on heparin drip. See direclty above, asking heme/onc to see. See above in bold- he actually did NOT take the instructed prophylactic lovenox. Likely has underlying lung dx but would need PFTs as an outpt to confirm. " "  P:  -continue heparin w/ labs to adjust rate  - monitor clinically on heparin, eventually transition to po regimen - on 11/26  - Consult to heme/onc greatly appreciated  - echocardiogram obtained as it has higher sensitivity for right heart strain and follow up on strain   - it showed right ventricle is not well visualized. However, TAPSE is normal, which is consistent with normal right ventricular systolic function  - prn oxycodone and tylenol  - duonebs QID w/ RT adjusts  - hgb downtrending so monitor for signs/symptoms of bleed while on blood thinner   - transfuse if < 7   - continue prednisone 40mg daily through 11/27    Mdd  Insomnia  A/p- stable, cont lexapro and trazodone, added prn melatonin    Htn  A/p- stable, appropriate for age range; would consider hydralazine if elevated and symptomatic (ie headaches or vision changes)    cirrhotic liver  A/p- noted, follow clinically         Diet: Regular Diet Adult  Snacks/Supplements Adult: Ensure Enlive; Between Meals    DVT Prophylaxis: Pneumatic Compression Devices, Ambulate every shift, and he is on heparin drip  Rachel Catheter: Not present  Lines: None     Cardiac Monitoring: ACTIVE order. Indication: Tachyarrhythmias, acute (48 hours)  Code Status: Full Code      Clinically Significant Risk Factors                   # Hypertension: Noted on problem list        # Obesity: Estimated body mass index is 35.59 kg/m  as calculated from the following:    Height as of this encounter: 1.981 m (6' 6\").    Weight as of this encounter: 139.7 kg (308 lb)., PRESENT ON ADMISSION            Disposition Plan      Expected Discharge Date: 11/27/2023      Destination: home  Discharge Comments: extensive DVT & PE. on heparin gtt hem/onc to see re: coagulopathy            Haider Davis MD  Hospitalist Service  [facility]  Securely message with [employer] (more info)  Text page via [employer] Paging/Directory "   ______________________________________________________________________    Interval History   - no acute events ov  - now on 1L NC, some relative improvement  - discussed heparin and transition tomorrow and he agrees; we discussed following hgb - now 7.7  - pain stable      Physical Exam   Vital Signs: Temp: 98  F (36.7  C) Temp src: Oral BP: (!) 148/81 Pulse: 72   Resp: 20 SpO2: 94 % O2 Device: Nasal cannula Oxygen Delivery: 2 LPM  Weight: 308 lbs 0 oz    General: alert, oriented, and in no acute distress  Pulmonary: coarse breath sounds, no rales or wheezes; remains on 1L but speaking with evidence of accessory muscle use   CVS: regular rate and rhythm though tele shows PVCs, no murmurs, rubs, or gallops; no blatant jugular venous distention; no extremity edema and extremities are warm to the touch  GI: soft, nontender, BS+, no rebound or guarding, no conspicuous organomegaly   Neuro: nonfocal, moves all extremities of own volition  Psych: appropriate     Medical Decision Making       42 MINUTES SPENT BY ME on the date of service doing chart review, history, exam, documentation & further activities per the note.      Data   ------------------------- PAST 24 HR DATA REVIEWED -----------------------------------------------    I have personally reviewed the following data over the past 24 hrs:    N/A  \   7.7 (L)   / N/A     N/A N/A N/A /  N/A   N/A N/A N/A \       Imaging results reviewed over the past 24 hrs:   No results found for this or any previous visit (from the past 24 hour(s)).

## 2023-11-25 NOTE — PLAN OF CARE
Problem: Gas Exchange Impaired  Goal: Optimal Gas Exchange  Intervention: Optimize Oxygenation and Ventilation  Recent Flowsheet Documentation  Taken 11/24/2023 0900 by Pj Ruffin  Head of Bed (HOB) Positioning: HOB at 20-30 degrees   Goal Outcome Evaluation: O2 sat    Patient A&O x4, VSS, EKG showing first degree heart block. Pain in right leg from DVT that is well managed with PRN oxycodone twice a day. O2 sats stayed in the mid- to high-90s all day on 1L oxygen. Plan is to keep on heparin drip until Sunday before transitioning to oral blood thinner and potential discharge on Monday.

## 2023-11-25 NOTE — PROGRESS NOTES
Patient received neb treatment per MD order. Pt educated with the explanation of the medication they received. Patient showed understanding with verbalization.  BS: clear and diminished.  No change post treatment.  Pt on 1L.     Liana Reddy, RT on 11/25/2023 at 7:35 AM

## 2023-11-26 ENCOUNTER — APPOINTMENT (OUTPATIENT)
Dept: CT IMAGING | Facility: CLINIC | Age: 73
DRG: 175 | End: 2023-11-26
Attending: STUDENT IN AN ORGANIZED HEALTH CARE EDUCATION/TRAINING PROGRAM
Payer: COMMERCIAL

## 2023-11-26 LAB
GLUCOSE BLDC GLUCOMTR-MCNC: 135 MG/DL (ref 70–99)
HGB BLD-MCNC: 7.9 G/DL (ref 13.3–17.7)
UFH PPP CHRO-ACNC: 0.39 IU/ML

## 2023-11-26 PROCEDURE — 99233 SBSQ HOSP IP/OBS HIGH 50: CPT | Performed by: STUDENT IN AN ORGANIZED HEALTH CARE EDUCATION/TRAINING PROGRAM

## 2023-11-26 PROCEDURE — 250N000013 HC RX MED GY IP 250 OP 250 PS 637: Performed by: STUDENT IN AN ORGANIZED HEALTH CARE EDUCATION/TRAINING PROGRAM

## 2023-11-26 PROCEDURE — 999N000157 HC STATISTIC RCP TIME EA 10 MIN

## 2023-11-26 PROCEDURE — 250N000012 HC RX MED GY IP 250 OP 636 PS 637: Performed by: STUDENT IN AN ORGANIZED HEALTH CARE EDUCATION/TRAINING PROGRAM

## 2023-11-26 PROCEDURE — 94640 AIRWAY INHALATION TREATMENT: CPT | Mod: 76

## 2023-11-26 PROCEDURE — 85018 HEMOGLOBIN: CPT | Performed by: STUDENT IN AN ORGANIZED HEALTH CARE EDUCATION/TRAINING PROGRAM

## 2023-11-26 PROCEDURE — 85520 HEPARIN ASSAY: CPT | Performed by: STUDENT IN AN ORGANIZED HEALTH CARE EDUCATION/TRAINING PROGRAM

## 2023-11-26 PROCEDURE — 250N000011 HC RX IP 250 OP 636: Mod: JZ | Performed by: STUDENT IN AN ORGANIZED HEALTH CARE EDUCATION/TRAINING PROGRAM

## 2023-11-26 PROCEDURE — 94640 AIRWAY INHALATION TREATMENT: CPT

## 2023-11-26 PROCEDURE — 250N000009 HC RX 250: Performed by: STUDENT IN AN ORGANIZED HEALTH CARE EDUCATION/TRAINING PROGRAM

## 2023-11-26 PROCEDURE — 250N000011 HC RX IP 250 OP 636: Mod: JZ | Performed by: EMERGENCY MEDICINE

## 2023-11-26 PROCEDURE — 70450 CT HEAD/BRAIN W/O DYE: CPT

## 2023-11-26 PROCEDURE — 36415 COLL VENOUS BLD VENIPUNCTURE: CPT | Performed by: STUDENT IN AN ORGANIZED HEALTH CARE EDUCATION/TRAINING PROGRAM

## 2023-11-26 PROCEDURE — 210N000002 HC R&B HEART CARE

## 2023-11-26 RX ORDER — IPRATROPIUM BROMIDE AND ALBUTEROL SULFATE 2.5; .5 MG/3ML; MG/3ML
3 SOLUTION RESPIRATORY (INHALATION) ONCE
Status: COMPLETED | OUTPATIENT
Start: 2023-11-26 | End: 2023-11-26

## 2023-11-26 RX ORDER — DIPHENHYDRAMINE HYDROCHLORIDE 50 MG/ML
25 INJECTION INTRAMUSCULAR; INTRAVENOUS ONCE
Status: COMPLETED | OUTPATIENT
Start: 2023-11-26 | End: 2023-11-26

## 2023-11-26 RX ORDER — METHYLPREDNISOLONE SODIUM SUCCINATE 125 MG/2ML
125 INJECTION, POWDER, LYOPHILIZED, FOR SOLUTION INTRAMUSCULAR; INTRAVENOUS
Status: DISCONTINUED | OUTPATIENT
Start: 2023-11-26 | End: 2023-11-28 | Stop reason: HOSPADM

## 2023-11-26 RX ADMIN — APIXABAN 10 MG: 5 TABLET, FILM COATED ORAL at 12:14

## 2023-11-26 RX ADMIN — EPINEPHRINE 0.3 MG: 1 INJECTION INTRAMUSCULAR; INTRAVENOUS; SUBCUTANEOUS at 12:46

## 2023-11-26 RX ADMIN — IPRATROPIUM BROMIDE AND ALBUTEROL SULFATE 3 ML: .5; 3 SOLUTION RESPIRATORY (INHALATION) at 19:35

## 2023-11-26 RX ADMIN — DIPHENHYDRAMINE HYDROCHLORIDE 25 MG: 50 INJECTION INTRAMUSCULAR; INTRAVENOUS at 12:47

## 2023-11-26 RX ADMIN — HEPARIN SODIUM 2250 UNITS/HR: 10000 INJECTION, SOLUTION INTRAVENOUS at 05:31

## 2023-11-26 RX ADMIN — Medication 3 MG: at 21:47

## 2023-11-26 RX ADMIN — FAMOTIDINE 20 MG: 10 INJECTION, SOLUTION INTRAVENOUS at 12:48

## 2023-11-26 RX ADMIN — TRAZODONE HYDROCHLORIDE 100 MG: 100 TABLET ORAL at 21:45

## 2023-11-26 RX ADMIN — RIVAROXABAN 15 MG: 15 TABLET, FILM COATED ORAL at 21:44

## 2023-11-26 RX ADMIN — PREDNISONE 40 MG: 20 TABLET ORAL at 08:03

## 2023-11-26 RX ADMIN — IPRATROPIUM BROMIDE AND ALBUTEROL SULFATE 3 ML: .5; 3 SOLUTION RESPIRATORY (INHALATION) at 12:45

## 2023-11-26 RX ADMIN — ESCITALOPRAM OXALATE 10 MG: 10 TABLET ORAL at 20:04

## 2023-11-26 RX ADMIN — IPRATROPIUM BROMIDE AND ALBUTEROL SULFATE 3 ML: .5; 3 SOLUTION RESPIRATORY (INHALATION) at 07:19

## 2023-11-26 ASSESSMENT — ACTIVITIES OF DAILY LIVING (ADL)
ADLS_ACUITY_SCORE: 24
ADLS_ACUITY_SCORE: 26
ADLS_ACUITY_SCORE: 24
ADLS_ACUITY_SCORE: 24
ADLS_ACUITY_SCORE: 26
ADLS_ACUITY_SCORE: 24
ADLS_ACUITY_SCORE: 26
ADLS_ACUITY_SCORE: 26
ADLS_ACUITY_SCORE: 24
ADLS_ACUITY_SCORE: 24
ADLS_ACUITY_SCORE: 26
ADLS_ACUITY_SCORE: 26

## 2023-11-26 NOTE — SIGNIFICANT EVENT
Significant Event Note    Time of event: ~ 12:40pm    Description of event:  - rapid response called  - pt had sensation of swollen throat and shortness of breath  - Apixaban given per MAR at 12:14pm    -pt seen immediately w/ rapid team; pt reported symptoms starting to subside  - on exam with bilateral breath sounds    Plan:  - iv pepcid, IM epinephrine, and iv benadryl stat  - duoneb treatment  - pt reported good response with subsiding symptoms  - I discussed w/ pharmacist Walt about when to try transitioning blood thinners-- discussed w/ pt as well and we will give xarelto at 10pm; provide direct sign-out for nursing team and also to evening physician   -given symptoms improved, will have prn x1 methylprednisolone injection available if any recurrent symptoms  - pt later reported new double vision which lasted seconds and subsided then returned around 1pm   - in response to the neurologic change and recent clots, order Stat CT Head w/o cont    Haider Davis MD

## 2023-11-26 NOTE — PLAN OF CARE
Problem: Gas Exchange Impaired  Goal: Optimal Gas Exchange  Outcome: Progressing   Goal Outcome Evaluation:    Patient is alert and oriented. Pt endorses 5/10 pain in right leg, but is manageable. Pt is NSR with first degree and BBB. Pt remains on heparin at 2,250 units/hr.    Queta York RN

## 2023-11-26 NOTE — PROGRESS NOTES
"[facility]    Medicine Progress Note - Hospitalist Service    Date of Admission:  11/21/2023    Assessment & Plan   Mesfin Lee is a 73 year old male admitted on 11/21/2023. He has a pmhx of mdd, insomnia, htn, prior clots, noted w/ RBBB, cirrhotic liver, s/p right colectomy for colon cancer earlier this year in October; who is admitted 11/21/2023 on a heparin drip after being found with Nonocclusive to occlusive DVT extending from the mid right femoral vein into the popliteal and calf veins and also Acute bilateral pulmonary emboli with secondary signs of right heart strain. On admission pt was hypoxic but stable on 2L of 02 via NC, also noted with frequent PVCs but EKG appears stable. Notably, received lovenox x28 days after the colectomy, but pt did not ever start as he was 'waiting for pharmacy to call.'    Echo TAPSE was normal, which is consistent with normal right ventricular systolic function. With ongoing hypoxia, adding duonebs for symptoms support. Consulted Hematology/Oncology. Pt remains on heparin and hypoxia improving. Checked BNP; has a documentation of both \"copd\" and \"no copd\" but trial prednisone 40mg clinically helping, now on 1L NC.    Heme/onc recommended 48-72 more hours on heparin drip on their consult, before transitioning to Eliquis or Xarelto. Plan to transition to PO eliquis TODAY and hopefully discharge tomorrow. Hgb a bit labile so monitor closely -- now 7.9. he is to follow up w/ Dr. Tafoya's team on 12/5 regarding when to start capecitabine(2000mg bid). He is now back on Room Air.      See rapid response significant event note-- stop apixaban and start Xarelto at 10pm 15mg po BID.   Follow up on stat CT head, informed PM physician colleague  Haider Davis MD on 11/26/2023 at 1:04 PM    ----  Bilateral PE and dvt in right femoral; acute respiratory failure w/ hypoxia 2/2 PE (hx of documented copd and also no-copd)  Smoking hx of 25 yrs  Right heart " strain, known rbbb on EKG; frequent pvcs  s/p right colectomy for colon cancer in October, discharged w/ lovenox (never /started)   Anemia  A- suspect in setting of not taking prophylactic lovenox; have discussed w/ pt but recommend future longitudinal follow through. On 11/26/2023 transitioning from heparin drip to PO apixaban; discussed BOTH apixaban vs xarelto and he opts for Apixaban. Likely has underlying lung dx but would need PFTs as an outpt to confirm.   P:  -stop  heparin w/ labs to adjust rate -- at NOON   - start Apixaban 10mg po BID at NOON (x7 days, then 5mg po bid)   - follow up w/ oncology on 12/5  - Consult to heme/onc greatly appreciated   - delay capecitabine until they clear (whether 4 weeks or shorter/longer)  - echocardiogram obtained as it has higher sensitivity for right heart strain and follow up on strain   - it showed right ventricle is not well visualized. However, TAPSE is normal, which is consistent with normal right ventricular systolic function  - prn oxycodone and tylenol  - duonebs QID w/ RT adjusts  - hgb downtrending so monitor for signs/symptoms of bleed while on blood thinner   - transfuse if < 7   - continue prednisone 40mg daily through 11/27    Mdd  Insomnia  A/p- stable, cont lexapro and trazodone, added prn melatonin    Htn  A/p- stable, appropriate for age range; would consider hydralazine if elevated and symptomatic (ie headaches or vision changes)    cirrhotic liver  A/p- noted, follow clinically         Diet: Regular Diet Adult  Snacks/Supplements Adult: Ensure Enlive; Between Meals    DVT Prophylaxis: Pneumatic Compression Devices, Ambulate every shift, and he is on heparin drip  Rachel Catheter: Not present  Lines: None     Cardiac Monitoring: ACTIVE order. Indication: Tachyarrhythmias, acute (48 hours)  Code Status: Full Code      Clinically Significant Risk Factors                   # Hypertension: Noted on problem list        # Obesity: Estimated body mass  "index is 35.82 kg/m  as calculated from the following:    Height as of this encounter: 1.981 m (6' 6\").    Weight as of this encounter: 140.6 kg (310 lb).             Disposition Plan      Expected Discharge Date: 11/27/2023      Destination: home  Discharge Comments: extensive DVT & PE. on heparin gtt hem/onc to see re: coagulopathy            Haider Davis MD  Hospitalist Service  [facility]  Securely message with [employer] (more info)  Text page via [employer] Paging/Directory   ______________________________________________________________________    Interval History   - no acute events ov  - now on room air!  No new symptoms or concerns  - discussed regimen for apixaban vs xarelto at-length and he opts for apixban  - pain stable      Physical Exam   Vital Signs: Temp: 98.7  F (37.1  C) Temp src: Oral BP: 136/69 Pulse: 62   Resp: 20 SpO2: 94 % O2 Device: Nasal cannula Oxygen Delivery: 2 LPM  Weight: 310 lbs 0 oz    General: alert, oriented, and in no acute distress  Pulmonary: coarse but relatively clear and on room air  CVS: regular rate and rhythm though tele shows PVCs, no murmurs, rubs, or gallops; no blatant jugular venous distention; no extremity edema and extremities are warm to the touch  GI: soft, nontender, BS+, no rebound or guarding, no conspicuous organomegaly   Neuro: nonfocal, moves all extremities of own volition  Psych: appropriate     Medical Decision Making       49 MINUTES SPENT BY ME on the date of service doing chart review, history, exam, documentation & further activities per the note.      Data   ------------------------- PAST 24 HR DATA REVIEWED -----------------------------------------------    I have personally reviewed the following data over the past 24 hrs:    N/A  \   7.9 (L)   / N/A     N/A N/A N/A /  N/A   N/A N/A N/A \       Imaging results reviewed over the past 24 hrs:   No results found for this or any previous visit (from the past 24 hour(s)).    "

## 2023-11-26 NOTE — PROGRESS NOTES
RT Note:    Scheduled neb given per order. Pt seen on room air with diminished;clear breath sounds. Pt resting in bed watching tv. Will continue to monitor and assess.

## 2023-11-26 NOTE — PROGRESS NOTES
RRT called.  Duoneb given for increased SOB.  SOB subsided post treatment.  BS: increased aeration post treatment.  Pt on 3L.

## 2023-11-26 NOTE — PROGRESS NOTES
Patient received neb treatment per MD order. Pt educated with the explanation of the medication they received. Patient showed understanding with verbalization.  BS: clear and diminished.  No change post treatment.  Pt on 2L.     Liana Reddy, RT on 11/26/2023 at 7:20 AM

## 2023-11-27 ENCOUNTER — APPOINTMENT (OUTPATIENT)
Dept: PHYSICAL THERAPY | Facility: CLINIC | Age: 73
DRG: 175 | End: 2023-11-27
Attending: STUDENT IN AN ORGANIZED HEALTH CARE EDUCATION/TRAINING PROGRAM
Payer: COMMERCIAL

## 2023-11-27 ENCOUNTER — APPOINTMENT (OUTPATIENT)
Dept: OCCUPATIONAL THERAPY | Facility: CLINIC | Age: 73
DRG: 175 | End: 2023-11-27
Attending: STUDENT IN AN ORGANIZED HEALTH CARE EDUCATION/TRAINING PROGRAM
Payer: COMMERCIAL

## 2023-11-27 LAB
ERYTHROCYTE [DISTWIDTH] IN BLOOD BY AUTOMATED COUNT: 20.7 % (ref 10–15)
HCT VFR BLD AUTO: 27 % (ref 40–53)
HGB BLD-MCNC: 8 G/DL (ref 13.3–17.7)
MCH RBC QN AUTO: 20.5 PG (ref 26.5–33)
MCHC RBC AUTO-ENTMCNC: 29.6 G/DL (ref 31.5–36.5)
MCV RBC AUTO: 69 FL (ref 78–100)
PLATELET # BLD AUTO: 250 10E3/UL (ref 150–450)
RBC # BLD AUTO: 3.91 10E6/UL (ref 4.4–5.9)
WBC # BLD AUTO: 8.3 10E3/UL (ref 4–11)

## 2023-11-27 PROCEDURE — 210N000002 HC R&B HEART CARE

## 2023-11-27 PROCEDURE — 85027 COMPLETE CBC AUTOMATED: CPT | Performed by: EMERGENCY MEDICINE

## 2023-11-27 PROCEDURE — 94640 AIRWAY INHALATION TREATMENT: CPT

## 2023-11-27 PROCEDURE — 999N000157 HC STATISTIC RCP TIME EA 10 MIN

## 2023-11-27 PROCEDURE — 97535 SELF CARE MNGMENT TRAINING: CPT | Mod: GO

## 2023-11-27 PROCEDURE — 99232 SBSQ HOSP IP/OBS MODERATE 35: CPT | Performed by: STUDENT IN AN ORGANIZED HEALTH CARE EDUCATION/TRAINING PROGRAM

## 2023-11-27 PROCEDURE — 250N000012 HC RX MED GY IP 250 OP 636 PS 637: Performed by: STUDENT IN AN ORGANIZED HEALTH CARE EDUCATION/TRAINING PROGRAM

## 2023-11-27 PROCEDURE — 250N000013 HC RX MED GY IP 250 OP 250 PS 637: Performed by: STUDENT IN AN ORGANIZED HEALTH CARE EDUCATION/TRAINING PROGRAM

## 2023-11-27 PROCEDURE — 36415 COLL VENOUS BLD VENIPUNCTURE: CPT | Performed by: EMERGENCY MEDICINE

## 2023-11-27 PROCEDURE — 250N000009 HC RX 250: Performed by: STUDENT IN AN ORGANIZED HEALTH CARE EDUCATION/TRAINING PROGRAM

## 2023-11-27 PROCEDURE — 94640 AIRWAY INHALATION TREATMENT: CPT | Mod: 76

## 2023-11-27 PROCEDURE — 97166 OT EVAL MOD COMPLEX 45 MIN: CPT | Mod: GO

## 2023-11-27 PROCEDURE — 97161 PT EVAL LOW COMPLEX 20 MIN: CPT | Mod: GP

## 2023-11-27 PROCEDURE — 250N000011 HC RX IP 250 OP 636: Mod: JZ | Performed by: STUDENT IN AN ORGANIZED HEALTH CARE EDUCATION/TRAINING PROGRAM

## 2023-11-27 RX ADMIN — FAMOTIDINE 20 MG: 10 INJECTION, SOLUTION INTRAVENOUS at 13:10

## 2023-11-27 RX ADMIN — TRAZODONE HYDROCHLORIDE 100 MG: 100 TABLET ORAL at 21:05

## 2023-11-27 RX ADMIN — ESCITALOPRAM OXALATE 10 MG: 10 TABLET ORAL at 21:05

## 2023-11-27 RX ADMIN — FAMOTIDINE 20 MG: 10 INJECTION, SOLUTION INTRAVENOUS at 00:39

## 2023-11-27 RX ADMIN — IPRATROPIUM BROMIDE AND ALBUTEROL SULFATE 3 ML: .5; 3 SOLUTION RESPIRATORY (INHALATION) at 08:00

## 2023-11-27 RX ADMIN — IPRATROPIUM BROMIDE AND ALBUTEROL SULFATE 3 ML: .5; 3 SOLUTION RESPIRATORY (INHALATION) at 20:44

## 2023-11-27 RX ADMIN — RIVAROXABAN 15 MG: 15 TABLET, FILM COATED ORAL at 17:09

## 2023-11-27 RX ADMIN — PREDNISONE 40 MG: 20 TABLET ORAL at 08:37

## 2023-11-27 RX ADMIN — OXYCODONE HYDROCHLORIDE 5 MG: 5 TABLET ORAL at 21:13

## 2023-11-27 RX ADMIN — RIVAROXABAN 15 MG: 15 TABLET, FILM COATED ORAL at 08:37

## 2023-11-27 RX ADMIN — Medication 3 MG: at 23:07

## 2023-11-27 ASSESSMENT — ACTIVITIES OF DAILY LIVING (ADL)
ADLS_ACUITY_SCORE: 26
PREVIOUS_RESPONSIBILITIES: MEAL PREP;HOUSEKEEPING;LAUNDRY;SHOPPING;MEDICATION MANAGEMENT;FINANCES;DRIVING
ADLS_ACUITY_SCORE: 26
ADLS_ACUITY_SCORE: 24
ADLS_ACUITY_SCORE: 26

## 2023-11-27 NOTE — PROGRESS NOTES
11/27/23 1410   Appointment Info   Signing Clinician's Name / Credentials (PT) Selina Feldman PT   Living Environment   People in Home alone   Current Living Arrangements independent living facility   Home Accessibility no concerns   Transportation Anticipated family or friend will provide;car, drives self   Self-Care   Equipment Currently Used at Home cane, straight;walker, rolling  (4WW)   Activity/Exercise/Self-Care Comment independent ADL's/IADL's; drives   General Information   Onset of Illness/Injury or Date of Surgery 11/21/23   Referring Physician Dr. Davis   Patient/Family Therapy Goals Statement (PT) none stated   Pertinent History of Current Problem (include personal factors and/or comorbidities that impact the POC) hypoxia, B PE, RLE DVT; PMH of mdd, insomnia, htn, prior clots, noted w/ RBBB, cirrhotic liver, s/p right colectomy for colon cancer earlier this year in October   Existing Precautions/Restrictions oxygen therapy device and L/min;fall   Cognition   Affect/Mental Status (Cognition) WFL   Orientation Status (Cognition) oriented x 4   Follows Commands (Cognition) WFL   Range of Motion (ROM)   Range of Motion ROM is WFL   Strength (Manual Muscle Testing)   Strength (Manual Muscle Testing) strength is WFL   Bed Mobility   Bed Mobility supine-sit   Supine-Sit [place] (Bed Mobility) independent   Transfers   Transfers sit-stand transfer;toilet transfer   Sit-Stand Transfer   Sit-Stand [place] (Transfers) supervision;verbal cues;nonverbal cues (demo/gesture);modified independence   Assistive Device (Sit-Stand Transfers) walker, front-wheeled   Toilet Transfer   [place]entry Level (Toilet Transfer) modified independence   Assistive Device (Toilet Transfer) walker, front-wheeled   Type (Toilet Transfer) sit-stand;stand-sit   Gait/Stairs (Locomotion)   [place]entry Level (Gait) modified independence   Assistive Device (Gait) walker, front-wheeled   Distance in Feet (Gait) 170'x2   Pattern (Gait)  step-through   Deviations/Abnormal Patterns (Gait) other (see comments)  (O2 RA 90-93% post ambulation)   Clinical Impression   Criteria for Skilled Therapeutic Intervention Evaluation only   Clinical Presentation (PT Evaluation Complexity) stable   Clinical Presentation Rationale pt presents as medically diagnosed   Clinical Decision Making (Complexity) low complexity   Risk & Benefits of therapy have been explained evaluation/treatment results reviewed;patient   PT Total Evaluation Time   PT Eval, Low Complexity Minutes (87645) 20   PT Discharge Planning   PT Plan d/c PT   PT Discharge Recommendation (DC Rec) home   PT Rationale for DC Rec pt ambulating independently with use of walker   PT Brief overview of current status amb. 170 feet with walker mod independent   PT Equipment Needed at Discharge walker, rolling  (pt has 4WW at home)   Total Session Time   Total Session Time (sum of timed and untimed services) 20

## 2023-11-27 NOTE — PROGRESS NOTES
Care Management Follow Up    Length of Stay (days): 6    Expected Discharge Date: 11/27/2023     Concerns to be Addressed: discharge planning       Patient plan of care discussed at interdisciplinary rounds: Yes    Anticipated Discharge Disposition: Home (independent living [facility]     Anticipated Discharge Services: None    Anticipated Discharge DME: None      Education Provided on the Discharge Plan: Yes (AVS per bedside RN)    Patient/Family in Agreement with the Plan: yes        Additional Information:  CM reviewed chart. No CM needs identified. Anticipate family will provide transportation home at discharge.     Katrina Benson RN

## 2023-11-27 NOTE — PLAN OF CARE
Problem: Adult Inpatient Plan of Care  Goal: Readiness for Transition of Care  Outcome: Progressing   Goal Outcome Evaluation:    Patient is alert and oriented. Pt NSR with first degree, BBB, & prolonged Qtc.     Heparin gtt stopped at 1214 and PO eliquis given. About fifteen minutes later, NA went to bedside, Pt stated he felt he was having a reaction to his medication. RN and Charge RN went to bedside to assess. Pt's face was red, he had increased work of breathing and was tremulous. Noted wheezing on upper lobes. 3 L O2 was placed on Pt and rapid response was called. MD at bedside, reversal medications given. Pt felt improved breathing, lungs sounds were clear and Pt calmed.     After incident, while writer and MD were still at bedside, Pt had episode of staring off with brief lack of awareness. Later stated his vision was blurry during incident. MD ordered STAT head CT.  Since episodes, Pt remains alert and oriented and is back to baseline. Plan is to start PO xarelto this evening.    Writer updated Pt's daughter.    Queta York RN

## 2023-11-27 NOTE — PROGRESS NOTES
"[facility]    Medicine Progress Note - Hospitalist Service    Date of Admission:  11/21/2023    Assessment & Plan   Summary (can use for eventual discharge):  Mesfin Lee is a 73 year old male admitted on 11/21/2023. He has a pmhx of mdd, insomnia, htn, prior clots, noted w/ RBBB, cirrhotic liver, s/p right colectomy for colon cancer earlier this year in October; who was admitted 11/21/2023 on a heparin drip after being found with Nonocclusive to occlusive DVT extending from the mid right femoral vein into the popliteal and calf veins and also Acute bilateral pulmonary emboli with secondary signs of right heart strain. On admission pt was hypoxic but stable on 2L of 02 via NC, also noted with frequent PVCs but EKG stable. Notably, he was supposed to be on lovenox x28 days after the colectomy, but pt did not ever start the medication as he was 'waiting for pharmacy to call.'    Echo notably w/ TAPSE normal, consistent with normal right ventricular systolic function. With hypoxia, given duonebs for symptoms support. He has documentation of both \"copd\" and \"no copd\" but trial prednisone 40mg clinically helped so weaned down to 1L of NC.     Consulted Hematology/Oncology. They recommended 48-72 more hours on heparin drip upon their consult, before transitioning to Eliquis or Xarelto. Discussed Eliquis vs Xarelto and pt opted for the former given shorter time on a higher dose (1 vs 3 weeks); unfortunately, a rapid response was called on 11/26 afternoon when he experienced a potential medication reaction or hypersensitivity; he had sensation of throat swelling- he was given iv pepcid, IM epinephrine, and iv benadryl and duoneb treatment with resolution of symptoms. After discussion w/ pharmacy, plan re-adjusted for Xarelto 15mg po BID x3 weeks before a 20mg daily regimen. Moreover, he then developed blurry vision afternoon 11/26 and given his clot hx, a CT Head was obtained which fortunately " did not show any acute findings.     He received his first dose of Xarelto 15mg at 10pm on 11/26. He tolerated it well without any throat swelling symptoms or vision changes. On 11/27/2023, we discussed potential discharge w/ close follow up vs monitoring one more night, and he feels most comfortable with ensuring no further issues with a full day of therapy on the Xarelto; as such, will order PT and OT to assess in the meantime and likely discharge tomorrow 11/28 if no further events. Will need heme/onc follow up about when to start capecitabine (4 weeks, plus or minus, delay after hospital discharge).     ----  Bilateral PE and dvt in right femoral; acute respiratory failure w/ hypoxia 2/2 PE (hx of documented copd and also no-copd)  Smoking hx of 25 yrs  Right heart strain, known rbbb on EKG; frequent pvcs  s/p right colectomy for colon cancer in October, discharged w/ lovenox (never /started)   Anemia  Possible medication reaction to apixaban   A- suspect in setting of not taking prophylactic lovenox; have discussed w/ pt but recommend future longitudinal follow through. On 11/26/2023 transitioning from heparin drip to PO; see directly above about a potential medication reaction to apixaban. Now on xarelto.  P:  - xarelto 15mg po bid x3 weeks, then 20mg po every day; further adjustment per oncology at follow up   - follow up w/ oncology on 12/5  - Consult to heme/onc greatly appreciated   - delay capecitabine until they clear (whether 4 weeks or shorter/longer)  - echocardiogram obtained as it has higher sensitivity for right heart strain and follow up on strain   - it showed right ventricle is not well visualized. However, TAPSE is normal, which is consistent with normal right ventricular systolic function  - prn oxycodone and tylenol  - duonebs QID w/ RT adjusts  - hgb downtrending so monitor for signs/symptoms of bleed while on blood thinner   - transfuse if < 7   - continue prednisone 40mg daily  "through 11/27    Mdd  Insomnia  A/p- stable, cont lexapro and trazodone, added prn melatonin    Htn  A/p- stable, appropriate for age range; would consider hydralazine if elevated and symptomatic (ie headaches or vision changes)    cirrhotic liver  A/p- noted, follow clinically         Diet: Regular Diet Adult  Snacks/Supplements Adult: Ensure Enlive; Between Meals    DVT Prophylaxis: Pneumatic Compression Devices, Ambulate every shift, and he is on heparin drip  Rachel Catheter: Not present  Lines: None     Cardiac Monitoring: ACTIVE order. Indication: Tachyarrhythmias, acute (48 hours)  Code Status: Full Code      Clinically Significant Risk Factors                   # Hypertension: Noted on problem list        # Obesity: Estimated body mass index is 35.82 kg/m  as calculated from the following:    Height as of this encounter: 1.981 m (6' 6\").    Weight as of this encounter: 140.6 kg (310 lb).             Disposition Plan      Expected Discharge Date: 11/27/2023      Destination: home  Discharge Comments: extensive DVT & PE. on heparin gtt hem/onc to see re: coagulopathy            Haider Davis MD  Hospitalist Service  [facility]  Securely message with [employer] (more info)  Text page via [employer] Paging/Directory   ______________________________________________________________________    Interval History   - signifciant events as outlined in summary   - now on 1L NC, being weaned if able, discussed w/ RN  - pt has no new symptoms, still feeling very anxious about oral blood thinners  - he tolerated xarelto x1 dose last night; no recurrent throat swelling or blurry vision  - he would feel most comfortable if on xarelto x24 hrs  - discussed having PT and OT assess-- though he says he would probably decline any therapy but might be open to it  - no new concerns or questions  - he exhibits his essential tremor as since admission  - pain stable   -discussed w/ sw too       Physical Exam   Vital " Signs: Temp: 98.5  F (36.9  C) Temp src: Oral BP: (!) 144/79 (RN notified) Pulse: 67   Resp: 18 SpO2: 95 % O2 Device: Nasal cannula Oxygen Delivery: 1 LPM  Weight: 310 lbs 0 oz    General: alert, oriented, and in no acute distress  Pulmonary: ctab  CVS: regular rate and rhythm though tele shows PVCs, no murmurs, rubs, or gallops; no blatant jugular venous distention; no extremity edema and extremities are warm to the touch  GI: soft, nontender, BS+, no rebound or guarding, no conspicuous organomegaly   Neuro: nonfocal, moves all extremities of own volition  Psych: appropriate     Medical Decision Making       55 MINUTES SPENT BY ME on the date of service doing chart review, history, exam, documentation & further activities per the note.      Data   ------------------------- PAST 24 HR DATA REVIEWED -----------------------------------------------    I have personally reviewed the following data over the past 24 hrs:    8.3  \   8.0 (L)   / 250     N/A N/A N/A /  135 (H)   N/A N/A N/A \       Imaging results reviewed over the past 24 hrs:   Recent Results (from the past 24 hour(s))   CT Head w/o Contrast    Narrative    EXAM: CT HEAD W/O CONTRAST  LOCATION: [facility]  DATE: 11/26/2023    INDICATION: New blurry vision in a patient with DVT and bilateral PE, has been on heparin until first dose of apixaban today at noon.  COMPARISON: CT dated 12/31/2021.   TECHNIQUE: Routine CT Head without IV contrast. Multiplanar reformats. Dose reduction techniques were used.    FINDINGS:  INTRACRANIAL CONTENTS: No acute intracranial hemorrhage, extra-axial fluid collection, or mass effect. No evidence of an acute transcortical confluent infarct. Grossly similar mild presumed chronic small vessel ischemic changes. Moderate generalized   cerebral parenchymal volume loss. No hydrocephalus.     VISUALIZED ORBITS/SINUSES/MASTOIDS: No acute intraorbital finding. Trace bilateral maxillary sinus mucosal thickening  without an air-fluid level. A punctate right anterior ethmoid sinus osteoma. No mastoid or middle ear effusion.    BONES/SOFT TISSUES: No acute abnormality. Torus palatinus.      Impression    IMPRESSION:  1.  No acute intracranial abnormality.

## 2023-11-27 NOTE — PROGRESS NOTES
11/27/23 1420   Appointment Info   Signing Clinician's Name / Credentials (OT) JOEL Rivero   Student Supervision Therapy services provided with the co-signing licensed therapist guiding and directing the services, and providing the skilled judgement and assessment throughout the session   Living Environment   People in Home alone   Current Living Arrangements independent living facility   Living Environment Comments WIS with one GB, RTS with counter to L   Self-Care   Usual Activity Tolerance good   Current Activity Tolerance good   Instrumental Activities of Daily Living (IADL)   Previous Responsibilities meal prep;housekeeping;laundry;shopping;medication management;finances;driving   IADL Comments Daughter and son to assist as needed   General Information   Onset of Illness/Injury or Date of Surgery 11/21/23   Referring Physician Dr. Haider Davis   Patient/Family Therapy Goal Statement (OT) To go home   Additional Occupational Profile Info/Pertinent History of Current Problem Pt admitted with hypoxia with dx of blood clots in R leg and lungs. PMH: MDD, HTN, prior clots, cirrohtic liver and colectomy.   Existing Precautions/Restrictions (S)  oxygen therapy device and L/min   Cognitive Status Examination   Orientation Status orientation to person, place and time   Follows Commands WNL   Visual Perception   Visual Impairment/Limitations WNL   Range of Motion Comprehensive   General Range of Motion no range of motion deficits identified   Strength Comprehensive (MMT)   General Manual Muscle Testing (MMT) Assessment no strength deficits identified   Muscle Tone Assessment   Muscle Tone Quick Adds No deficits were identified   Coordination   Upper Extremity Coordination No deficits were identified   Fine Motor Coordination Persistent bilat hand tremor   Transfers   Transfers sit-stand transfer;bed-chair transfer;toilet transfer   Transfer Skill: Bed to Chair/Chair to Bed   Bed-Chair [place] (Transfers)  modified independence   Assistive Device (Bed-Chair Transfers) standard walker   Sit-Stand Transfer   Sit-Stand [place] (Transfers) modified independence   Assistive Device (Sit-Stand Transfers) walker, front-wheeled   Toilet Transfer   Type (Toilet Transfer) sit-stand;stand-sit   [place] Level (Toilet Transfer) modified independence   Assistive Device (Toilet Transfer) grab bars/safety frame;walker, front-wheeled   Balance   Balance Assessment standing static balance   Standing Balance: Static modified independence   Position/Device Used, Standing Balance supported;walker, front-wheeled   Activities of Daily Living   BADL Assessment/Intervention lower body dressing   Lower Body Dressing Assessment/Training   [place] Level (Lower Body Dressing) lower body dressing skills;independent   Clinical Impression   Criteria for Skilled Therapeutic Interventions Met (OT) Yes, treatment indicated   OT Diagnosis Decreased independence with ADL's and transfers   Influenced by the following impairments Due to hypoxia   OT Problem List-Impairments impacting ADL problems related to;activity tolerance impaired   Assessment of Occupational Performance 3-5 Performance Deficits   Identified Performance Deficits G/H, Toileting, Bathing, Transfers, self feeding due to tremors   Planned Therapy Interventions (OT) ADL retraining   Clinical Decision Making Complexity (OT) detailed assessment/moderate complexity   Risk & Benefits of therapy have been explained evaluation/treatment results reviewed;participants included;patient   OT Total Evaluation Time   OT Eval, Moderate Complexity Minutes (81208) 15   OT Goals   Therapy Frequency (OT) One time eval and treatment   OT Predicted Duration/Target Date for Goal Attainment 11/27/23   OT Goals Lower Body Dressing;Transfers   OT: Lower Body Dressing Independent   OT: Transfer Modified independent;with assistive device   Interventions   Interventions Quick Adds Self-Care/Home  Management   Self-Care/Home Management   Self-Care/Home Mgmt/ADL, Compensatory, Meal Prep Minutes (67100) 25   Symptoms Noted During/After Treatment (Meal Preparation/Planning Training) shortness of breath   Treatment Detail/Skilled Intervention Pt in chair when therapist arrived. On RA with O2 stats 96%. Pt completed LB dressing independently using figure 4 method to reuben socks. While reaching forward, O2 stats dropped to 88% on RA. Therapist educated on use of PLB and to lean back into chair. Pt demonstrated understanding of teaching. Pt competed transfers to chair and toilet with mod I and use of FWW. Therapist educated on use of PLB and activity pacing when moving. When returhing from BR, O2 stats 88%, with cues for PLB with rest Pt was able to recover into low 90's. At end of session, Pt was seated in the chair with call light within reach. Nsg not using chair alarm.  RN aware Pt mentioned he was going to look into purchasing a shower chair once out of the hospital   OT Discharge Planning   OT Plan DC OT   OT Discharge Recommendation (DC Rec) home with assist   OT Rationale for DC Rec Pt is independent with ADL's and IADL's and has support at home from family if needed   OT Brief overview of current status OT goals met   OT Equipment Needed at Discharge (S)  shower chair   Total Session Time   Timed Code Treatment Minutes 25   Total Session Time (sum of timed and untimed services) 40

## 2023-11-27 NOTE — PLAN OF CARE
Problem: VTE (Venous Thromboembolism)  Goal: Tissue Perfusion  Outcome: Progressing  Goal: Right Ventricular Function  Outcome: Progressing     Problem: Gas Exchange Impaired  Goal: Optimal Gas Exchange  Outcome: Progressing  Intervention: Optimize Oxygenation and Ventilation  Recent Flowsheet Documentation  Taken 11/27/2023 0400 by Sheree Rich RN  Head of Bed ([facility]) Positioning: HOB at 20-30 degrees  Taken 11/27/2023 0300 by Sheree Rich RN  Head of Bed ([facility]) Positioning: HOB at 20-30 degrees  Taken 11/27/2023 0100 by Sheree Rich RN  Head of Bed ([facility]) Positioning: HOB at 20-30 degrees   Goal Outcome Evaluation:         Pt Aox4. Denies pain, sob, n/v. Lung sounds clear and diminished. On 1-2L NC during sleep. VSS. 1st degree AV block with prolonged QTc on tele. Call light within reach.

## 2023-11-28 VITALS
DIASTOLIC BLOOD PRESSURE: 63 MMHG | RESPIRATION RATE: 20 BRPM | WEIGHT: 309.3 LBS | HEART RATE: 68 BPM | SYSTOLIC BLOOD PRESSURE: 137 MMHG | OXYGEN SATURATION: 92 % | TEMPERATURE: 97.8 F | HEIGHT: 78 IN | BODY MASS INDEX: 35.79 KG/M2

## 2023-11-28 LAB
CREAT SERPL-MCNC: 0.88 MG/DL (ref 0.67–1.17)
EGFRCR SERPLBLD CKD-EPI 2021: >90 ML/MIN/1.73M2
HGB BLD-MCNC: 7.9 G/DL (ref 13.3–17.7)

## 2023-11-28 PROCEDURE — 94640 AIRWAY INHALATION TREATMENT: CPT

## 2023-11-28 PROCEDURE — 85018 HEMOGLOBIN: CPT | Performed by: STUDENT IN AN ORGANIZED HEALTH CARE EDUCATION/TRAINING PROGRAM

## 2023-11-28 PROCEDURE — 250N000011 HC RX IP 250 OP 636: Mod: JZ | Performed by: STUDENT IN AN ORGANIZED HEALTH CARE EDUCATION/TRAINING PROGRAM

## 2023-11-28 PROCEDURE — 250N000013 HC RX MED GY IP 250 OP 250 PS 637: Performed by: STUDENT IN AN ORGANIZED HEALTH CARE EDUCATION/TRAINING PROGRAM

## 2023-11-28 PROCEDURE — 36415 COLL VENOUS BLD VENIPUNCTURE: CPT | Performed by: STUDENT IN AN ORGANIZED HEALTH CARE EDUCATION/TRAINING PROGRAM

## 2023-11-28 PROCEDURE — 999N000157 HC STATISTIC RCP TIME EA 10 MIN

## 2023-11-28 PROCEDURE — 250N000009 HC RX 250: Performed by: STUDENT IN AN ORGANIZED HEALTH CARE EDUCATION/TRAINING PROGRAM

## 2023-11-28 PROCEDURE — 99239 HOSP IP/OBS DSCHRG MGMT >30: CPT | Performed by: INTERNAL MEDICINE

## 2023-11-28 PROCEDURE — 82565 ASSAY OF CREATININE: CPT | Performed by: STUDENT IN AN ORGANIZED HEALTH CARE EDUCATION/TRAINING PROGRAM

## 2023-11-28 RX ORDER — FAMOTIDINE 20 MG/1
20 TABLET, FILM COATED ORAL 2 TIMES DAILY
Qty: 28 TABLET | Refills: 0 | Status: SHIPPED | OUTPATIENT
Start: 2023-11-28 | End: 2023-12-12

## 2023-11-28 RX ADMIN — FAMOTIDINE 20 MG: 10 INJECTION, SOLUTION INTRAVENOUS at 00:34

## 2023-11-28 RX ADMIN — IPRATROPIUM BROMIDE AND ALBUTEROL SULFATE 3 ML: .5; 3 SOLUTION RESPIRATORY (INHALATION) at 08:01

## 2023-11-28 RX ADMIN — RIVAROXABAN 15 MG: 15 TABLET, FILM COATED ORAL at 09:03

## 2023-11-28 ASSESSMENT — ACTIVITIES OF DAILY LIVING (ADL)
ADLS_ACUITY_SCORE: 26

## 2023-11-28 NOTE — PLAN OF CARE
Goal Outcome Evaluation: ongoing.       Plan of Care Reviewed With: patient    Overall Patient Progress: improvingOverall Patient Progress: improving     Pt alert and oriented x4. Able to makes needs known. Denies chest pain or SOB. On 1L overnight d/t desating when sleeping.   Tele reads SR to SA.     Problem: Gas Exchange Impaired  Goal: Optimal Gas Exchange  Outcome: Progressing  Intervention: Optimize Oxygenation and Ventilation  Recent Flowsheet Documentation  Taken 11/28/2023 0449 by Kathi Vela RN  Head of Bed (HOB) Positioning: HOB at 20-30 degrees  Taken 11/28/2023 0043 by Kathi Vela RN  Head of Bed (HOB) Positioning: HOB at 20-30 degrees     Problem: Fall Injury Risk  Goal: Absence of Fall and Fall-Related Injury  Outcome: Progressing  Intervention: Identify and Manage Contributors  Recent Flowsheet Documentation  Taken 11/28/2023 0449 by Kathi Vela RN  Medication Review/Management: medications reviewed  Taken 11/28/2023 0043 by Kathi Vela RN  Medication Review/Management: medications reviewed  Intervention: Promote Injury-Free Environment  Recent Flowsheet Documentation  Taken 11/28/2023 0449 by Kathi Vela RN  Safety Promotion/Fall Prevention:   activity supervised   assistive device/personal items within reach   mobility aid in reach   room door open   room near nurse's station   room organization consistent   safety round/check completed  Taken 11/28/2023 0043 by Kathi Vela RN  Safety Promotion/Fall Prevention:   activity supervised   assistive device/personal items within reach   mobility aid in reach   room door open   room near nurse's station   room organization consistent   safety round/check completed     Problem: VTE (Venous Thromboembolism)  Goal: Tissue Perfusion  Outcome: Progressing  Goal: Right Ventricular Function  Outcome: Progressing

## 2023-11-28 NOTE — PROGRESS NOTES
Care Management Discharge Note    Discharge Date: 11/28/2023       Discharge Disposition: Home (independent living [facility].)    Discharge Services: None    Discharge DME: None    Discharge Transportation: family or friend will provide, car, drives self    Private pay costs discussed: Not applicable    Does the patient's insurance plan have a 3 day qualifying hospital stay waiver?  No    PAS Confirmation Code:    Patient/family educated on Medicare website which has current facility and service quality ratings:      Education Provided on the Discharge Plan: Yes (AVS per bedside RN)  Persons Notified of Discharge Plans:   Patient/Family in Agreement with the Plan: yes    Handoff Referral Completed: No CM needs.     Additional Information:  Chart reviewed. MD placed discharge orders. Discharge home; no CM needs. Daughter transport.     Rasheeda Meek RN

## 2023-11-28 NOTE — PROGRESS NOTES
Pt on 2L NC with sat in the upper 90's. Neb treatment given, pt tolerated well.       11/27/23 2044   Tech Time   $Tech Time (10 minute increments) 2   Nebulizer Assessment & Treatment   $RT Use ONLY Delivery Method Nebulizer - Additional   Nebulizer Device Mask   Pretreatment Heart Rate (beats/min) 78   Pretreatment Resp Rate (breaths/min) 18   Pretreatment O2 sats - (TCU only) 94   Pretreat Breath Sounds - Bilat - All Lobes diminished;clear   Breath Sounds Post-Respiratory Treatment   Posttreatment Heart Rate (beats/min) 74   Posttreatment Resp Rate (breaths/min) 20   Post treatment O2 Sats - (TCU only) 100   Breath Sounds Posttreatment All Fields All Fields   Breath Sounds Posttreatment All Fields no change     Pallavi Cortez, RT

## 2023-11-28 NOTE — DISCHARGE SUMMARY
"[facility]  Hospitalist Discharge Summary      Date of Admission:  11/21/2023  Date of Discharge:  11/28/2023 10:19 AM  Discharging Provider: Ansley Gerardo DO  Discharge Service: Hospitalist Service    Discharge Diagnoses   Bilateral PE   Right heart strain  Provoked PE and DVT  Acute hypoxia  Apixaban reaction    Clinically Significant Risk Factors     # Obesity: Estimated body mass index is 35.74 kg/m  as calculated from the following:    Height as of this encounter: 1.981 m (6' 6\").    Weight as of this encounter: 140.3 kg (309 lb 4.8 oz).       Follow-ups Needed After Discharge   Follow-up Appointments     Follow-up and recommended labs and tests       Follow up with primary care provider, Josr Pruitt, within 3-4 days .    Follow up with your oncologist as planned.            Unresulted Labs Ordered in the Past 30 Days of this Admission       No orders found from 10/22/2023 to 11/22/2023.            Discharge Disposition   Discharged to home  Condition at discharge: Stable    Hospital Course   Mesfin Lee is a 73 year old male admitted on 11/21/2023. He has a pmhx of mdd, insomnia, htn, prior clots, noted w/ RBBB, cirrhotic liver, s/p right colectomy for colon cancer earlier this year in October; who was admitted 11/21/2023 on a heparin drip after being found with Nonocclusive to occlusive DVT extending from the mid right femoral vein into the popliteal and calf veins and also Acute bilateral pulmonary emboli with secondary signs of right heart strain. On admission pt was hypoxic but stable on 2L of 02 via NC, also noted with frequent PVCs but EKG stable. Notably, he was supposed to be on lovenox x28 days after the colectomy, but pt did not ever start the medication as he was 'waiting for pharmacy to call.'     Echo notably w/ TAPSE normal, consistent with normal right ventricular systolic function. With hypoxia, given duonebs for symptoms support. He has " "documentation of both \"copd\" and \"no copd\" but trial prednisone 40mg clinically helped so weaned down to 1L of NC.      Consulted Hematology/Oncology. They recommended 48-72 more hours on heparin drip upon their consult, before transitioning to Eliquis or Xarelto. Discussed Eliquis vs Xarelto and pt opted for the former given shorter time on a higher dose (1 vs 3 weeks); unfortunately, a rapid response was called on 11/26 afternoon when he experienced a potential medication reaction or hypersensitivity; he had sensation of throat swelling- he was given iv pepcid, IM epinephrine, and iv benadryl and duoneb treatment with resolution of symptoms. After discussion w/ pharmacy, plan re-adjusted for Xarelto 15mg po BID x3 weeks before a 20mg daily regimen. Moreover, he then developed blurry vision afternoon 11/26 and given his clot hx, a CT Head was obtained which fortunately did not show any acute findings.      He received his first dose of Xarelto 15mg at 10pm on 11/26. He tolerated it well without any throat swelling symptoms or vision changes. On 11/27/2023, we discussed potential discharge w/ close follow up vs monitoring one more night, and he feels most comfortable with ensuring no further issues with a full day of therapy on the Xarelto; as such, will order PT and OT to assess in the meantime and likely discharge tomorrow 11/28 if no further events. Will need heme/onc follow up about when to start capecitabine (4 weeks, plus or minus, delay after hospital discharge).     ----  Bilateral PE and dvt in right femoral; acute respiratory failure w/ hypoxia 2/2 PE (hx of documented copd and also no-copd)  Smoking hx of 25 yrs  Right heart strain, known rbbb on EKG; frequent pvcs  s/p right colectomy for colon cancer in October, discharged w/ lovenox (never /started)   Anemia  Possible medication reaction to apixaban   A- suspect in setting of not taking prophylactic lovenox; have discussed w/ pt but recommend " future longitudinal follow through. On 11/26/2023 transitioning from heparin drip to PO; see directly above about a potential medication reaction to apixaban. Now on xarelto.  P:  - xarelto 15mg po bid x3 weeks, then 20mg po every day; further adjustment per oncology at follow up              - follow up w/ oncology on 12/5  - Consult to heme/onc greatly appreciated              - delay capecitabine until they clear (whether 4 weeks or shorter/longer)  - echocardiogram obtained as it has higher sensitivity for right heart strain and follow up on strain              - it showed right ventricle is not well visualized. However, TAPSE is normal, which is consistent with normal right ventricular systolic function  - prn oxycodone and tylenol  - duonebs QID w/ RT adjusts  - hgb downtrending so monitor for signs/symptoms of bleed while on blood thinner              - transfuse if < 7   - continue prednisone 40mg daily through 11/27     Mdd  Insomnia  A/p- stable, cont lexapro and trazodone, added prn melatonin     Htn  A/p- stable, appropriate for age range; would consider hydralazine if elevated and symptomatic (ie headaches or vision changes)     cirrhotic liver  A/p- noted, follow clinically       Consultations This Hospital Stay   PHARMACY IP CONSULT  HEMATOLOGY & ONCOLOGY IP CONSULT  CARE MANAGEMENT / SOCIAL WORK IP CONSULT  PHYSICAL THERAPY ADULT IP CONSULT  OCCUPATIONAL THERAPY ADULT IP CONSULT    Code Status   Prior    Time Spent on this Encounter   IAnsley DO, personally saw the patient today and spent greater than 30 minutes discharging this patient.       Ansley Gerardo DO  [facility]  [address] [place] [id number]  Phone: [phone]  Fax: [phone]  ______________________________________________________________________    Physical Exam   Vital Signs: Temp: 97.8  F (36.6  C) Temp src: Oral BP: 137/63 Pulse: 68   Resp: 20 SpO2: 92  % O2 Device: None (Room air) Oxygen Delivery: 1 LPM  Weight: 309 lbs 4.8 oz         Primary Care Physician   Josr Pruitt    Discharge Orders      Reason for your hospital stay    Pulmonary embolism, Acute hypoxia     Follow-up and recommended labs and tests     Follow up with primary care provider, Josr Pruitt, within 3-4 days .  Follow up with your oncologist as planned.     Activity    Your activity upon discharge: activity as tolerated     Diet    Follow this diet upon discharge: Orders Placed This Encounter      Snacks/Supplements Adult: Ensure Enlive; Between Meals      Regular Diet Adult       Significant Results and Procedures   Most Recent 3 CBC's:  Recent Labs   Lab Test 11/28/23  0501 11/27/23  0523 11/26/23  0445 11/25/23  0611 11/24/23  0546 11/23/23  0459   WBC  --  8.3  --   --  9.2 7.9   HGB 7.9* 8.0* 7.9*   < > 8.2* 7.8*   MCV  --  69*  --   --  71* 70*   PLT  --  250  --   --  228 222    < > = values in this interval not displayed.     Most Recent 3 BMP's:  Recent Labs   Lab Test 11/28/23  0501 11/26/23  1240 11/22/23  0745 11/21/23  1621 01/26/22  0530   NA  --   --  136 139 137   POTASSIUM  --   --  4.5 4.5 4.8   CHLORIDE  --   --  102 103 104   CO2  --   --  27 27 25   BUN  --   --  13.7 14.3 27   CR 0.88  --  0.93 0.95 1.28   ANIONGAP  --   --  7 9 8   CLIF  --   --  8.3* 9.1 9.4   GLC  --  135* 104* 114* 99     Most Recent 2 LFT's:  Recent Labs   Lab Test 01/25/22  0650 01/19/22  0604   AST 41* 46*   ALT 60* 83*   ALKPHOS 78 66   BILITOTAL 0.6 0.6   ,   Results for orders placed or performed during the hospital encounter of 11/21/23   US Lower Extremity Venous Duplex Right    Narrative    EXAM: US LOWER EXTREMITY VENOUS DUPLEX RIGHT  LOCATION: [facility]  DATE: 11/21/2023    INDICATION: History of DVT; increasing swelling right leg last few days  COMPARISON: 01/16/2022  TECHNIQUE: Venous Duplex ultrasound of the right lower extremity with and without  compression, augmentation and duplex. Color flow and spectral Doppler with waveform analysis performed.    FINDINGS: Exam includes the common femoral, femoral, popliteal, and contralateral common femoral veins as well as segmentally visualized deep calf veins and greater saphenous vein.     RIGHT: Nonocclusive to occlusive DVT extending from the mid right femoral vein into the popliteal and calf veins. No superficial thrombophlebitis. No popliteal cyst.      Impression    IMPRESSION:  Nonocclusive to occlusive DVT extending from the mid right femoral vein into the popliteal and calf veins.    Findings discussed with La Solis PA-C, of the ED at 1733 hours.    CT Chest Pulmonary Embolism w Contrast     Value    Radiologist flags New diagnosis of pulmonary embolism (AA)    Narrative    EXAM: CT CHEST PULMONARY EMBOLISM W CONTRAST  LOCATION: [facility]  DATE: 11/21/2023    INDICATION: shortness of breath, new hypoxia 87  room air  COMPARISON: 1/15/2022  TECHNIQUE: CT chest pulmonary angiogram during arterial phase injection of IV contrast. Multiplanar reformats and MIP reconstructions were performed. Dose reduction techniques were used.   CONTRAST: Isovue 370 75mL    FINDINGS:  ANGIOGRAM CHEST: Extensive filling defects within the lobar, segmental, and subsegmental branches of the right pulmonary arteries, as well as segmental and subsegmental branches in the left lower lobe. Thoracic aorta is negative for dissection. RV/LV   ratio abnormal, greater than 1.0.    LUNGS AND PLEURA: Normal.    MEDIASTINUM/AXILLAE: Normal.    CORONARY ARTERY CALCIFICATION: Mild.    UPPER ABDOMEN: Cirrhotic morphology of the liver.    MUSCULOSKELETAL: Unchanged mild compression deformities in the lower thoracic spine.      Impression    IMPRESSION:  Acute bilateral pulmonary emboli with secondary signs of right heart strain.    [Critical Result: New diagnosis of pulmonary embolism]    Finding was identified  on 2023 7:17 PM CST.     Dr. Aranda was contacted by me on 2023 7:27 PM CST and verbalized understanding of the critical result.   CT Head w/o Contrast    Narrative    EXAM: CT HEAD W/O CONTRAST  LOCATION: [facility]  DATE: 2023    INDICATION: New blurry vision in a patient with DVT and bilateral PE, has been on heparin until first dose of apixaban today at noon.  COMPARISON: CT dated 2021.   TECHNIQUE: Routine CT Head without IV contrast. Multiplanar reformats. Dose reduction techniques were used.    FINDINGS:  INTRACRANIAL CONTENTS: No acute intracranial hemorrhage, extra-axial fluid collection, or mass effect. No evidence of an acute transcortical confluent infarct. Grossly similar mild presumed chronic small vessel ischemic changes. Moderate generalized   cerebral parenchymal volume loss. No hydrocephalus.     VISUALIZED ORBITS/SINUSES/MASTOIDS: No acute intraorbital finding. Trace bilateral maxillary sinus mucosal thickening without an air-fluid level. A punctate right anterior ethmoid sinus osteoma. No mastoid or middle ear effusion.    BONES/SOFT TISSUES: No acute abnormality. Torus palatinus.      Impression    IMPRESSION:  1.  No acute intracranial abnormality.   Echocardiogram Complete     Value    LVEF  60-65%    Narrative    [id number]  [id number]  [id number]  [id number]^JOSELUIS^JOSUE^[place] [address]     Name: HIRA JERONIMO  MRN: [id number]  : 1950  Study Date: 2023 11:08 AM  Age: 73 yrs  Gender: Male  Patient Location: [facility]  Reason For Study: Pulmonary Emboli  Ordering Physician: JOSUE HUGGINS  Performed By: CHANEL     BSA: 2.5 m2  Height: 73 in  Weight: 290 lb  HR: 80  BP: 157/74 mmHg  ______________________________________________________________________________  Procedure  Complete Portable Echo Adult. Definity (NDC #51685-572) given intravenously.  Technically difficult study.Extremely poor  acoustic windows. Compared to prior  study, there is no significant change.  ______________________________________________________________________________  Interpretation Summary     Technically difficult study.Extremely poor acoustic windows. Most cardiac  structures are not well visualized.  Left ventricular function is normal.The ejection fraction is 60-65%.  The right ventricle is not well visualized. TAPSE is normal, which is  consistent with normal right ventricular systolic function.  Mild valvular aortic stenosis.  Compared to prior study, there is likely no significant change.     ______________________________________________________________________________  Left Ventricle  The left ventricle is normal in size. Left ventricular function is normal.The  ejection fraction is 60-65%. Left ventricular diastolic function is normal. No  regional wall motion abnormalities noted.     Right Ventricle  The right ventricle is not well visualized. TAPSE is normal, which is  consistent with normal right ventricular systolic function.     Atria  The left atrium is not well visualized. Right atrium not well visualized.     Mitral Valve  The mitral valve is not well visualized. There is no mitral regurgitation  noted. There is no mitral valve stenosis.     Tricuspid Valve  The tricuspid valve is not well visualized. Right ventricular systolic  pressure could not be approximated due to inadequate tricuspid regurgitation.     Aortic Valve  The aortic valve is not well visualized. Mild valvular aortic stenosis.     Pulmonic Valve  The pulmonic valve is not well visualized.     Vessels  IVC diameter <2.1 cm collapsing >50% with sniff suggests a normal RA pressure  of 3 mmHg.     Pericardium  There is no pericardial effusion.     Rhythm  Sinus rhythm was noted.  ______________________________________________________________________________  MMode/2D Measurements & Calculations     TAPSE: 2.5 cm     Time Measurements  MM HR:  67.0 BPM     Doppler Measurements & Calculations  MV E max adolfo: 84.9 cm/sec  MV A max adolfo: 92.0 cm/sec  MV E/A: 0.92  MV dec slope: 339.0 cm/sec2  MV dec time: 0.25 sec  Ao V2 max: 263.0 cm/sec  Ao max P.0 mmHg  Ao V2 mean: 185.0 cm/sec  Ao mean P.0 mmHg  Ao V2 VTI: 62.1 cm  LV V1 max PG: 3.4 mmHg  LV V1 max: 92.7 cm/sec  LV V1 VTI: 22.3 cm  AV Adolfo Ratio (DI): 0.35  E/E' avg: 10.5  Lateral E/e': 10.0  Medial E/e': 11.0  RV S Adolfo: 10.7 cm/sec     ______________________________________________________________________________  Report approved by: Ryan Ruiz 2023 01:56 PM             Discharge Medications   Discharge Medication List as of 2023  9:53 AM        START taking these medications    Details   famotidine (PEPCID) 20 MG tablet Take 1 tablet (20 mg) by mouth 2 times daily for 14 days, Disp-28 tablet, R-0, E-Prescribe      rivaroxaban ANTICOAGULANT (XARELTO) 2.5 MG TABS tablet Take 6 tablets (15 mg) by mouth 2 times daily (with meals) for 19 days, THEN 8 tablets (20 mg) daily (with dinner) for 30 days., Disp-42 tablet, R-0, E-Prescribe           CONTINUE these medications which have NOT CHANGED    Details   acetaminophen (TYLENOL) 325 MG tablet Take 650 mg by mouth every 4 hours as needed for mild pain, Historical      escitalopram oxalate (LEXAPRO) 10 MG tablet [ESCITALOPRAM OXALATE (LEXAPRO) 10 MG TABLET] Take 10 mg by mouth every evening., Historical      Melatonin 10 MG TABS tablet Take 10 mg by mouth at bedtime, Historical      prochlorperazine (COMPAZINE) 10 MG tablet Take 10 mg by mouth every 6 hours as needed for nausea, Historical      traZODone (DESYREL) 50 MG tablet Take 100 mg by mouth at bedtime, Historical           Allergies   No Known Allergies

## 2023-11-28 NOTE — PLAN OF CARE
Problem: VTE (Venous Thromboembolism)  Goal: Tissue Perfusion  Outcome: Progressing     Problem: Gas Exchange Impaired  Goal: Optimal Gas Exchange  Outcome: Progressing  Intervention: Optimize Oxygenation and Ventilation  Recent Flowsheet Documentation  Taken 11/27/2023 2100 by Bruce Louise  Head of Bed ([facility]) Positioning: HOB at 20-30 degrees  Taken 11/27/2023 1700 by Bruce Louise  Head of Bed ([facility]) Positioning: HOB at 20-30 degrees     Pt is to be discharged tomorrow. Home oxygen assessment needs to be performed prior to discharge. O2 stats have been trending between 88 and 95 on RA.

## 2023-11-28 NOTE — PLAN OF CARE
Goal Outcome Evaluation:      Plan of Care Reviewed With: patient, child  Reviewed Discharge Instructions with patient and his daughter, Dwight. Daughter will  prescriptions at [facility]. Patient will follow up with his Primary and his Oncologist this week. All questions answered and patient was discharged to the [facility] via a wheelchair.

## 2023-11-29 ENCOUNTER — PATIENT OUTREACH (OUTPATIENT)
Dept: CARE COORDINATION | Facility: CLINIC | Age: 73
End: 2023-11-29
Payer: COMMERCIAL

## 2023-11-29 NOTE — PROGRESS NOTES
"Clinic Care Coordination Contact  [facility]: Post-Discharge Note  SITUATION                                                      Admission:    Admission Date: 11/21/23   Reason for Admission: Hypoxia  Discharge:   Discharge Date: 11/28/23  Discharge Diagnosis: Hypoxia    BACKGROUND                                                      Mesfin Lee is a 73 year old male admitted on 11/21/2023. He has a pmhx of mdd, insomnia, htn, prior clots, noted w/ RBBB, cirrhotic liver, s/p right colectomy for colon cancer earlier this year in October; who was admitted 11/21/2023 on a heparin drip after being found with Nonocclusive to occlusive DVT extending from the mid right femoral vein into the popliteal and calf veins and also Acute bilateral pulmonary emboli with secondary signs of right heart strain. On admission pt was hypoxic but stable on 2L of 02 via NC, also noted with frequent PVCs but EKG stable. Notably, he was supposed to be on lovenox x28 days after the colectomy, but pt did not ever start the medication as he was 'waiting for pharmacy to call.'     Echo notably w/ TAPSE normal, consistent with normal right ventricular systolic function. With hypoxia, given duonebs for symptoms support. He has documentation of both \"copd\" and \"no copd\" but trial prednisone 40mg clinically helped so weaned down to 1L of NC.      Consulted Hematology/Oncology. They recommended 48-72 more hours on heparin drip upon their consult, before transitioning to Eliquis or Xarelto. Discussed Eliquis vs Xarelto and pt opted for the former given shorter time on a higher dose (1 vs 3 weeks); unfortunately, a rapid response was called on 11/26 afternoon when he experienced a potential medication reaction or hypersensitivity; he had sensation of throat swelling- he was given iv pepcid, IM epinephrine, and iv benadryl and duoneb treatment with resolution of symptoms. After discussion w/ pharmacy, plan re-adjusted for Xarelto 15mg po " BID x3 weeks before a 20mg daily regimen. Moreover, he then developed blurry vision afternoon 11/26 and given his clot hx, a CT Head was obtained which fortunately did not show any acute findings.      He received his first dose of Xarelto 15mg at 10pm on 11/26. He tolerated it well without any throat swelling symptoms or vision changes. On 11/27/2023, we discussed potential discharge w/ close follow up vs monitoring one more night, and he feels most comfortable with ensuring no further issues with a full day of therapy on the Xarelto; as such, will order PT and OT to assess in the meantime and likely discharge tomorrow 11/28 if no further events. Will need heme/onc follow up about when to start capecitabine (4 weeks, plus or minus, delay after hospital discharge).     ----  Bilateral PE and dvt in right femoral; acute respiratory failure w/ hypoxia 2/2 PE (hx of documented copd and also no-copd)  Smoking hx of 25 yrs  Right heart strain, known rbbb on EKG; frequent pvcs  s/p right colectomy for colon cancer in October, discharged w/ lovenox (never /started)   Anemia  Possible medication reaction to apixaban   A- suspect in setting of not taking prophylactic lovenox; have discussed w/ pt but recommend future longitudinal follow through. On 11/26/2023 transitioning from heparin drip to PO; see directly above about a potential medication reaction to apixaban. Now on xarelto.  P:  - xarelto 15mg po bid x3 weeks, then 20mg po every day; further adjustment per oncology at follow up              - follow up w/ oncology on 12/5  - Consult to heme/onc greatly appreciated              - delay capecitabine until they clear (whether 4 weeks or shorter/longer)  - echocardiogram obtained as it has higher sensitivity for right heart strain and follow up on strain              - it showed right ventricle is not well visualized. However, TAPSE is normal, which is consistent with normal right ventricular systolic function  -  prn oxycodone and tylenol  - duonebs QID w/ RT adjusts  - hgb downtrending so monitor for signs/symptoms of bleed while on blood thinner              - transfuse if < 7   - continue prednisone 40mg daily through 11/27     Mdd  Insomnia  A/p- stable, cont lexapro and trazodone, added prn melatonin     Htn  A/p- stable, appropriate for age range; would consider hydralazine if elevated and symptomatic (ie headaches or vision changes)     cirrhotic liver  A/p- noted, follow clinically             ASSESSMENT           Discharge Assessment  How are you doing now that you are home?: Patient shares that he is doing well. Will see PCP Friday and Oncology next week. No questions/concerns or needs at this time  How are your symptoms? (Red Flag symptoms escalate to triage hotline per guidelines): Improved  Do you feel your condition is stable enough to be safe at home until your provider visit?: Yes  Does the patient have their discharge instructions? : Yes  Does the patient have questions regarding their discharge instructions? : No  Were you started on any new medications or were there changes to any of your previous medications? : Yes  Does the patient have all of their medications?: Yes  Do you have questions regarding any of your medications? : No  Do you have all of your needed medical supplies or equipment (DME)?  (i.e. oxygen tank, CPAP, cane, etc.): Yes  Discharge follow-up appointment scheduled within 14 calendar days? : Yes  Discharge Follow Up Appointment Date: 12/01/23  Discharge Follow Up Appointment Scheduled with?: Primary Care Provider    Post-op ([facility] Only)  If the patient had a surgery or procedure, do they have any questions for a nurse?: No    Post-op (Clinicians Only)  Did the patient have surgery or a procedure: No        PLAN                                                      Outpatient Plan:    Follow up with primary care provider, Josr Pruitt, within 3-4 days .  Follow up with your oncologist  as planned.        No future appointments.      For any urgent concerns, please contact our 24 hour nurse triage line: [phone] ([phone])         MEGHAN Daniels

## 2024-01-25 ENCOUNTER — TRANSFERRED RECORDS (OUTPATIENT)
Dept: HEALTH INFORMATION MANAGEMENT | Facility: CLINIC | Age: 74
End: 2024-01-25
Payer: COMMERCIAL

## 2024-01-27 ENCOUNTER — TRANSFERRED RECORDS (OUTPATIENT)
Dept: HEALTH INFORMATION MANAGEMENT | Facility: CLINIC | Age: 74
End: 2024-01-27
Payer: COMMERCIAL

## 2024-01-29 ENCOUNTER — HOSPITAL ENCOUNTER (EMERGENCY)
Facility: CLINIC | Age: 74
Discharge: HOME | End: 2024-01-29
Attending: EMERGENCY MEDICINE | Admitting: EMERGENCY MEDICINE
Payer: COMMERCIAL

## 2024-01-29 VITALS
WEIGHT: 300 LBS | SYSTOLIC BLOOD PRESSURE: 141 MMHG | HEART RATE: 99 BPM | OXYGEN SATURATION: 92 % | BODY MASS INDEX: 38.5 KG/M2 | DIASTOLIC BLOOD PRESSURE: 75 MMHG | TEMPERATURE: 97.6 F | HEIGHT: 74 IN | RESPIRATION RATE: 30 BRPM

## 2024-01-29 DIAGNOSIS — K57.92 DIVERTICULITIS: ICD-10-CM

## 2024-01-29 LAB
ATRIAL RATE - MUSE: 95 BPM
DIASTOLIC BLOOD PRESSURE - MUSE: NORMAL MMHG
HOLD SPECIMEN: NORMAL
INTERPRETATION ECG - MUSE: NORMAL
P AXIS - MUSE: 68 DEGREES
PR INTERVAL - MUSE: 184 MS
QRS DURATION - MUSE: 156 MS
QT - MUSE: 428 MS
QTC - MUSE: 537 MS
R AXIS - MUSE: -21 DEGREES
SYSTOLIC BLOOD PRESSURE - MUSE: NORMAL MMHG
T AXIS - MUSE: 56 DEGREES
VENTRICULAR RATE- MUSE: 95 BPM

## 2024-01-29 PROCEDURE — 99284 EMERGENCY DEPT VISIT MOD MDM: CPT

## 2024-01-29 PROCEDURE — 93005 ELECTROCARDIOGRAM TRACING: CPT | Performed by: EMERGENCY MEDICINE

## 2024-01-29 PROCEDURE — 250N000013 HC RX MED GY IP 250 OP 250 PS 637: Performed by: EMERGENCY MEDICINE

## 2024-01-29 RX ADMIN — AMOXICILLIN AND CLAVULANATE POTASSIUM 1 TABLET: 875; 125 TABLET, FILM COATED ORAL at 14:52

## 2024-01-29 NOTE — DISCHARGE INSTRUCTIONS
Antibiotics take about 24 hours to fully kick in.  That is when the inflammation and swelling slows down.  If you notice that your symptoms are getting worse after 24 hours, or if at any time you have nausea, vomiting or are unable to keep down antibiotics, I would like you to come back to the emergency department.    I recommend Tylenol for discomfort.

## 2024-01-29 NOTE — ED TRIAGE NOTES
Patient reports having CT which showed diverticulitis. Here for antibiotics. Abdominal pain across lower abdomen, worse on left. He is short of breath and has tachypnea in triage. Dizziness when trying to stand. Is on blood thinners, chemo medication.

## 2024-01-29 NOTE — ED PROVIDER NOTES
EMERGENCY DEPARTMENT ENCOUNTER      NAME: Mesfin Lee  AGE: 73 year old male  YOB: 1950  MRN: [id number]  EVALUATION DATE & TIME: No admission date for patient encounter.    PCP: Josr Pruitt    ED PROVIDER: Everardo Monroe M.D.      Chief Complaint   Patient presents with    Abnormal Cxr/ct    Abdominal Pain         FINAL IMPRESSION:  1. Diverticulitis          ED COURSE & MEDICAL DECISION MAKING:    Pertinent Labs & Imaging studies reviewed below.  All EKGs below represent my independent interpretation.   ED Course as of 01/29/24 1516   Mon Jan 29, 2024   1408 Patient is a 73-year-old gentleman with abdominal pain for about a week, left greater than right.  He was seen by outside provider and CT scan 2 days ago was performed.  He was told today that he has diverticulitis and to come to the emergency department for IV antibiotics.  No fever or chills.  Able to eat and drink without nausea or vomiting.  He does not like taking pain medications and has not had anything recently.    I spoke with the radiologist at [facility]: c/w acute diverticulitis. No evidence of abscess formation.    His vital signs are reassuring here, he has normal temperature, heart rate and his blood pressure is 141/75.  He is mildly tachypneic which would contribute to his recent bilateral PE which she is on apixaban for.  I do not think lab work is needed at this time, we have definitive diagnosis with CT scan and he has not considerably worsened since then.    He was given dose of Augmentin here and sent home with an additional 10 days.  I discussed return precautions.         Additional ED Course Timestamps:  1:55 PM Met with and introduced myself to the patient. Discussed history and plan of care.  2:20 PM Rechecked patient and discussed plan for discharge.     Medical Decision Making  Obtained supplemental history:Supplemental history obtained?: No  Reviewed external records: External records reviewed?: No  Care  "impacted by chronic illness:Hypertension and Smoking / Nicotine Use  Care significantly affected by social determinants of health:N/A  Did you consider but not order tests?: Work up considered but not performed and documented in chart, if applicable  Did you interpret images independently?: Independent interpretation of ECG and images noted in documentation, when applicable.  Consultation discussion with other provider:Did you involve another provider (consultant, , pharmacy, etc.)?: No  Discharge. I prescribed additional prescription strength medication(s) as charted. See documentation for any additional details.    At the conclusion of the encounter I discussed the results of all of the tests and the disposition. The questions were answered. The patient or family acknowledged understanding and was agreeable with the care plan.       MEDICATIONS GIVEN IN THE EMERGENCY:  Medications   amoxicillin-clavulanate (AUGMENTIN) 875-125 MG per tablet 1 tablet (1 tablet Oral $Given 1/29/24 1452)         NEW PRESCRIPTIONS STARTED AT TODAY'S ER VISIT  Discharge Medication List as of 1/29/2024  2:42 PM        START taking these medications    Details   amoxicillin-clavulanate (AUGMENTIN) 875-125 MG tablet Take 1 tablet by mouth 2 times daily for 10 days, Disp-20 tablet, R-0, E-Prescribe                =================================================================    HPI    Mesfin Lee is a 73 year old male who presents to this ED for evaluation of abdominal pain.    The patient reports one week of abdominal pain. He states that on Saturday (1/27) he had a CT scan done at [facility] which showed that he has diverticulitis and was instructed to come to the ED to receive IV antibiotics. He has never had diverticulitis before.    Denies fever or any other complaints at this time.    VITALS:  BP (!) 141/75   Pulse 99   Temp 97.6  F (36.4  C) (Temporal)   Resp 30   Ht 1.867 m (6' 1.5\")   Wt 136.1 kg (300 lb)   SpO2 92%   " BMI 39.04 kg/m      PHYSICAL EXAM    Constitutional: Well developed, well nourished. Comfortable appearing. The patient is sitting in a wheelchair.  HENT: Atraumatic, mucous membranes moist, nose normal. Neck- Supple, gross ROM intact.   Eyes: Pupils mid-range, conjunctiva without injection, no discharge.   Respiratory: Clear to auscultation bilaterally no wheezing, speaks full sentences easily. No cough. Mildly dyspneic.  Cardiovascular: Normal heart rate, regular rhythm, no murmurs.   GI: Soft, no masses. Left lower quadrant abdominal pain and, to a lesser extent, right lower quadrant abdominal pain.  Musculoskeletal: Moving all 4 extremities intentionally and without pain. No obvious deformity.  Skin: Warm, dry, no rash.  Neurologic: Alert & oriented x 3, cranial nerves grossly intact.  Psychiatric: Affect normal, cooperative.    PROCEDURES:   None      I, Sylvie Shawn am serving as a scribe to document services personally performed by Dr. Everardo Monroe based on my observation and the provider's statements to me. I, Everardo Monroe MD attest that Sylvie Escobar is acting in a scribe capacity, has observed my performance of the services and has documented them in accordance with my direction.    Everardo Monroe M.D.  Emergency Medicine  [facility] EMERGENCY ROOM  [address] [facility] [id number] [phone]  Dept: [phone]       Everardo Monroe MD  01/29/24 1529

## 2025-02-03 ENCOUNTER — ANESTHESIA EVENT (OUTPATIENT)
Dept: SURGERY | Facility: AMBULATORY SURGERY CENTER | Age: 75
End: 2025-02-03
Payer: COMMERCIAL

## 2025-02-04 ENCOUNTER — ANESTHESIA (OUTPATIENT)
Dept: SURGERY | Facility: AMBULATORY SURGERY CENTER | Age: 75
End: 2025-02-04
Payer: COMMERCIAL

## 2025-02-04 ENCOUNTER — HOSPITAL ENCOUNTER (OUTPATIENT)
Facility: AMBULATORY SURGERY CENTER | Age: 75
Discharge: HOME | End: 2025-02-04
Attending: COLON & RECTAL SURGERY
Payer: COMMERCIAL

## 2025-02-04 VITALS
HEART RATE: 80 BPM | SYSTOLIC BLOOD PRESSURE: 157 MMHG | WEIGHT: 300 LBS | DIASTOLIC BLOOD PRESSURE: 96 MMHG | OXYGEN SATURATION: 94 % | BODY MASS INDEX: 38.5 KG/M2 | RESPIRATION RATE: 20 BRPM | TEMPERATURE: 97.3 F | HEIGHT: 74 IN

## 2025-02-04 LAB — COLONOSCOPY: NORMAL

## 2025-02-04 RX ORDER — NALOXONE HYDROCHLORIDE 0.4 MG/ML
0.1 INJECTION, SOLUTION INTRAMUSCULAR; INTRAVENOUS; SUBCUTANEOUS
Status: DISCONTINUED | OUTPATIENT
Start: 2025-02-04 | End: 2025-02-05 | Stop reason: HOSPADM

## 2025-02-04 RX ORDER — ONDANSETRON 4 MG/1
4 TABLET, ORALLY DISINTEGRATING ORAL EVERY 30 MIN PRN
Status: DISCONTINUED | OUTPATIENT
Start: 2025-02-04 | End: 2025-02-05 | Stop reason: HOSPADM

## 2025-02-04 RX ORDER — LIDOCAINE HYDROCHLORIDE 20 MG/ML
INJECTION, SOLUTION INFILTRATION; PERINEURAL PRN
Status: DISCONTINUED | OUTPATIENT
Start: 2025-02-04 | End: 2025-02-04

## 2025-02-04 RX ORDER — LIDOCAINE 40 MG/G
CREAM TOPICAL
Status: DISCONTINUED | OUTPATIENT
Start: 2025-02-04 | End: 2025-02-05 | Stop reason: HOSPADM

## 2025-02-04 RX ORDER — SODIUM CHLORIDE, SODIUM LACTATE, POTASSIUM CHLORIDE, CALCIUM CHLORIDE 600; 310; 30; 20 MG/100ML; MG/100ML; MG/100ML; MG/100ML
INJECTION, SOLUTION INTRAVENOUS CONTINUOUS
Status: DISCONTINUED | OUTPATIENT
Start: 2025-02-04 | End: 2025-02-05 | Stop reason: HOSPADM

## 2025-02-04 RX ORDER — PROPOFOL 10 MG/ML
INJECTION, EMULSION INTRAVENOUS PRN
Status: DISCONTINUED | OUTPATIENT
Start: 2025-02-04 | End: 2025-02-04

## 2025-02-04 RX ORDER — FENTANYL CITRATE 0.05 MG/ML
25 INJECTION, SOLUTION INTRAMUSCULAR; INTRAVENOUS
Status: DISCONTINUED | OUTPATIENT
Start: 2025-02-04 | End: 2025-02-05 | Stop reason: HOSPADM

## 2025-02-04 RX ORDER — DEXAMETHASONE SODIUM PHOSPHATE 4 MG/ML
4 INJECTION, SOLUTION INTRA-ARTICULAR; INTRALESIONAL; INTRAMUSCULAR; INTRAVENOUS; SOFT TISSUE
Status: DISCONTINUED | OUTPATIENT
Start: 2025-02-04 | End: 2025-02-05 | Stop reason: HOSPADM

## 2025-02-04 RX ORDER — PROPOFOL 10 MG/ML
INJECTION, EMULSION INTRAVENOUS CONTINUOUS PRN
Status: DISCONTINUED | OUTPATIENT
Start: 2025-02-04 | End: 2025-02-04

## 2025-02-04 RX ORDER — ONDANSETRON 2 MG/ML
4 INJECTION INTRAMUSCULAR; INTRAVENOUS EVERY 30 MIN PRN
Status: DISCONTINUED | OUTPATIENT
Start: 2025-02-04 | End: 2025-02-05 | Stop reason: HOSPADM

## 2025-02-04 RX ADMIN — SODIUM CHLORIDE, SODIUM LACTATE, POTASSIUM CHLORIDE, CALCIUM CHLORIDE: 600; 310; 30; 20 INJECTION, SOLUTION INTRAVENOUS at 13:01

## 2025-02-04 RX ADMIN — LIDOCAINE HYDROCHLORIDE 2.5 ML: 20 INJECTION, SOLUTION INFILTRATION; PERINEURAL at 13:57

## 2025-02-04 RX ADMIN — PROPOFOL 100 MG: 10 INJECTION, EMULSION INTRAVENOUS at 13:57

## 2025-02-04 RX ADMIN — PROPOFOL 150 MCG/KG/MIN: 10 INJECTION, EMULSION INTRAVENOUS at 13:57

## 2025-02-04 ASSESSMENT — LIFESTYLE VARIABLES: TOBACCO_USE: 1

## 2025-02-04 NOTE — ANESTHESIA CARE TRANSFER NOTE
Patient: Mesfin Lee    Procedure: Procedure(s):  COLONOSCOPY       Diagnosis: Colon cancer (H) [C18.9]  Diagnosis Additional Information: No value filed.    Anesthesia Type:   MAC     Note:    Oropharynx: oropharynx clear of all foreign objects and spontaneously breathing  Level of Consciousness: awake  Oxygen Supplementation: room air    Independent Airway: airway patency satisfactory and stable  Dentition: dentition unchanged  Vital Signs Stable: post-procedure vital signs reviewed and stable  Report to RN Given: handoff report given  Patient transferred to: Phase II    Handoff Report: Identifed the Patient, Identified the Reponsible Provider, Reviewed the pertinent medical history, Discussed the surgical course, Reviewed Intra-OP anesthesia mangement and issues during anesthesia, Set expectations for post-procedure period and Allowed opportunity for questions and acknowledgement of understanding  Vitals:  Vitals Value Taken Time   /74 02/04/25 1418   Temp 97.3  F (36.3  C) 02/04/25 1415   Pulse 74 02/04/25 1417   Resp 20 02/04/25 1415   SpO2 98 % 02/04/25 1417   Vitals shown include unfiled device data.    Electronically Signed By: SKYLA Morfin CRNA  February 4, 2025  2:21 PM

## 2025-02-04 NOTE — ANESTHESIA PREPROCEDURE EVALUATION
Anesthesia Pre-Procedure Evaluation    Patient: Mesfin Lee   MRN: [id number] : 1950        Procedure : Procedure(s):  COLONOSCOPY          Past Medical History:   Diagnosis Date    Hepatitis C     Hypertension     Obese     Other chronic pain     Thrombosis       Past Surgical History:   Procedure Laterality Date    COLECTOMY PARTIAL      COLONOSCOPY      US GUIDED NEEDLE PLACEMENT  2021    WRIST SURGERY        Allergies   Allergen Reactions    Apixaban      Throat swelling       Social History     Tobacco Use    Smoking status: Former    Smokeless tobacco: Not on file   Substance Use Topics    Alcohol use: Not Currently     Comment: Alcoholic Drinks/day: 2/3 days      Wt Readings from Last 1 Encounters:   25 136.1 kg (300 lb)        Anesthesia Evaluation            ROS/MED HX  ENT/Pulmonary:  - neg pulmonary ROS   (+)                tobacco use, Current use,                       Neurologic:  - neg neurologic ROS     Cardiovascular: Comment: Echo   Technically difficult study.Extremely poor acoustic windows. Most cardiac  structures are not well visualized.  Left ventricular function is normal.The ejection fraction is 60-65%.  The right ventricle is not well visualized. TAPSE is normal, which is  consistent with normal right ventricular systolic function.  Mild valvular aortic stenosis.  Compared to prior study, there is likely no significant change.    - neg cardiovascular ROS   (+)  hypertension- -   -  - -                                      METS/Exercise Tolerance: >4 METS    Hematologic:  - neg hematologic  ROS   (+) History of blood clots,               Musculoskeletal:  - neg musculoskeletal ROS     GI/Hepatic: Comment: Cirhosis of liver improved since patient discontinued etoh  - neg GI/hepatic ROS   (+) GERD, Asymptomatic on medication,         hepatitis type C, liver disease,       Renal/Genitourinary:  - neg Renal ROS     Endo:  - neg endo ROS   (+)               Obesity,    "    Psychiatric/Substance Use:  - neg psychiatric ROS     Infectious Disease:  - neg infectious disease ROS     Malignancy:  - neg malignancy ROS     Other:      (+)  , H/O Chronic Pain,         Physical Exam    Airway  airway exam normal      Mallampati: III       Respiratory Devices and Support         Dental           Cardiovascular   cardiovascular exam normal          Pulmonary   pulmonary exam normal                OUTSIDE LABS:  CBC:   Lab Results   Component Value Date    WBC 8.3 11/27/2023    WBC 9.2 11/24/2023    HGB 7.9 (L) 11/28/2023    HGB 8.0 (L) 11/27/2023    HCT 27.0 (L) 11/27/2023    HCT 28.3 (L) 11/24/2023     11/27/2023     11/24/2023     BMP:   Lab Results   Component Value Date     11/22/2023     11/21/2023    POTASSIUM 4.5 11/22/2023    POTASSIUM 4.5 11/21/2023    CHLORIDE 102 11/22/2023    CHLORIDE 103 11/21/2023    CO2 27 11/22/2023    CO2 27 11/21/2023    BUN 13.7 11/22/2023    BUN 14.3 11/21/2023    CR 0.88 11/28/2023    CR 0.93 11/22/2023     (H) 11/26/2023     (H) 11/22/2023     COAGS:   Lab Results   Component Value Date    PTT 26 11/21/2023    INR 1.17 (H) 11/21/2023    FIBR 533 (H) 01/05/2022     POC: No results found for: \"BGM\", \"HCG\", \"HCGS\"  HEPATIC:   Lab Results   Component Value Date    ALBUMIN 3.2 (L) 01/25/2022    PROTTOTAL 7.4 01/25/2022    ALT 60 (H) 01/25/2022    AST 41 (H) 01/25/2022    ALKPHOS 78 01/25/2022    BILITOTAL 0.6 01/25/2022    DEENA 33 01/15/2022     OTHER:   Lab Results   Component Value Date    LACT 1.1 01/22/2021    A1C 5.8 (H) 01/25/2022    CLIF 8.3 (L) 11/22/2023    PHOS 3.0 01/04/2022    MAG 2.4 01/10/2022    LIPASE 16 12/31/2021    TSH 2.34 01/25/2022    CRP 0.8 (H) 01/13/2022    SED 70 (H) 01/25/2022       Anesthesia Plan    ASA Status:  3    NPO Status:  NPO Appropriate    Anesthesia Type: MAC.              Consents    Anesthesia Plan(s) and associated risks, benefits, and realistic alternatives discussed. " "Questions answered and patient/representative(s) expressed understanding.     - Discussed:     - Discussed with:  Patient            Postoperative Care            Comments:               Janak Haji MD    I have reviewed the pertinent notes and labs in the chart from the past 30 days and (re)examined the patient.  Any updates or changes from those notes are reflected in this note.    Clinically Significant Risk Factors Present on Admission                # Drug Induced Coagulation Defect: home medication list includes an anticoagulant medication    # Hypertension: Noted on problem list           # Obesity: Estimated body mass index is 38.52 kg/m  as calculated from the following:    Height as of this encounter: 1.88 m (6' 2\").    Weight as of this encounter: 136.1 kg (300 lb).                "

## 2025-02-04 NOTE — ANESTHESIA POSTPROCEDURE EVALUATION
Patient: Mesfin Lee    Procedure: Procedure(s):  COLONOSCOPY       Anesthesia Type:  MAC    Note:     Postop Pain Control: Uneventful            Sign Out: Well controlled pain   PONV: No   Neuro/Psych: Uneventful            Sign Out: Acceptable/Baseline neuro status   Airway/Respiratory: Uneventful            Sign Out: Acceptable/Baseline resp. status   CV/Hemodynamics: Uneventful            Sign Out: Acceptable CV status; No obvious hypovolemia; No obvious fluid overload   Other NRE: NONE   DID A NON-ROUTINE EVENT OCCUR? No           Last vitals:  Vitals Value Taken Time   /96 02/04/25 1430   Temp 97.3  F (36.3  C) 02/04/25 1415   Pulse 68 02/04/25 1431   Resp 20 02/04/25 1430   SpO2 93 % 02/04/25 1431   Vitals shown include unfiled device data.    Electronically Signed By: Janak Haji MD  February 4, 2025  3:50 PM

## 2025-02-04 NOTE — DISCHARGE INSTRUCTIONS
If you have any questions or concerns regarding your procedure please contact ADINA Isabel, her office number is [phone].    Discharge Instructions:    Take you medications as directed and follow up with you primary provider as scheduled.   You should expect to pass air from your rectum after the procedure.   Follow these care guidelines during your recovery for the next 24 hours.   If you have any questions or concerns please contact the provider that performed your procedure.     You were given medicine for sedation:  You have been given medicines during your procedure that might make you sleepy and weak. To prevent problems:    *Rest for the rest of the day after you are home. You should be back to your normal activity tomorrow.  *For the next 24 hours:   -Do not drink alcoholic beverages.   -Do not make any important decisions or sign any legal forms.   -Do not work around machinery or power equipment.     The medicines used for sedation may make you feel nauseated.   *Start with clear liquids, such as tea, jell-o, broth and ginger ale. As you feel better you may add soft foods such as pudding and ice cream.   *When you no longer feel nauseated, you may try your normal diet.     You should be back to eating your normal after 24 hours.     Call if you have any of these problems:  *Fever of 101 degree F or 38 degrees C  *Bleeding from the rectum  *Black stool or blood in your bowel movements  *Nausea with vomiting that does not ease after a few hours.  *Abdominal pain or bloating  *Fainting

## 2025-02-04 NOTE — H&P
[facility] Pre-procedure H&P    2/4/2025     Primary Care Physician     Chief Complaint/Reason for Procedure:             surveillance    History and Physical:   Mesfin Lee is a 74 year old male presenting for colonoscopy for surveillance purposes.       Past Medical History   I have reviewed this patient's medical history and updated it with pertinent information if needed.   Past Medical History:   Diagnosis Date    Hepatitis C     Hypertension     Obese     Other chronic pain     Thrombosis        Past Surgical History   I have reviewed this patient's surgical history and updated it with pertinent information if needed.  Past Surgical History:   Procedure Laterality Date    COLECTOMY PARTIAL      COLONOSCOPY      US GUIDED NEEDLE PLACEMENT  01/18/2021    WRIST SURGERY         Allergies:    Allergies   Allergen Reactions    Apixaban      Throat swelling          Prior to Admission Medications   Cannot display prior to admission medications because the patient has not been admitted in this contact.     Allergies   Allergies   Allergen Reactions    Apixaban      Throat swelling        Social History   I have reviewed this patient's social history and updated it with pertinent information if needed. Mesfin Lee  reports that he has quit smoking. He does not have any smokeless tobacco history on file. He reports that he does not currently use alcohol. He reports that he does not use drugs.    Family History   I have reviewed this patient's family history and updated it with pertinent information if needed.   Family History   Problem Relation Age of Onset    No Known Problems Mother     Dementia Father     No Known Problems Sister     No Known Problems Brother     No Known Problems Daughter     No Known Problems Son     No Known Problems Maternal Aunt     No Known Problems Maternal Uncle     No Known Problems Paternal Aunt     No Known Problems Paternal Uncle     No Known Problems Maternal  Grandmother     No Known Problems Maternal Grandfather     No Known Problems Paternal Grandmother     No Known Problems Paternal Grandfather        Review of Systems   The 10 point Review of Systems is negative other than noted in the HPI or here.     Physical Exam   Temp: 98.2  F (36.8  C) Temp src: Temporal BP: (!) 153/80 Pulse: 80   Resp: 18 SpO2: 92 % O2 Device: None (Room air)    Vital Signs with Ranges  Temp:  [98.2  F (36.8  C)] 98.2  F (36.8  C)  Pulse:  [80] 80  Resp:  [18] 18  BP: (153)/(80) 153/80  SpO2:  [92 %] 92 %  300 lbs 0 oz    Aaox3, nad  Lungs clear B  RRR    Data   Assessment/Plan:  Mesfin Lee presents for colonoscopy. Risks and benefits of colonoscopy discussed in detail and informed consent obtained.      - proceed with colonoscopy    Marivel Rich MD, MS  [facility]  Office: [phone]  2/4/2025 1:47 PM